# Patient Record
Sex: MALE | Race: WHITE | NOT HISPANIC OR LATINO | Employment: UNEMPLOYED | URBAN - METROPOLITAN AREA
[De-identification: names, ages, dates, MRNs, and addresses within clinical notes are randomized per-mention and may not be internally consistent; named-entity substitution may affect disease eponyms.]

---

## 2017-12-20 ENCOUNTER — HOSPITAL ENCOUNTER (INPATIENT)
Facility: MEDICAL CENTER | Age: 47
LOS: 5 days | DRG: 482 | End: 2017-12-25
Attending: EMERGENCY MEDICINE | Admitting: FAMILY MEDICINE
Payer: MEDICAID

## 2017-12-20 ENCOUNTER — APPOINTMENT (OUTPATIENT)
Dept: RADIOLOGY | Facility: MEDICAL CENTER | Age: 47
DRG: 482 | End: 2017-12-20
Attending: EMERGENCY MEDICINE
Payer: MEDICAID

## 2017-12-20 DIAGNOSIS — S72.002A CLOSED FRACTURE OF LEFT HIP, INITIAL ENCOUNTER (HCC): Primary | ICD-10-CM

## 2017-12-20 PROBLEM — S72.009A HIP FRACTURE (HCC): Status: ACTIVE | Noted: 2017-12-20

## 2017-12-20 LAB
ALBUMIN SERPL BCP-MCNC: 3.8 G/DL (ref 3.2–4.9)
ALBUMIN/GLOB SERPL: 1.3 G/DL
ALP SERPL-CCNC: 66 U/L (ref 30–99)
ALT SERPL-CCNC: 30 U/L (ref 2–50)
ANION GAP SERPL CALC-SCNC: 10 MMOL/L (ref 0–11.9)
APTT PPP: 30.7 SEC (ref 24.7–36)
AST SERPL-CCNC: 24 U/L (ref 12–45)
BASOPHILS # BLD AUTO: 0.3 % (ref 0–1.8)
BASOPHILS # BLD: 0.03 K/UL (ref 0–0.12)
BILIRUB SERPL-MCNC: 0.5 MG/DL (ref 0.1–1.5)
BUN SERPL-MCNC: 24 MG/DL (ref 8–22)
CALCIUM SERPL-MCNC: 9.2 MG/DL (ref 8.5–10.5)
CHLORIDE SERPL-SCNC: 103 MMOL/L (ref 96–112)
CO2 SERPL-SCNC: 22 MMOL/L (ref 20–33)
CREAT SERPL-MCNC: 0.68 MG/DL (ref 0.5–1.4)
EOSINOPHIL # BLD AUTO: 0.08 K/UL (ref 0–0.51)
EOSINOPHIL NFR BLD: 0.9 % (ref 0–6.9)
ERYTHROCYTE [DISTWIDTH] IN BLOOD BY AUTOMATED COUNT: 39.1 FL (ref 35.9–50)
GFR SERPL CREATININE-BSD FRML MDRD: >60 ML/MIN/1.73 M 2
GLOBULIN SER CALC-MCNC: 2.9 G/DL (ref 1.9–3.5)
GLUCOSE BLD-MCNC: 294 MG/DL (ref 65–99)
GLUCOSE SERPL-MCNC: 352 MG/DL (ref 65–99)
HCT VFR BLD AUTO: 42.3 % (ref 42–52)
HGB BLD-MCNC: 14.1 G/DL (ref 14–18)
IMM GRANULOCYTES # BLD AUTO: 0.03 K/UL (ref 0–0.11)
IMM GRANULOCYTES NFR BLD AUTO: 0.3 % (ref 0–0.9)
INR PPP: 1.09 (ref 0.87–1.13)
LYMPHOCYTES # BLD AUTO: 0.75 K/UL (ref 1–4.8)
LYMPHOCYTES NFR BLD: 8.3 % (ref 22–41)
MAGNESIUM SERPL-MCNC: 2 MG/DL (ref 1.5–2.5)
MCH RBC QN AUTO: 28.1 PG (ref 27–33)
MCHC RBC AUTO-ENTMCNC: 33.3 G/DL (ref 33.7–35.3)
MCV RBC AUTO: 84.4 FL (ref 81.4–97.8)
MONOCYTES # BLD AUTO: 0.57 K/UL (ref 0–0.85)
MONOCYTES NFR BLD AUTO: 6.3 % (ref 0–13.4)
NEUTROPHILS # BLD AUTO: 7.63 K/UL (ref 1.82–7.42)
NEUTROPHILS NFR BLD: 83.9 % (ref 44–72)
NRBC # BLD AUTO: 0 K/UL
NRBC BLD-RTO: 0 /100 WBC
PLATELET # BLD AUTO: 210 K/UL (ref 164–446)
PMV BLD AUTO: 11.1 FL (ref 9–12.9)
POTASSIUM SERPL-SCNC: 3.9 MMOL/L (ref 3.6–5.5)
PROT SERPL-MCNC: 6.7 G/DL (ref 6–8.2)
PROTHROMBIN TIME: 13.8 SEC (ref 12–14.6)
RBC # BLD AUTO: 5.01 M/UL (ref 4.7–6.1)
SODIUM SERPL-SCNC: 135 MMOL/L (ref 135–145)
TROPONIN I SERPL-MCNC: <0.01 NG/ML (ref 0–0.04)
WBC # BLD AUTO: 9.1 K/UL (ref 4.8–10.8)

## 2017-12-20 PROCEDURE — 83970 ASSAY OF PARATHORMONE: CPT

## 2017-12-20 PROCEDURE — 770006 HCHG ROOM/CARE - MED/SURG/GYN SEMI*

## 2017-12-20 PROCEDURE — 83036 HEMOGLOBIN GLYCOSYLATED A1C: CPT

## 2017-12-20 PROCEDURE — 700102 HCHG RX REV CODE 250 W/ 637 OVERRIDE(OP): Performed by: FAMILY MEDICINE

## 2017-12-20 PROCEDURE — 72170 X-RAY EXAM OF PELVIS: CPT

## 2017-12-20 PROCEDURE — 73552 X-RAY EXAM OF FEMUR 2/>: CPT | Mod: LT

## 2017-12-20 PROCEDURE — A9270 NON-COVERED ITEM OR SERVICE: HCPCS | Performed by: FAMILY MEDICINE

## 2017-12-20 PROCEDURE — 83735 ASSAY OF MAGNESIUM: CPT

## 2017-12-20 PROCEDURE — 96372 THER/PROPH/DIAG INJ SC/IM: CPT

## 2017-12-20 PROCEDURE — 84443 ASSAY THYROID STIM HORMONE: CPT

## 2017-12-20 PROCEDURE — 85610 PROTHROMBIN TIME: CPT

## 2017-12-20 PROCEDURE — 85730 THROMBOPLASTIN TIME PARTIAL: CPT

## 2017-12-20 PROCEDURE — 99285 EMERGENCY DEPT VISIT HI MDM: CPT

## 2017-12-20 PROCEDURE — 80053 COMPREHEN METABOLIC PANEL: CPT

## 2017-12-20 PROCEDURE — 96374 THER/PROPH/DIAG INJ IV PUSH: CPT

## 2017-12-20 PROCEDURE — 71010 DX-CHEST-PORTABLE (1 VIEW): CPT

## 2017-12-20 PROCEDURE — 85025 COMPLETE CBC W/AUTO DIFF WBC: CPT

## 2017-12-20 PROCEDURE — 82962 GLUCOSE BLOOD TEST: CPT

## 2017-12-20 PROCEDURE — 84484 ASSAY OF TROPONIN QUANT: CPT

## 2017-12-20 PROCEDURE — 700111 HCHG RX REV CODE 636 W/ 250 OVERRIDE (IP): Performed by: EMERGENCY MEDICINE

## 2017-12-20 PROCEDURE — 94760 N-INVAS EAR/PLS OXIMETRY 1: CPT

## 2017-12-20 RX ORDER — IBUPROFEN 600 MG/1
600 TABLET ORAL EVERY 6 HOURS PRN
Status: DISCONTINUED | OUTPATIENT
Start: 2017-12-20 | End: 2017-12-25 | Stop reason: HOSPADM

## 2017-12-20 RX ORDER — ACETAMINOPHEN 325 MG/1
650 TABLET ORAL EVERY 6 HOURS PRN
Status: DISCONTINUED | OUTPATIENT
Start: 2017-12-20 | End: 2017-12-25 | Stop reason: HOSPADM

## 2017-12-20 RX ORDER — POLYETHYLENE GLYCOL 3350 17 G/17G
1 POWDER, FOR SOLUTION ORAL
Status: DISCONTINUED | OUTPATIENT
Start: 2017-12-20 | End: 2017-12-25 | Stop reason: HOSPADM

## 2017-12-20 RX ORDER — BISACODYL 10 MG
10 SUPPOSITORY, RECTAL RECTAL
Status: DISCONTINUED | OUTPATIENT
Start: 2017-12-20 | End: 2017-12-25 | Stop reason: HOSPADM

## 2017-12-20 RX ORDER — GABAPENTIN 300 MG/1
300 CAPSULE ORAL EVERY EVENING
COMMUNITY

## 2017-12-20 RX ORDER — HYDROMORPHONE HYDROCHLORIDE 2 MG/ML
1 INJECTION, SOLUTION INTRAMUSCULAR; INTRAVENOUS; SUBCUTANEOUS ONCE
Status: COMPLETED | OUTPATIENT
Start: 2017-12-20 | End: 2017-12-20

## 2017-12-20 RX ORDER — HYDROMORPHONE HYDROCHLORIDE 2 MG/ML
1 INJECTION, SOLUTION INTRAMUSCULAR; INTRAVENOUS; SUBCUTANEOUS
Status: DISCONTINUED | OUTPATIENT
Start: 2017-12-20 | End: 2017-12-21

## 2017-12-20 RX ORDER — GABAPENTIN 300 MG/1
300 CAPSULE ORAL EVERY EVENING
Status: DISCONTINUED | OUTPATIENT
Start: 2017-12-20 | End: 2017-12-25 | Stop reason: HOSPADM

## 2017-12-20 RX ORDER — DEXTROSE MONOHYDRATE 25 G/50ML
25 INJECTION, SOLUTION INTRAVENOUS
Status: DISCONTINUED | OUTPATIENT
Start: 2017-12-20 | End: 2017-12-25 | Stop reason: HOSPADM

## 2017-12-20 RX ORDER — INSULIN GLARGINE 100 [IU]/ML
36 INJECTION, SOLUTION SUBCUTANEOUS EVERY EVENING
Status: DISCONTINUED | OUTPATIENT
Start: 2017-12-20 | End: 2017-12-20

## 2017-12-20 RX ORDER — ONDANSETRON 4 MG/1
4 TABLET, ORALLY DISINTEGRATING ORAL EVERY 4 HOURS PRN
Status: DISCONTINUED | OUTPATIENT
Start: 2017-12-20 | End: 2017-12-25 | Stop reason: HOSPADM

## 2017-12-20 RX ORDER — CYCLOBENZAPRINE HCL 10 MG
10 TABLET ORAL EVERY EVENING
COMMUNITY

## 2017-12-20 RX ORDER — INSULIN GLARGINE 100 [IU]/ML
29 INJECTION, SOLUTION SUBCUTANEOUS EVERY EVENING
Status: DISCONTINUED | OUTPATIENT
Start: 2017-12-20 | End: 2017-12-21

## 2017-12-20 RX ORDER — SODIUM CHLORIDE AND POTASSIUM CHLORIDE 150; 450 MG/100ML; MG/100ML
INJECTION, SOLUTION INTRAVENOUS CONTINUOUS
Status: DISCONTINUED | OUTPATIENT
Start: 2017-12-20 | End: 2017-12-22

## 2017-12-20 RX ORDER — ATORVASTATIN CALCIUM 10 MG/1
10 TABLET, FILM COATED ORAL NIGHTLY
Status: DISCONTINUED | OUTPATIENT
Start: 2017-12-20 | End: 2017-12-25 | Stop reason: HOSPADM

## 2017-12-20 RX ORDER — AMOXICILLIN 250 MG
2 CAPSULE ORAL 2 TIMES DAILY
Status: DISCONTINUED | OUTPATIENT
Start: 2017-12-20 | End: 2017-12-25 | Stop reason: HOSPADM

## 2017-12-20 RX ORDER — ATORVASTATIN CALCIUM 10 MG/1
10 TABLET, FILM COATED ORAL NIGHTLY
COMMUNITY

## 2017-12-20 RX ADMIN — STANDARDIZED SENNA CONCENTRATE AND DOCUSATE SODIUM 2 TABLET: 8.6; 5 TABLET, FILM COATED ORAL at 23:29

## 2017-12-20 RX ADMIN — ATORVASTATIN CALCIUM 10 MG: 10 TABLET, FILM COATED ORAL at 23:29

## 2017-12-20 RX ADMIN — GABAPENTIN 300 MG: 300 CAPSULE ORAL at 23:29

## 2017-12-20 RX ADMIN — INSULIN GLARGINE 29 UNITS: 100 INJECTION, SOLUTION SUBCUTANEOUS at 23:40

## 2017-12-20 RX ADMIN — HYDROMORPHONE HYDROCHLORIDE 1 MG: 2 INJECTION INTRAMUSCULAR; INTRAVENOUS; SUBCUTANEOUS at 21:47

## 2017-12-21 LAB
EST. AVERAGE GLUCOSE BLD GHB EST-MCNC: 258 MG/DL
GLUCOSE BLD-MCNC: 156 MG/DL (ref 65–99)
GLUCOSE BLD-MCNC: 160 MG/DL (ref 65–99)
GLUCOSE BLD-MCNC: 181 MG/DL (ref 65–99)
GLUCOSE BLD-MCNC: 234 MG/DL (ref 65–99)
HBA1C MFR BLD: 10.6 % (ref 0–5.6)
PTH-INTACT SERPL-MCNC: 28.2 PG/ML (ref 14–72)
TSH SERPL DL<=0.005 MIU/L-ACNC: 2.42 UIU/ML (ref 0.38–5.33)

## 2017-12-21 PROCEDURE — 700101 HCHG RX REV CODE 250: Performed by: FAMILY MEDICINE

## 2017-12-21 PROCEDURE — A9270 NON-COVERED ITEM OR SERVICE: HCPCS | Performed by: FAMILY MEDICINE

## 2017-12-21 PROCEDURE — 700102 HCHG RX REV CODE 250 W/ 637 OVERRIDE(OP): Performed by: FAMILY MEDICINE

## 2017-12-21 PROCEDURE — 82962 GLUCOSE BLOOD TEST: CPT | Mod: 91

## 2017-12-21 PROCEDURE — 770006 HCHG ROOM/CARE - MED/SURG/GYN SEMI*

## 2017-12-21 PROCEDURE — 700111 HCHG RX REV CODE 636 W/ 250 OVERRIDE (IP): Performed by: FAMILY MEDICINE

## 2017-12-21 RX ORDER — INSULIN GLARGINE 100 [IU]/ML
32 INJECTION, SOLUTION SUBCUTANEOUS EVERY EVENING
Status: DISCONTINUED | OUTPATIENT
Start: 2017-12-21 | End: 2017-12-23

## 2017-12-21 RX ORDER — MORPHINE SULFATE 4 MG/ML
4 INJECTION, SOLUTION INTRAMUSCULAR; INTRAVENOUS
Status: DISCONTINUED | OUTPATIENT
Start: 2017-12-21 | End: 2017-12-25

## 2017-12-21 RX ORDER — HYDROCODONE BITARTRATE AND ACETAMINOPHEN 5; 325 MG/1; MG/1
1-2 TABLET ORAL EVERY 4 HOURS PRN
Status: DISCONTINUED | OUTPATIENT
Start: 2017-12-21 | End: 2017-12-25 | Stop reason: HOSPADM

## 2017-12-21 RX ADMIN — ATORVASTATIN CALCIUM 10 MG: 10 TABLET, FILM COATED ORAL at 20:04

## 2017-12-21 RX ADMIN — HYDROCODONE BITARTRATE AND ACETAMINOPHEN 2 TABLET: 5; 325 TABLET ORAL at 16:49

## 2017-12-21 RX ADMIN — POTASSIUM CHLORIDE AND SODIUM CHLORIDE: 450; 150 INJECTION, SOLUTION INTRAVENOUS at 10:39

## 2017-12-21 RX ADMIN — HYDROMORPHONE HYDROCHLORIDE 1 MG: 2 INJECTION INTRAMUSCULAR; INTRAVENOUS; SUBCUTANEOUS at 01:40

## 2017-12-21 RX ADMIN — INSULIN GLARGINE 32 UNITS: 100 INJECTION, SOLUTION SUBCUTANEOUS at 20:10

## 2017-12-21 RX ADMIN — ENOXAPARIN SODIUM 40 MG: 100 INJECTION SUBCUTANEOUS at 16:50

## 2017-12-21 RX ADMIN — HYDROCODONE BITARTRATE AND ACETAMINOPHEN 2 TABLET: 5; 325 TABLET ORAL at 21:44

## 2017-12-21 RX ADMIN — MORPHINE SULFATE 4 MG: 4 INJECTION INTRAVENOUS at 19:57

## 2017-12-21 RX ADMIN — SERTRALINE 50 MG: 50 TABLET, FILM COATED ORAL at 10:40

## 2017-12-21 RX ADMIN — GABAPENTIN 300 MG: 300 CAPSULE ORAL at 20:04

## 2017-12-21 RX ADMIN — HYDROMORPHONE HYDROCHLORIDE 1 MG: 2 INJECTION INTRAMUSCULAR; INTRAVENOUS; SUBCUTANEOUS at 05:32

## 2017-12-21 RX ADMIN — IBUPROFEN 600 MG: 600 TABLET, FILM COATED ORAL at 02:05

## 2017-12-21 RX ADMIN — MORPHINE SULFATE 4 MG: 4 INJECTION INTRAVENOUS at 10:39

## 2017-12-21 RX ADMIN — POTASSIUM CHLORIDE AND SODIUM CHLORIDE 1 ML: 450; 150 INJECTION, SOLUTION INTRAVENOUS at 02:05

## 2017-12-21 RX ADMIN — POTASSIUM CHLORIDE AND SODIUM CHLORIDE: 450; 150 INJECTION, SOLUTION INTRAVENOUS at 20:03

## 2017-12-21 RX ADMIN — IBUPROFEN 600 MG: 600 TABLET, FILM COATED ORAL at 10:40

## 2017-12-21 ASSESSMENT — LIFESTYLE VARIABLES
TOTAL SCORE: 3
DOES PATIENT WANT TO STOP DRINKING: NO
ALCOHOL_USE: YES
EVER_SMOKED: YES
HEADACHE, FULLNESS IN HEAD: NOT PRESENT
TOTAL SCORE: 3
EVER FELT BAD OR GUILTY ABOUT YOUR DRINKING: YES
EVER HAD A DRINK FIRST THING IN THE MORNING TO STEADY YOUR NERVES TO GET RID OF A HANGOVER: YES
AUDITORY DISTURBANCES: NOT PRESENT
VISUAL DISTURBANCES: NOT PRESENT
TOTAL SCORE: 3
TOTAL SCORE: 0
TREMOR: NO TREMOR
HAVE PEOPLE ANNOYED YOU BY CRITICIZING YOUR DRINKING: NO
ORIENTATION AND CLOUDING OF SENSORIUM: ORIENTED AND CAN DO SERIAL ADDITIONS
CONSUMPTION TOTAL: INCOMPLETE
HOW MANY TIMES IN THE PAST YEAR HAVE YOU HAD 5 OR MORE DRINKS IN A DAY: 20
PAROXYSMAL SWEATS: NO SWEAT VISIBLE
AGITATION: NORMAL ACTIVITY
ANXIETY: NO ANXIETY (AT EASE)
NAUSEA AND VOMITING: NO NAUSEA AND NO VOMITING
HAVE YOU EVER FELT YOU SHOULD CUT DOWN ON YOUR DRINKING: YES

## 2017-12-21 ASSESSMENT — PATIENT HEALTH QUESTIONNAIRE - PHQ9
3. TROUBLE FALLING OR STAYING ASLEEP OR SLEEPING TOO MUCH: NOT AT ALL
7. TROUBLE CONCENTRATING ON THINGS, SUCH AS READING THE NEWSPAPER OR WATCHING TELEVISION: NOT AT ALL
SUM OF ALL RESPONSES TO PHQ9 QUESTIONS 1 AND 2: 3
8. MOVING OR SPEAKING SO SLOWLY THAT OTHER PEOPLE COULD HAVE NOTICED. OR THE OPPOSITE, BEING SO FIGETY OR RESTLESS THAT YOU HAVE BEEN MOVING AROUND A LOT MORE THAN USUAL: NOT AT ALL
5. POOR APPETITE OR OVEREATING: NOT AT ALL
3. TROUBLE FALLING OR STAYING ASLEEP OR SLEEPING TOO MUCH: NOT AT ALL
8. MOVING OR SPEAKING SO SLOWLY THAT OTHER PEOPLE COULD HAVE NOTICED. OR THE OPPOSITE, BEING SO FIGETY OR RESTLESS THAT YOU HAVE BEEN MOVING AROUND A LOT MORE THAN USUAL: NOT AT ALL
7. TROUBLE CONCENTRATING ON THINGS, SUCH AS READING THE NEWSPAPER OR WATCHING TELEVISION: NOT AT ALL
6. FEELING BAD ABOUT YOURSELF - OR THAT YOU ARE A FAILURE OR HAVE LET YOURSELF OR YOUR FAMILY DOWN: NOT AL ALL
6. FEELING BAD ABOUT YOURSELF - OR THAT YOU ARE A FAILURE OR HAVE LET YOURSELF OR YOUR FAMILY DOWN: SEVERAL DAYS
4. FEELING TIRED OR HAVING LITTLE ENERGY: NOT AT ALL
SUM OF ALL RESPONSES TO PHQ9 QUESTIONS 1 AND 2: 0
4. FEELING TIRED OR HAVING LITTLE ENERGY: NOT AT ALL
SUM OF ALL RESPONSES TO PHQ QUESTIONS 1-9: 0
1. LITTLE INTEREST OR PLEASURE IN DOING THINGS: SEVERAL DAYS
1. LITTLE INTEREST OR PLEASURE IN DOING THINGS: NOT AT ALL
5. POOR APPETITE OR OVEREATING: NOT AT ALL
SUM OF ALL RESPONSES TO PHQ QUESTIONS 1-9: 4
2. FEELING DOWN, DEPRESSED, IRRITABLE, OR HOPELESS: NOT AT ALL
2. FEELING DOWN, DEPRESSED, IRRITABLE, OR HOPELESS: MORE THAN HALF THE DAYS

## 2017-12-21 ASSESSMENT — PAIN SCALES - GENERAL
PAINLEVEL_OUTOF10: 3
PAINLEVEL_OUTOF10: 10
PAINLEVEL_OUTOF10: 6
PAINLEVEL_OUTOF10: 10
PAINLEVEL_OUTOF10: 4
PAINLEVEL_OUTOF10: 10
PAINLEVEL_OUTOF10: 4
PAINLEVEL_OUTOF10: 3
PAINLEVEL_OUTOF10: 10

## 2017-12-21 NOTE — DISCHARGE PLANNING
Pt admitted for MGLF and left hip fracture.  Pt may have surgery on Friday or Saturday per Dr. King.  Will wait for recommendations from PT/OT.

## 2017-12-21 NOTE — CARE PLAN
Problem: Safety  Goal: Will remain free from injury    Intervention: Provide assistance with mobility  NWB status , gentle repositioning required for patient comfort

## 2017-12-21 NOTE — PROGRESS NOTES
Assumed care at 0120    Patient screaming in pain . Refusing to transfer from [facility] to bed   Refusing to turn for skin check, was able to assess very carefully after log rolling him to the right.    Provided pain medication   Repositioned and gave blanket and reassuring words to comfort patient     Patient NPO since midnight   Other than a minor sip of water for medication     Patient VSS    Declined feeling any withdraw symptoms from earlier meth use     Patient incoherent for attempt to complete admission profile and refused to cooperate , was very somnolent and did not respond to questions, however AXO 4   Will attempt again before shift end to complete profile

## 2017-12-21 NOTE — ED NOTES
"Pt BIB RFD for GLF at 1700 today w/ possible L sided hip fracture. Pt given total of 300 fentanyl and 5 versed; very drowsy as result when attempting to assess. RPD stated pt has hx of DM and neuropathy w/ FSBG in 100's. Pt oxygen saturation dropped to 78% upon arrival to room and was placed on 5L o2 via NC w/ oxygen improved to 96%. NAD observed at this time.     Chief Complaint   Patient presents with   • GLF     at 1700 today   • Hip Injury     L hip     ./72   Pulse 96   Temp 36.5 °C (97.7 °F)   Resp 14   Ht 1.854 m (6' 1\")   Wt 83.9 kg (185 lb)   SpO2 96%   BMI 24.41 kg/m²     "

## 2017-12-21 NOTE — ED PROVIDER NOTES
"ED Provider Note    CHIEF COMPLAINT  Chief Complaint   Patient presents with   • GLF     at 1700 today   • Hip Injury     L hip       HPI  Colton Prasad Jr. is a 47 y.o. male who presents for evaluation of slip on the ice and ground-level fall onto the left hip. He denies injury to the head and neck or abdomen. He has a history of diabetes. Paramedics picked him up and gave him significant amounts of pain medicine and he was quite somnolent on arrival. There was some shortening and internal rotation on the left lower extremity. No numbness weakness or tingling    REVIEW OF SYSTEMS  See HPI for further details. No high fevers chills night sweats weight loss numbness weakness or tingling All other systems are negative.     PAST MEDICAL HISTORY  Past Medical History:   Diagnosis Date   • Diabetes    • IVDA (Intravenous Drug Abus]        FAMILY HISTORY  No history of bleeding disorder     SOCIAL HISTORY  Social History     Social History   • Marital status: Single     Spouse name: N/A   • Number of children: N/A   • Years of education: N/A     Social History Main Topics   • Smoking status: Former Smoker   • Smokeless tobacco: Current User     Types: Chew      Comment: quit >20 years ago. 14 pack years hx   • Alcohol use No      Comment: occassional   • Drug use:      Types: Marijuana, Methamphetamines      Comment: \"occassional\"   • Sexual activity: Not on file     Other Topics Concern   • Not on file     Social History Narrative   • No narrative on file     Former IV drug use  SURGICAL HISTORY  No past surgical history on file.  Pilonidal cyst  CURRENT MEDICATIONS    Current Facility-Administered Medications:   •  HYDROmorphone (DILAUDID) injection 1 mg, 1 mg, Intravenous, Once, Fede Verdin M.D.    Current Outpatient Prescriptions:   •  methocarbamol (ROBAXIN) 500 MG TABS, Take 1 Tab by mouth 3 times a day as needed., Disp: 45 Tab, Rfl: 0  •  naproxen (NAPROSYN) 500 MG TABS, Take 1 Tab by mouth 2 times " "daily with meals as needed., Disp: 60 Tab, Rfl: 0  •  INSULIN SYRINGE .5CC/29G 29G X 1/2\" 0.5 ML MISC, Use as directed, Disp: 150 Each, Rfl: 0  •  insulin glargine (LANTUS) 100 UNIT/ML SOLN, Inject 20 Units as instructed 2 times a day., Disp: 10 mL, Rfl: 3  •  pregabalin (LYRICA) 100 MG CAPS, 2 caps po tid diabetic neuropathy, Disp: 180 Cap, Rfl: 0  •  insulin regular (HUMULIN R) 100 Unit/mL SOLN, 10 units ac tid breakfast, lunch and dinner. Hold if pre-prandial blood sugar less than 150 mg/dl., Disp: 1 Vial, Rfl: 3  •  sertraline (ZOLOFT) 50 MG TABS, Take 1 Tab by mouth every day., Disp: 30 Tab, Rfl: 3  •  Glucagon, rDNA, 1 MG KIT, 1 Kit by Injection route as needed., Disp: 1 Kit, Rfl: 1  •  glucose blood (PRECISION XTRA TEST STRIPS) strip, Use as directed, Disp: 100 Strip, Rfl: 3  •  losartan (COZAAR) 50 MG TABS, Take 1 Tab by mouth every day., Disp: 30 Tab, Rfl: 3  •  aspirin EC (ECOTRIN) 81 MG TBEC, Take 1 Tab by mouth every day., Disp: 30 Tab, Rfl: 3  •  insulin aspart (NOVOLOG) 100 UNIT/ML SOLN, 10 units ac tid breakfast, lunch and dinner. Hold if pre-prandial blood sugar less than 150 mg/dl., Disp: 2 Vial, Rfl: 3  •  Blood Glucose Monitoring Suppl (PRECISION XTRA MONITOR) BENITO, Test at least 3 times per day., Disp: 1 Each, Rfl: 3  •  TechLite AST Lancets MISC, Use as directed, Disp: 100 Each, Rfl: 3      ALLERGIES  Allergies   Allergen Reactions   • Nkda [No Known Drug Allergy]        PHYSICAL EXAM  VITAL SIGNS: /72   Pulse 96   Temp 36.5 °C (97.7 °F)   Resp 14   Ht 1.854 m (6' 1\")   Wt 83.9 kg (185 lb)   SpO2 96%   BMI 24.41 kg/m²       Constitutional:Somnolent but arousable.   HENT: Normocephalic, Atraumatic, Bilateral external ears normal, Oropharynx moist, No oral exudates, Nose normal.   Eyes: PERRLA, EOMI, Conjunctiva normal, No discharge.   Neck: Normal range of motion, No tenderness, Supple, No stridor.   Cardiovascular: Normal heart rate, Normal rhythm, No murmurs, No rubs, No gallops. "   Thorax & Lungs: Normal breath sounds, No respiratory distress, No wheezing, No chest tenderness.   Abdomen: Bowel sounds normal, Soft, No tenderness, No masses, No pulsatile masses.   Skin: Warm, Dry, No erythema, No rash.   Back: No tenderness, No CVA tenderness.   Extremities: Exquisite pain with any manipulation of the left hip. There is some subtle shortening and internal rotation distal neurovascular exam including sensation on the foot dorsalis pedal pulses normal   Neurologic: Alert & oriented x 3, Normal motor function, Normal sensory function, No focal deficits noted.   Psychiatric: Affect normal, Judgment normal, Mood normal.         Results for orders placed or performed during the hospital encounter of 12/20/17   CBC WITH DIFFERENTIAL   Result Value Ref Range    WBC 9.1 4.8 - 10.8 K/uL    RBC 5.01 4.70 - 6.10 M/uL    Hemoglobin 14.1 14.0 - 18.0 g/dL    Hematocrit 42.3 42.0 - 52.0 %    MCV 84.4 81.4 - 97.8 fL    MCH 28.1 27.0 - 33.0 pg    MCHC 33.3 (L) 33.7 - 35.3 g/dL    RDW 39.1 35.9 - 50.0 fL    Platelet Count 210 164 - 446 K/uL    MPV 11.1 9.0 - 12.9 fL    Neutrophils-Polys 83.90 (H) 44.00 - 72.00 %    Lymphocytes 8.30 (L) 22.00 - 41.00 %    Monocytes 6.30 0.00 - 13.40 %    Eosinophils 0.90 0.00 - 6.90 %    Basophils 0.30 0.00 - 1.80 %    Immature Granulocytes 0.30 0.00 - 0.90 %    Nucleated RBC 0.00 /100 WBC    Neutrophils (Absolute) 7.63 (H) 1.82 - 7.42 K/uL    Lymphs (Absolute) 0.75 (L) 1.00 - 4.80 K/uL    Monos (Absolute) 0.57 0.00 - 0.85 K/uL    Eos (Absolute) 0.08 0.00 - 0.51 K/uL    Baso (Absolute) 0.03 0.00 - 0.12 K/uL    Immature Granulocytes (abs) 0.03 0.00 - 0.11 K/uL    NRBC (Absolute) 0.00 K/uL   COMP METABOLIC PANEL   Result Value Ref Range    Sodium 135 135 - 145 mmol/L    Potassium 3.9 3.6 - 5.5 mmol/L    Chloride 103 96 - 112 mmol/L    Co2 22 20 - 33 mmol/L    Anion Gap 10.0 0.0 - 11.9    Glucose 352 (H) 65 - 99 mg/dL    Bun 24 (H) 8 - 22 mg/dL    Creatinine 0.68 0.50 - 1.40 mg/dL     Calcium 9.2 8.5 - 10.5 mg/dL    AST(SGOT) 24 12 - 45 U/L    ALT(SGPT) 30 2 - 50 U/L    Alkaline Phosphatase 66 30 - 99 U/L    Total Bilirubin 0.5 0.1 - 1.5 mg/dL    Albumin 3.8 3.2 - 4.9 g/dL    Total Protein 6.7 6.0 - 8.2 g/dL    Globulin 2.9 1.9 - 3.5 g/dL    A-G Ratio 1.3 g/dL   TROPONIN   Result Value Ref Range    Troponin I <0.01 0.00 - 0.04 ng/mL   ESTIMATED GFR   Result Value Ref Range    GFR If African American >60 >60 mL/min/1.73 m 2    GFR If Non African American >60 >60 mL/min/1.73 m 2   ACCU-CHEK GLUCOSE   Result Value Ref Range    Glucose - Accu-Ck 294 (H) 65 - 99 mg/dL      RADIOLOGY/PROCEDURES  DX-CHEST-PORTABLE (1 VIEW)   Final Result      Opacity projecting over the right lung apex and right supraclavicular region is likely external to the patient.      DX-PELVIS-1 OR 2 VIEWS   Final Result      Comminuted intertrochanteric fracture of the left femur.      DX-FEMUR-2+ LEFT   Final Result      Intertrochanteric left femur fracture.            COURSE & MEDICAL DECISION MAKING  Pertinent Labs & Imaging studies reviewed. (See chart for details)  Patient initially did not require any pain medication because the paramedics had given him such aggressive doses. Subsequent radiographs demonstrate a left-sided intertrochanteric hip fracture. He is neurovascularly intact. Consultation with the Dr. Yee was obtained. The patient will be admitted to [facility] with orthopedic consultation. He is given IV pain medication here.    FINAL IMPRESSION  1. Acute traumatic left sided intertrochanteric hip fracture  2. Hyperglycemia without acidosis         Electronically signed by: Fede Verdin, 12/20/2017 8:29 PM

## 2017-12-21 NOTE — CARE PLAN
Problem: Communication  Goal: The ability to communicate needs accurately and effectively will improve    Intervention: [place] patient and significant other/support system to call light to alert staff of needs  Patient was not verbally communicating effectively related to intense pain and discomfort. Encouraged the use of the call light and oriented patient to surroundings

## 2017-12-21 NOTE — CARE PLAN
Problem: Nutritional:  Goal: Patient to verbalize or demonstrate understanding of diet  Outcome: MET Date Met: 12/21/17  Provided pt with diabetic diet education. Pt receptive of information.

## 2017-12-21 NOTE — ED NOTES
"Med rec complete per pt at bedside  Allergies reviewed - NKDA  No ABX in last month  Pt has not taken any of his home medications today, states \"only marijuana and meth today\"  "

## 2017-12-21 NOTE — H&P
"Adult History & Physical Exam       HISTORY OF PRESENT ILLNESS:     Chief Complaint: Left hip pain s/p MGLF    History of Present Illness: Colton  is a 47 y.o. Male with a PMH of DM type 1, IVDA (meth  who was admitted on 12/20/2017 for left hip pain after slipping on ice while carrying packages at ~5pm.  First fell and hit his left hip and then left elbow.  Denies hitting his head, denies LOC. Denies elbow pain.  Denies any lightheadedness, dizziness, or LOC before fall.    Also has a history of DM type 1 with non-compliance with DM medication.  Sees Suzette Joe for DM care, but admits he does not use his short acting insulin as instructed.    Active IVDA - methamphetamines      PAST MEDICAL HISTORY:     Primary Care Physician:  Edmar Jolley MD ([facility])    Past Medical History:    1. Diabetes Mellitus type 1,   2. Tobacco dependence (chewing x 25years),   3. Intravenous Drug Abuse (IV Methamphetamines),   4. THC Abuse,   5. Alcoholism,   6. Peripheral Neuropathy,   7. Depression.    Past Surgical History:    1. Cataract surgery,   2. retinal laser surgery,   3. Pilonidal Cyst removal    Allergies:  NKDA    Home Medications:    1. Toujeo 36Units qhs  2. Humalog 1Units for every 100mg/dL greater than 150mg/dL postprandial  3. Atorvastatin 10mg qhs  4. Gabapentin 300mg qhs  5. Sertraline 50mg q day    Social History:    Chewing tobacco x 25 years  \"2 quarts of beer 2-3x/week\"  HX of DUI x 3  THC use (active)  IVDA - Methamphetamines (last used last night)    Family History:  Noncontributory    Review of Systems: I have reviewed at least 10 organs systems and found them to be negative except as described above.     OBJECTIVE:     Vitals:   Blood pressure 120/72, pulse 96, temperature 36.5 °C (97.7 °F), resp. rate 14, height 1.854 m (6' 1\"), weight 83.9 kg (185 lb), SpO2 96 %.    Physical Exam:  Gen:  NAD, disheveled appearance  HEENT: MMM, EOMI, PERRL  Cardio: RRR, clear s1/s2, no murmur  Resp:  Equal " bilat, clear to auscultation  GI/: Soft, non-distended, no TTP, normal bowel sounds, no guarding/rebound  Neuro: Alert and Oriented x3, CN II-XII grossly intact. Sensory in distal BLE diminished (baseline 2/2 neuropathy)  Skin/Extremities: Cap refill <3sec, warm/well perfused, no rash, Left leg externally rotated and too tender to adequately exam. Antecubital fossa injection wounds BUE      Labs:   Lab Results   Component Value Date/Time    SODIUM 135 12/20/2017 08:01 PM    POTASSIUM 3.9 12/20/2017 08:01 PM    CHLORIDE 103 12/20/2017 08:01 PM    CO2 22 12/20/2017 08:01 PM    GLUCOSE 352 (H) 12/20/2017 08:01 PM    BUN 24 (H) 12/20/2017 08:01 PM    CREATININE 0.68 12/20/2017 08:01 PM    CREATININE 0.9 02/25/2009 02:41 PM      Lab Results   Component Value Date/Time    WBC 9.1 12/20/2017 08:45 PM    RBC 5.01 12/20/2017 08:45 PM    HEMOGLOBIN 14.1 12/20/2017 08:45 PM    HEMATOCRIT 42.3 12/20/2017 08:45 PM    MCV 84.4 12/20/2017 08:45 PM    MCH 28.1 12/20/2017 08:45 PM    MCHC 33.3 (L) 12/20/2017 08:45 PM    MPV 11.1 12/20/2017 08:45 PM    NEUTSPOLYS 83.90 (H) 12/20/2017 08:45 PM    LYMPHOCYTES 8.30 (L) 12/20/2017 08:45 PM    MONOCYTES 6.30 12/20/2017 08:45 PM    EOSINOPHILS 0.90 12/20/2017 08:45 PM    BASOPHILS 0.30 12/20/2017 08:45 PM    HYPOCHROMIA 1+ 08/11/2014 09:38 AM        Imaging:   Left Intertrochanteric Fracture    ASSESSMENT/PLAN:   47 y.o. male with Left intertrochanteric femur fracture and DM type 1.    # Left intertrochanteric femur fracture   - Dr. Yee (Ortho) contacted by EDMD Dr. Young and will see in AM   - Ortho rec's (per EDMD) no traction or adjunct measures necessary at this time.  - Dilaudid 1mg q2 hours Prn pain  - APAP and Ibuprofen prn pain.  - SCD VTE PPX - on right only, currently holding LMWH ppx for possible upcoming surgery  - CBC in AM  - NPO at midnight; 1/2NS +20mEq KCl at 125mL/hr maintenance IVF    # Diabetes Mellitus Type 1  - hyperglycemia present  - CMP in AM; Q6 FSBS  -  Continue home Toujeo, as above (needs tonight's dose)  - Insulin Sliding Scale - low intensity  - Hypoglycemia protocol  - A1c pending    # Hx of Alcoholism  - WA protocol, no orders for Ativan at this time.  Will order as needed as pain likely to confound scoring.  -  referral    # Depression  - Continue Sertraline home dosing    # Intravenous Drug Abuse  - Patient last used yesterday  - Wants referral to resources and help quitting  -  Referral    # Full Code    Case discussed with Dr. Diaz at time of admission.

## 2017-12-21 NOTE — PROGRESS NOTES
Surgery planned for today.  Canceled for recent methamphetamine use, a contraindication to anesthesia except for emergencies.    Plan:  - NWB pending surgery  - NPO after midnight Friday for OR on Saturday  - DVT prophylaxis to being immediately with SCD's, Lovenox

## 2017-12-21 NOTE — PROGRESS NOTES
[facility] MED[facility] PROGRESS NOTE     Attending: Priya Diaz MD    Resident: Moraima Velásquez MD & Felix Siegel MD    PATIENT: Colton Prasad; [id number]; 1970    ID: 47 y.o. male admitted for MGLF and left hip fracture, with complicating T1DM, alcoholism and IV drug use.    SUBJECTIVE: No acute events overnight.  Did not receive any ativan for CIWA protocol.  Pain controlled with IV dilaudid.  Very sleepy this morning--received dilaudid 1.5 hours prior to my assessment.  Awakens to verbal commands and A&Ox4.      OBJECTIVE:     Vitals:    12/20/17 2300 12/20/17 2330 12/21/17 0200 12/21/17 0400   BP:   120/75 110/66   Pulse: 96 97 94 85   Resp:   16 16   Temp:   36.4 °C (97.5 °F) 36.1 °C (97 °F)   SpO2: 93% 99% 95% 97%   Weight:       Height:         No intake or output data in the 24 hours ending 12/21/17 0641    PE:  General: No acute distress, afebrile, resting comfortably  HEENT: NC/AT. EOMI. MMM, neck supple without adenopathy or mass  Cardiovascular: RRR, NMGR, cap refill brisk.  Respiratory: CTAB, no tachypnea or retractions  Abdomen: soft, NT/ND, no masses  EXT:  Left lower extremity with external rotation. Exam limited due to pain.  Pulses present in bilateral LE.  Small abrasions noted on bilateral shins.    Neuro: Non-focal    LABS:  Recent Labs      12/20/17 2045   WBC  9.1   RBC  5.01   HEMOGLOBIN  14.1   HEMATOCRIT  42.3   MCV  84.4   MCH  28.1   RDW  39.1   PLATELETCT  210   MPV  11.1   NEUTSPOLYS  83.90*   LYMPHOCYTES  8.30*   MONOCYTES  6.30   EOSINOPHILS  0.90   BASOPHILS  0.30     Recent Labs      12/20/17 2001   SODIUM  135   POTASSIUM  3.9   CHLORIDE  103   CO2  22   BUN  24*   CREATININE  0.68   CALCIUM  9.2   MAGNESIUM  2.0   ALBUMIN  3.8     Estimated GFR/CRCL = Estimated Creatinine Clearance: 151.8 mL/min (by C-G formula based on SCr of 0.68 mg/dL).  Recent Labs      12/20/17 2001 12/20/17 2059   GLUCOSE  352*   --    POCGLUCOSE   --   294*     Recent Labs      12/20/17 2001    ASTSGOT  24   ALTSGPT  30   TBILIRUBIN  0.5   ALKPHOSPHAT  66   GLOBULIN  2.9   INR  1.09     Recent Labs      12/20/17 2001   TROPONINI  <0.01           Invalid input(s): [id number]  Recent Labs      12/20/17 2001   INR  1.09   APTT  30.7         MEDS:  Current Facility-Administered Medications   Medication Last Dose   • senna-docusate (PERICOLACE or SENOKOT S) 8.6-50 MG per tablet 2 Tab 2 Tab at 12/20/17 2329    And   • polyethylene glycol/lytes (MIRALAX) PACKET 1 Packet      And   • magnesium hydroxide (MILK OF MAGNESIA) suspension 30 mL      And   • bisacodyl (DULCOLAX) suppository 10 mg     • ondansetron (ZOFRAN ODT) dispertab 4 mg     • acetaminophen (TYLENOL) tablet 650 mg     • insulin regular (HUMULIN R) injection 1-6 Units      And   • glucose 4 g chewable tablet 16 g      And   • dextrose 50% (D50W) injection 25 mL     • 0.45% NaCl with KCl 20 mEq infusion 1 mL at 12/21/17 0205   • HYDROmorphone (DILAUDID) injection 1 mg 1 mg at 12/21/17 0532   • atorvastatin (LIPITOR) tablet 10 mg 10 mg at 12/20/17 2329   • gabapentin (NEURONTIN) capsule 300 mg 300 mg at 12/20/17 2329   • sertraline (ZOLOFT) tablet 50 mg     • ibuprofen (MOTRIN) tablet 600 mg 600 mg at 12/21/17 0205   • insulin glargine (LANTUS) injection 29 Units 29 Units at 12/20/17 2340       ASSESSMENT/PLAN: 47 y.o. male admitted for MGLF and left hip fracture, with complicating T1DM, alcoholism and IV drug use.     # Left intertrochanteric femur fracture   - OR cancelled today due to recent IV meth use  - Dilaudid changed to morphine/norco as patient very somnolent   - APAP and Ibuprofen prn pain.  - SCD VTE PPX - on right only, currently holding LMWH ppx for possible upcoming surgery  - CBC in AM  - Regular diet ordered  - Needs to be NPO at midnight on Friday for OR on Saturday  - PT/OT post op     # Diabetes Mellitus Type 1  - Hyperglycemia present on admission  - Lantus while inpatient as sub for Toujeo [facility]  formulary--increased to 32 units today  - Insulin Sliding Scale - low intensity  - Hypoglycemia protocol  - A1c pending     # Hx of Alcoholism  - CIWA protocol--no need for ativan at this point  -  referral     # Depression  - Continue Sertraline home dosing     # Intravenous Drug Abuse  - Patient last used 1 day prior to admission  - Wants referral to resources and help quitting  -  Referral     # Full Code

## 2017-12-21 NOTE — CONSULTS
DATE OF SERVICE:  12/20/2017    HISTORY OF PRESENT ILLNESS:  The patient is a 47-year-old gentleman who   slipped on the ice, ground level fall, complains of left hip pain.  Denies any   other injuries.  He does have a past medical history significant for   diabetes.    PAST MEDICAL HISTORY:  Includes insulin-dependent diabetes, history of IV drug   abuse.    MEDICATIONS:  Include Robaxin, insulin, Lantus, Lyrica, Humulin, Zoloft,   Cozaar, aspirin 81 mg.    ALLERGIES:  No known drug allergies.    PHYSICAL EXAMINATION:  VITAL SIGNS:  He is afebrile, vital signs are stable, satting 96% on room air.  EXTREMITIES:  Left lower extremity is slightly shortened and externally   rotated.  I did not do range of motion of left hip as he has a known left hip   intertrochanteric femur fracture.  He has good pedal pulses distally.    Sensation is intact distally to the foot.  No focal deficits appreciated.  GENERAL:  He is alert and oriented, in no apparent distress.    LABORATORY DATA:  Include a white count of 9.1, hematocrit of 42, platelets   210.  He has a glucose of 352, BUN of 24.    RADIOGRAPHS:  AP, pelvis, as well as somewhat of lateral and left femur x-rays   show a left intertrochanteric femur fracture.  The subtrochanter is mildly   displaced.    IMPRESSION:  A 47-year-old gentleman, insulin-dependent diabetic, slipped and   fell on the ice.  He has a left intertrochanteric femur fracture.  He also has   significantly elevated hyperglycemia with a glucose of 352.    PLAN:  He is being admitted to the [facility] Medicine service.  Plan   will be n.p.o. after midnight with surgical fixation tomorrow.       ____________________________________     MD NEVAEH STRAUSS / RYAN    DD:  12/20/2017 22:05:12  DT:  12/20/2017 22:37:40    D#:  [id number]  Job#:  [id number]

## 2017-12-21 NOTE — PROGRESS NOTES
"Two RN skin check complete     No areas of compromise related to pressure   Bruising bi lateral knees     Bruising bi lateral AC UE  Related to IVDA    Scabs bi lateral shins , denies IVDA these area , possible \"weed eater injury\"    Ankles clear , sacrum clear       "

## 2017-12-22 ENCOUNTER — APPOINTMENT (OUTPATIENT)
Dept: RADIOLOGY | Facility: MEDICAL CENTER | Age: 47
DRG: 482 | End: 2017-12-22
Attending: ORTHOPAEDIC SURGERY
Payer: MEDICAID

## 2017-12-22 LAB
ALBUMIN SERPL BCP-MCNC: 2.8 G/DL (ref 3.2–4.9)
ALBUMIN SERPL BCP-MCNC: 3.2 G/DL (ref 3.2–4.9)
ALBUMIN/GLOB SERPL: 1 G/DL
ALBUMIN/GLOB SERPL: 1.2 G/DL
ALP SERPL-CCNC: 50 U/L (ref 30–99)
ALP SERPL-CCNC: 52 U/L (ref 30–99)
ALT SERPL-CCNC: 18 U/L (ref 2–50)
ALT SERPL-CCNC: 21 U/L (ref 2–50)
ANION GAP SERPL CALC-SCNC: 4 MMOL/L (ref 0–11.9)
ANION GAP SERPL CALC-SCNC: 9 MMOL/L (ref 0–11.9)
AST SERPL-CCNC: 16 U/L (ref 12–45)
AST SERPL-CCNC: 16 U/L (ref 12–45)
BILIRUB SERPL-MCNC: 0.3 MG/DL (ref 0.1–1.5)
BILIRUB SERPL-MCNC: 0.4 MG/DL (ref 0.1–1.5)
BUN SERPL-MCNC: 13 MG/DL (ref 8–22)
BUN SERPL-MCNC: 17 MG/DL (ref 8–22)
CALCIUM SERPL-MCNC: 8.1 MG/DL (ref 8.5–10.5)
CALCIUM SERPL-MCNC: 8.4 MG/DL (ref 8.5–10.5)
CHLORIDE SERPL-SCNC: 105 MMOL/L (ref 96–112)
CHLORIDE SERPL-SCNC: 109 MMOL/L (ref 96–112)
CO2 SERPL-SCNC: 23 MMOL/L (ref 20–33)
CO2 SERPL-SCNC: 24 MMOL/L (ref 20–33)
CREAT SERPL-MCNC: 0.53 MG/DL (ref 0.5–1.4)
CREAT SERPL-MCNC: 0.58 MG/DL (ref 0.5–1.4)
ERYTHROCYTE [DISTWIDTH] IN BLOOD BY AUTOMATED COUNT: 38.7 FL (ref 35.9–50)
GFR SERPL CREATININE-BSD FRML MDRD: >60 ML/MIN/1.73 M 2
GFR SERPL CREATININE-BSD FRML MDRD: >60 ML/MIN/1.73 M 2
GLOBULIN SER CALC-MCNC: 2.6 G/DL (ref 1.9–3.5)
GLOBULIN SER CALC-MCNC: 2.7 G/DL (ref 1.9–3.5)
GLUCOSE BLD-MCNC: 105 MG/DL (ref 65–99)
GLUCOSE BLD-MCNC: 124 MG/DL (ref 65–99)
GLUCOSE BLD-MCNC: 59 MG/DL (ref 65–99)
GLUCOSE BLD-MCNC: 67 MG/DL (ref 65–99)
GLUCOSE BLD-MCNC: 67 MG/DL (ref 65–99)
GLUCOSE BLD-MCNC: 74 MG/DL (ref 65–99)
GLUCOSE BLD-MCNC: 95 MG/DL (ref 65–99)
GLUCOSE BLD-MCNC: 99 MG/DL (ref 65–99)
GLUCOSE SERPL-MCNC: 85 MG/DL (ref 65–99)
GLUCOSE SERPL-MCNC: 94 MG/DL (ref 65–99)
HCT VFR BLD AUTO: 35.7 % (ref 42–52)
HGB BLD-MCNC: 11.8 G/DL (ref 14–18)
MCH RBC QN AUTO: 27.4 PG (ref 27–33)
MCHC RBC AUTO-ENTMCNC: 33.1 G/DL (ref 33.7–35.3)
MCV RBC AUTO: 82.8 FL (ref 81.4–97.8)
PLATELET # BLD AUTO: 183 K/UL (ref 164–446)
PMV BLD AUTO: 11.1 FL (ref 9–12.9)
POTASSIUM SERPL-SCNC: 3.9 MMOL/L (ref 3.6–5.5)
POTASSIUM SERPL-SCNC: 4.1 MMOL/L (ref 3.6–5.5)
PROT SERPL-MCNC: 5.5 G/DL (ref 6–8.2)
PROT SERPL-MCNC: 5.8 G/DL (ref 6–8.2)
RBC # BLD AUTO: 4.31 M/UL (ref 4.7–6.1)
SODIUM SERPL-SCNC: 136 MMOL/L (ref 135–145)
SODIUM SERPL-SCNC: 138 MMOL/L (ref 135–145)
WBC # BLD AUTO: 4.4 K/UL (ref 4.8–10.8)

## 2017-12-22 PROCEDURE — 700101 HCHG RX REV CODE 250: Performed by: FAMILY MEDICINE

## 2017-12-22 PROCEDURE — 700111 HCHG RX REV CODE 636 W/ 250 OVERRIDE (IP): Performed by: FAMILY MEDICINE

## 2017-12-22 PROCEDURE — 82962 GLUCOSE BLOOD TEST: CPT

## 2017-12-22 PROCEDURE — A9270 NON-COVERED ITEM OR SERVICE: HCPCS | Performed by: FAMILY MEDICINE

## 2017-12-22 PROCEDURE — 80053 COMPREHEN METABOLIC PANEL: CPT

## 2017-12-22 PROCEDURE — 700102 HCHG RX REV CODE 250 W/ 637 OVERRIDE(OP): Performed by: FAMILY MEDICINE

## 2017-12-22 PROCEDURE — 85027 COMPLETE CBC AUTOMATED: CPT

## 2017-12-22 PROCEDURE — 36415 COLL VENOUS BLD VENIPUNCTURE: CPT

## 2017-12-22 PROCEDURE — 770006 HCHG ROOM/CARE - MED/SURG/GYN SEMI*

## 2017-12-22 RX ORDER — DEXTROSE MONOHYDRATE, SODIUM CHLORIDE, AND POTASSIUM CHLORIDE 50; 1.49; 4.5 G/1000ML; G/1000ML; G/1000ML
INJECTION, SOLUTION INTRAVENOUS CONTINUOUS
Status: DISCONTINUED | OUTPATIENT
Start: 2017-12-22 | End: 2017-12-23 | Stop reason: HOSPADM

## 2017-12-22 RX ADMIN — Medication 16 G: at 17:24

## 2017-12-22 RX ADMIN — HYDROCODONE BITARTRATE AND ACETAMINOPHEN 2 TABLET: 5; 325 TABLET ORAL at 20:12

## 2017-12-22 RX ADMIN — HYDROCODONE BITARTRATE AND ACETAMINOPHEN 2 TABLET: 5; 325 TABLET ORAL at 09:06

## 2017-12-22 RX ADMIN — POTASSIUM CHLORIDE, DEXTROSE MONOHYDRATE AND SODIUM CHLORIDE: 150; 5; 450 INJECTION, SOLUTION INTRAVENOUS at 12:13

## 2017-12-22 RX ADMIN — Medication 16 G: at 06:04

## 2017-12-22 RX ADMIN — POTASSIUM CHLORIDE AND SODIUM CHLORIDE: 450; 150 INJECTION, SOLUTION INTRAVENOUS at 05:54

## 2017-12-22 RX ADMIN — GABAPENTIN 300 MG: 300 CAPSULE ORAL at 20:12

## 2017-12-22 RX ADMIN — HYDROCODONE BITARTRATE AND ACETAMINOPHEN 2 TABLET: 5; 325 TABLET ORAL at 16:05

## 2017-12-22 RX ADMIN — MORPHINE SULFATE 4 MG: 4 INJECTION INTRAVENOUS at 15:50

## 2017-12-22 RX ADMIN — ATORVASTATIN CALCIUM 10 MG: 10 TABLET, FILM COATED ORAL at 20:12

## 2017-12-22 RX ADMIN — HYDROCODONE BITARTRATE AND ACETAMINOPHEN 2 TABLET: 5; 325 TABLET ORAL at 04:27

## 2017-12-22 ASSESSMENT — LIFESTYLE VARIABLES
ORIENTATION AND CLOUDING OF SENSORIUM: ORIENTED AND CAN DO SERIAL ADDITIONS
NAUSEA AND VOMITING: NO NAUSEA AND NO VOMITING
VISUAL DISTURBANCES: NOT PRESENT
NAUSEA AND VOMITING: NO NAUSEA AND NO VOMITING
AGITATION: NORMAL ACTIVITY
ANXIETY: NO ANXIETY (AT EASE)
ANXIETY: NO ANXIETY (AT EASE)
HEADACHE, FULLNESS IN HEAD: NOT PRESENT
TREMOR: NO TREMOR
AUDITORY DISTURBANCES: NOT PRESENT
VISUAL DISTURBANCES: NOT PRESENT
PAROXYSMAL SWEATS: NO SWEAT VISIBLE
HEADACHE, FULLNESS IN HEAD: NOT PRESENT
AGITATION: NORMAL ACTIVITY
NAUSEA AND VOMITING: NO NAUSEA AND NO VOMITING
TOTAL SCORE: 0
PAROXYSMAL SWEATS: NO SWEAT VISIBLE
VISUAL DISTURBANCES: NOT PRESENT
TREMOR: NO TREMOR
VISUAL DISTURBANCES: NOT PRESENT
AUDITORY DISTURBANCES: NOT PRESENT
NAUSEA AND VOMITING: NO NAUSEA AND NO VOMITING
AGITATION: NORMAL ACTIVITY
AGITATION: NORMAL ACTIVITY
TOTAL SCORE: 0
AUDITORY DISTURBANCES: NOT PRESENT
TOTAL SCORE: 0
NAUSEA AND VOMITING: NO NAUSEA AND NO VOMITING
AUDITORY DISTURBANCES: NOT PRESENT
VISUAL DISTURBANCES: NOT PRESENT
TOTAL SCORE: 0
NAUSEA AND VOMITING: NO NAUSEA AND NO VOMITING
PAROXYSMAL SWEATS: NO SWEAT VISIBLE
TREMOR: NO TREMOR
PAROXYSMAL SWEATS: NO SWEAT VISIBLE
AUDITORY DISTURBANCES: NOT PRESENT
ORIENTATION AND CLOUDING OF SENSORIUM: ORIENTED AND CAN DO SERIAL ADDITIONS
TREMOR: NO TREMOR
PAROXYSMAL SWEATS: NO SWEAT VISIBLE
VISUAL DISTURBANCES: NOT PRESENT
ANXIETY: NO ANXIETY (AT EASE)
AUDITORY DISTURBANCES: NOT PRESENT
ANXIETY: NO ANXIETY (AT EASE)
HEADACHE, FULLNESS IN HEAD: NOT PRESENT
TOTAL SCORE: 0
TREMOR: NO TREMOR
PAROXYSMAL SWEATS: NO SWEAT VISIBLE
HEADACHE, FULLNESS IN HEAD: NOT PRESENT
AGITATION: NORMAL ACTIVITY
ANXIETY: NO ANXIETY (AT EASE)
TREMOR: NO TREMOR
ORIENTATION AND CLOUDING OF SENSORIUM: ORIENTED AND CAN DO SERIAL ADDITIONS
ANXIETY: NO ANXIETY (AT EASE)
TOTAL SCORE: 0
ORIENTATION AND CLOUDING OF SENSORIUM: ORIENTED AND CAN DO SERIAL ADDITIONS
HEADACHE, FULLNESS IN HEAD: NOT PRESENT
ORIENTATION AND CLOUDING OF SENSORIUM: ORIENTED AND CAN DO SERIAL ADDITIONS
AGITATION: NORMAL ACTIVITY
ORIENTATION AND CLOUDING OF SENSORIUM: ORIENTED AND CAN DO SERIAL ADDITIONS
HEADACHE, FULLNESS IN HEAD: NOT PRESENT

## 2017-12-22 ASSESSMENT — PAIN SCALES - GENERAL
PAINLEVEL_OUTOF10: 4
PAINLEVEL_OUTOF10: 7
PAINLEVEL_OUTOF10: 10
PAINLEVEL_OUTOF10: 4
PAINLEVEL_OUTOF10: 6
PAINLEVEL_OUTOF10: 10
PAINLEVEL_OUTOF10: 6
PAINLEVEL_OUTOF10: 4

## 2017-12-22 NOTE — PROGRESS NOTES
Report received. Assumed care. Pt in bed awake. A/O x4. VSS. Responds appropriately. C/O pain, medicated per MAR, no SOB.  in use. Assessment complete. Discussed POC, NPO, surgery today, check FSBS, pain control, safety, DC planning, pt verbalizes understanding. Explained importance of calling before getting OOB. Call light and belongings within reach. Pt refuses bed alarm despite education. Bed in the lowest position. Treaded socks in place. Hourly rounding in progress. Will continue to monitor .

## 2017-12-22 NOTE — PROGRESS NOTES
Assumed care of pt 1845. Pt resting in bed.  Reviewed POC including safety, ROM, pain management, medication administration, DVT prophylaxis, and surgery. Pt NPO at 0500. Pt provided snacks and fluids before. Call light within reach. Pt calls appropriately. Hourly rounding in place. Pt has no needs or concerns at this time.

## 2017-12-22 NOTE — PROGRESS NOTES
[facility] FAMILY MEDICINE PROGRESS NOTE     Attending: Priya Diaz MD    Resident: Moraima Velásquez MD & Felix Siegel MD    PATIENT: Colton Prasad; [id number]; 1970    ID: 47 y.o. male admitted for MGLF and left hip fracture, with complicating T1DM, alcoholism and IV drug use.    SUBJECTIVE: Made NPO at 5:00am by ortho--potential surgery today?  No ativan needed for CIWA.  No sliding scale insulin given.  Lantus increased to 32 units yesterday.  Pain controlled with PO norco and IV morphine.     OBJECTIVE:     Vitals:    12/21/17 1600 12/21/17 2053 12/22/17 0055 12/22/17 0351   BP: (!) 96/63 108/72 105/68 107/66   Pulse: 77 85 80 85   Resp: 16 19 16 20   Temp: 36.1 °C (97 °F) 36.2 °C (97.1 °F) 35.8 °C (96.5 °F) 35.8 °C (96.5 °F)   SpO2: 99% 99% 95% 97%   Weight:       Height:           Intake/Output Summary (Last 24 hours) at 12/22/17 0632  Last data filed at 12/21/17 1800   Gross per 24 hour   Intake             2260 ml   Output             1200 ml   Net             1060 ml       PE:  General: No acute distress, afebrile, resting comfortably  HEENT: NC/AT. EOMI. MMM, neck supple without adenopathy or mass  Cardiovascular: RRR, NMGR, cap refill brisk.  Respiratory: CTAB, no tachypnea or retractions  Abdomen: soft, NT/ND, no masses  EXT:  Left lower extremity with external rotation. Exam limited due to pain.  Pulses present in bilateral LE.  Small abrasions noted on bilateral shins.    Neuro: Non-focal    LABS:  Recent Labs      12/20/17 2045 12/22/17   0222   WBC  9.1  4.4*   RBC  5.01  4.31*   HEMOGLOBIN  14.1  11.8*   HEMATOCRIT  42.3  35.7*   MCV  84.4  82.8   MCH  28.1  27.4   RDW  39.1  38.7   PLATELETCT  210  183   MPV  11.1  11.1   NEUTSPOLYS  83.90*   --    LYMPHOCYTES  8.30*   --    MONOCYTES  6.30   --    EOSINOPHILS  0.90   --    BASOPHILS  0.30   --      Recent Labs      12/20/17 2001 12/22/17   0222   SODIUM  135  138   POTASSIUM  3.9  3.9   CHLORIDE  103  105   CO2  22  24   BUN  24*  17    CREATININE  0.68  0.53   CALCIUM  9.2  8.1*   MAGNESIUM  2.0   --    ALBUMIN  3.8  2.8*     Estimated GFR/CRCL = Estimated Creatinine Clearance: 183 mL/min (by C-G formula based on SCr of 0.53 mg/dL).  Recent Labs      12/20/17 2001 12/21/17   1154  12/21/17   1652  12/21/17 2008 12/22/17 0222   GLUCOSE  352*   --    --    --    --   85   POCGLUCOSE   --    < >  156*  160*  181*   --     < > = values in this interval not displayed.     Recent Labs      12/20/17 2001 12/22/17 0222   ASTSGOT  24  16   ALTSGPT  30  21   TBILIRUBIN  0.5  0.4   ALKPHOSPHAT  66  52   GLOBULIN  2.9  2.7   INR  1.09   --      Recent Labs      12/20/17 2001   TROPONINI  <0.01           Invalid input(s): [id number]  Recent Labs      12/20/17 2001   INR  1.09   APTT  30.7       MEDS:  Current Facility-Administered Medications   Medication Last Dose   • morphine (pf) 4 mg/ml injection 4 mg 4 mg at 12/21/17 1957   • hydrocodone-acetaminophen (NORCO) 5-325 MG per tablet 1-2 Tab 2 Tab at 12/22/17 0427   • insulin glargine (LANTUS) injection 32 Units 32 Units at 12/21/17 2010   • enoxaparin (LOVENOX) inj 40 mg 40 mg at 12/21/17 1650   • pneumococcal vaccine (PNEUMOVAX-23) injection 25 mcg     • senna-docusate (PERICOLACE or SENOKOT S) 8.6-50 MG per tablet 2 Tab 2 Tab at 12/20/17 2329    And   • polyethylene glycol/lytes (MIRALAX) PACKET 1 Packet      And   • magnesium hydroxide (MILK OF MAGNESIA) suspension 30 mL      And   • bisacodyl (DULCOLAX) suppository 10 mg     • ondansetron (ZOFRAN ODT) dispertab 4 mg     • acetaminophen (TYLENOL) tablet 650 mg     • insulin regular (HUMULIN R) injection 1-6 Units Stopped at 12/21/17 1100    And   • glucose 4 g chewable tablet 16 g 16 g at 12/22/17 0604    And   • dextrose 50% (D50W) injection 25 mL 25 mL at 12/22/17 0557   • 0.45% NaCl with KCl 20 mEq infusion     • atorvastatin (LIPITOR) tablet 10 mg 10 mg at 12/21/17 2004   • gabapentin (NEURONTIN) capsule 300 mg 300 mg at  12/21/17 2004   • sertraline (ZOLOFT) tablet 50 mg 50 mg at 12/21/17 1040   • ibuprofen (MOTRIN) tablet 600 mg 600 mg at 12/21/17 1040       ASSESSMENT/PLAN: 47 y.o. male admitted for MGLF and left hip fracture, with complicating T1DM, alcoholism and IV drug use.     # Left intertrochanteric femur fracture   - OR today likely--NPO since 5:00am  - Norco/morphine PRN pain  - APAP and Ibuprofen prn pain.  - SCD VTE PPX - on right only, currently holding LMWH ppx for possible upcoming surgery  - CBC in AM  - Regular diet ordered  - Needs to be NPO at midnight on Friday for OR on Saturday  - PT/OT post op     # Diabetes Mellitus Type 1  - Hyperglycemia present on admission  - Lantus while inpatient as sub for Toujeo per hospital formulary--32 units  - Insulin Sliding Scale - low intensity  - Hypoglycemia protocol  - A1c 10.6     # Hx of Alcoholism  - CIWA protocol--no need for ativan at this point  -  referral     # Depression  - Continue Sertraline home dosing     # Intravenous Drug Abuse  - Patient last used 1 day prior to admission  - Wants referral to resources and help quitting  - SW Referral     # Full Code

## 2017-12-22 NOTE — PROGRESS NOTES
Complaining of numbness/tingling to left lower extrem. Toes warm, good cap refill. Jose from ortho at bedside and updated. Attempted to reposition with pillows to help with circulation.

## 2017-12-22 NOTE — CARE PLAN
Problem: Safety  Goal: Will remain free from injury  Outcome: PROGRESSING AS EXPECTED  Treaded socks in place, bed in the lowest position,  call light and belongings within reach, pt call for assistance appropriately    Problem: Pain Management  Goal: Pain level will decrease to patient's comfort goal  Outcome: PROGRESSING AS EXPECTED  Medicated with Norco 5/325 2 tabs per MAR with adequate pain control, hourly rounding in progress    Problem: Skin Integrity  Goal: Risk for impaired skin integrity will decrease  Outcome: PROGRESSING AS EXPECTED   gelacio risk assessment, pt turns self from side to side

## 2017-12-22 NOTE — CARE PLAN
Problem: Safety  Goal: Will remain free from falls  Outcome: PROGRESSING AS EXPECTED  Bed and chair alarm in place. Bed in lowest position. Treaded socks on pt.  Pt calls for help appropriately on call light. Call light with in reach. Hourly rounding in place.        Problem: Bowel/Gastric:  Goal: Normal bowel function is maintained or improved  Pt able to have multiple bowel movements. Bowel sounds normoactive in all four quadrants.

## 2017-12-22 NOTE — PROGRESS NOTES
Hob slightly elevated. Sitting up in bed eating dinner. Pain med given as needed for pain. Vss, no problems noted.

## 2017-12-23 ENCOUNTER — APPOINTMENT (OUTPATIENT)
Dept: RADIOLOGY | Facility: MEDICAL CENTER | Age: 47
DRG: 482 | End: 2017-12-23
Attending: ORTHOPAEDIC SURGERY
Payer: MEDICAID

## 2017-12-23 LAB
ANION GAP SERPL CALC-SCNC: 5 MMOL/L (ref 0–11.9)
APTT PPP: 29.9 SEC (ref 24.7–36)
BASOPHILS # BLD AUTO: 0.8 % (ref 0–1.8)
BASOPHILS # BLD: 0.03 K/UL (ref 0–0.12)
BUN SERPL-MCNC: 8 MG/DL (ref 8–22)
CALCIUM SERPL-MCNC: 8.4 MG/DL (ref 8.5–10.5)
CHLORIDE SERPL-SCNC: 107 MMOL/L (ref 96–112)
CO2 SERPL-SCNC: 23 MMOL/L (ref 20–33)
CREAT SERPL-MCNC: 0.64 MG/DL (ref 0.5–1.4)
EOSINOPHIL # BLD AUTO: 0.2 K/UL (ref 0–0.51)
EOSINOPHIL NFR BLD: 5.6 % (ref 0–6.9)
ERYTHROCYTE [DISTWIDTH] IN BLOOD BY AUTOMATED COUNT: 40.4 FL (ref 35.9–50)
GFR SERPL CREATININE-BSD FRML MDRD: >60 ML/MIN/1.73 M 2
GLUCOSE BLD-MCNC: 175 MG/DL (ref 65–99)
GLUCOSE BLD-MCNC: 175 MG/DL (ref 65–99)
GLUCOSE BLD-MCNC: 181 MG/DL (ref 65–99)
GLUCOSE BLD-MCNC: 198 MG/DL (ref 65–99)
GLUCOSE BLD-MCNC: 264 MG/DL (ref 65–99)
GLUCOSE BLD-MCNC: 301 MG/DL (ref 65–99)
GLUCOSE SERPL-MCNC: 226 MG/DL (ref 65–99)
HCT VFR BLD AUTO: 37.6 % (ref 42–52)
HGB BLD-MCNC: 12 G/DL (ref 14–18)
IMM GRANULOCYTES # BLD AUTO: 0.01 K/UL (ref 0–0.11)
IMM GRANULOCYTES NFR BLD AUTO: 0.3 % (ref 0–0.9)
INR PPP: 1.01 (ref 0.87–1.13)
LYMPHOCYTES # BLD AUTO: 0.89 K/UL (ref 1–4.8)
LYMPHOCYTES NFR BLD: 24.8 % (ref 22–41)
MAGNESIUM SERPL-MCNC: 2 MG/DL (ref 1.5–2.5)
MCH RBC QN AUTO: 27.3 PG (ref 27–33)
MCHC RBC AUTO-ENTMCNC: 31.9 G/DL (ref 33.7–35.3)
MCV RBC AUTO: 85.5 FL (ref 81.4–97.8)
MONOCYTES # BLD AUTO: 0.43 K/UL (ref 0–0.85)
MONOCYTES NFR BLD AUTO: 12 % (ref 0–13.4)
NEUTROPHILS # BLD AUTO: 2.03 K/UL (ref 1.82–7.42)
NEUTROPHILS NFR BLD: 56.5 % (ref 44–72)
NRBC # BLD AUTO: 0 K/UL
NRBC BLD-RTO: 0 /100 WBC
PHOSPHATE SERPL-MCNC: 3.3 MG/DL (ref 2.5–4.5)
PLATELET # BLD AUTO: 173 K/UL (ref 164–446)
PMV BLD AUTO: 11.4 FL (ref 9–12.9)
POTASSIUM SERPL-SCNC: 4.3 MMOL/L (ref 3.6–5.5)
PROTHROMBIN TIME: 13 SEC (ref 12–14.6)
RBC # BLD AUTO: 4.4 M/UL (ref 4.7–6.1)
SODIUM SERPL-SCNC: 135 MMOL/L (ref 135–145)
WBC # BLD AUTO: 3.6 K/UL (ref 4.8–10.8)

## 2017-12-23 PROCEDURE — 85025 COMPLETE CBC W/AUTO DIFF WBC: CPT

## 2017-12-23 PROCEDURE — 160035 HCHG PACU - 1ST 60 MINS PHASE I: Performed by: ORTHOPAEDIC SURGERY

## 2017-12-23 PROCEDURE — 160002 HCHG RECOVERY MINUTES (STAT): Performed by: ORTHOPAEDIC SURGERY

## 2017-12-23 PROCEDURE — 83735 ASSAY OF MAGNESIUM: CPT

## 2017-12-23 PROCEDURE — 501838 HCHG SUTURE GENERAL: Performed by: ORTHOPAEDIC SURGERY

## 2017-12-23 PROCEDURE — 160036 HCHG PACU - EA ADDL 30 MINS PHASE I: Performed by: ORTHOPAEDIC SURGERY

## 2017-12-23 PROCEDURE — 700102 HCHG RX REV CODE 250 W/ 637 OVERRIDE(OP): Performed by: FAMILY MEDICINE

## 2017-12-23 PROCEDURE — C1713 ANCHOR/SCREW BN/BN,TIS/BN: HCPCS | Performed by: ORTHOPAEDIC SURGERY

## 2017-12-23 PROCEDURE — 700111 HCHG RX REV CODE 636 W/ 250 OVERRIDE (IP)

## 2017-12-23 PROCEDURE — 73502 X-RAY EXAM HIP UNI 2-3 VIEWS: CPT | Mod: LT

## 2017-12-23 PROCEDURE — A9270 NON-COVERED ITEM OR SERVICE: HCPCS | Performed by: ORTHOPAEDIC SURGERY

## 2017-12-23 PROCEDURE — 84100 ASSAY OF PHOSPHORUS: CPT

## 2017-12-23 PROCEDURE — 700102 HCHG RX REV CODE 250 W/ 637 OVERRIDE(OP)

## 2017-12-23 PROCEDURE — 110371 HCHG SHELL REV 272: Performed by: ORTHOPAEDIC SURGERY

## 2017-12-23 PROCEDURE — 36415 COLL VENOUS BLD VENIPUNCTURE: CPT

## 2017-12-23 PROCEDURE — 500891 HCHG PACK, ORTHO MAJOR: Performed by: ORTHOPAEDIC SURGERY

## 2017-12-23 PROCEDURE — 80048 BASIC METABOLIC PNL TOTAL CA: CPT

## 2017-12-23 PROCEDURE — 160041 HCHG SURGERY MINUTES - EA ADDL 1 MIN LEVEL 4: Performed by: ORTHOPAEDIC SURGERY

## 2017-12-23 PROCEDURE — 85730 THROMBOPLASTIN TIME PARTIAL: CPT

## 2017-12-23 PROCEDURE — 160009 HCHG ANES TIME/MIN: Performed by: ORTHOPAEDIC SURGERY

## 2017-12-23 PROCEDURE — 700102 HCHG RX REV CODE 250 W/ 637 OVERRIDE(OP): Performed by: ORTHOPAEDIC SURGERY

## 2017-12-23 PROCEDURE — 700111 HCHG RX REV CODE 636 W/ 250 OVERRIDE (IP): Performed by: FAMILY MEDICINE

## 2017-12-23 PROCEDURE — A6402 STERILE GAUZE <= 16 SQ IN: HCPCS | Performed by: ORTHOPAEDIC SURGERY

## 2017-12-23 PROCEDURE — 700105 HCHG RX REV CODE 258: Performed by: FAMILY MEDICINE

## 2017-12-23 PROCEDURE — A9270 NON-COVERED ITEM OR SERVICE: HCPCS | Performed by: FAMILY MEDICINE

## 2017-12-23 PROCEDURE — 160048 HCHG OR STATISTICAL LEVEL 1-5: Performed by: ORTHOPAEDIC SURGERY

## 2017-12-23 PROCEDURE — 82962 GLUCOSE BLOOD TEST: CPT | Mod: 91

## 2017-12-23 PROCEDURE — A9270 NON-COVERED ITEM OR SERVICE: HCPCS

## 2017-12-23 PROCEDURE — 770006 HCHG ROOM/CARE - MED/SURG/GYN SEMI*

## 2017-12-23 PROCEDURE — 160029 HCHG SURGERY MINUTES - 1ST 30 MINS LEVEL 4: Performed by: ORTHOPAEDIC SURGERY

## 2017-12-23 PROCEDURE — 85610 PROTHROMBIN TIME: CPT

## 2017-12-23 PROCEDURE — 700101 HCHG RX REV CODE 250: Performed by: FAMILY MEDICINE

## 2017-12-23 PROCEDURE — 700101 HCHG RX REV CODE 250

## 2017-12-23 DEVICE — SCREW LAG 10.5MM X 100MM (4TX2=8): Type: IMPLANTABLE DEVICE | Status: FUNCTIONAL

## 2017-12-23 DEVICE — SCREW CROSS LOCK 5MM X 50MM (4TX5=20): Type: IMPLANTABLE DEVICE | Status: FUNCTIONAL

## 2017-12-23 DEVICE — NAIL HIP 127.5 DEGREE 11MM X 420MM LT (4TX1=4): Type: IMPLANTABLE DEVICE | Status: FUNCTIONAL

## 2017-12-23 RX ORDER — LABETALOL HYDROCHLORIDE 5 MG/ML
10 INJECTION, SOLUTION INTRAVENOUS
Status: DISCONTINUED | OUTPATIENT
Start: 2017-12-23 | End: 2017-12-23

## 2017-12-23 RX ORDER — SODIUM CHLORIDE 9 MG/ML
INJECTION, SOLUTION INTRAVENOUS CONTINUOUS
Status: DISCONTINUED | OUTPATIENT
Start: 2017-12-23 | End: 2017-12-24

## 2017-12-23 RX ORDER — DIAZEPAM 2 MG/1
TABLET ORAL
Status: COMPLETED
Start: 2017-12-23 | End: 2017-12-23

## 2017-12-23 RX ORDER — INSULIN GLARGINE 100 [IU]/ML
16 INJECTION, SOLUTION SUBCUTANEOUS EVERY EVENING
Status: COMPLETED | OUTPATIENT
Start: 2017-12-23 | End: 2017-12-23

## 2017-12-23 RX ORDER — INSULIN GLARGINE 100 [IU]/ML
16 INJECTION, SOLUTION SUBCUTANEOUS EVERY EVENING
Status: DISCONTINUED | OUTPATIENT
Start: 2017-12-23 | End: 2017-12-23

## 2017-12-23 RX ORDER — OXYCODONE HCL 5 MG/5 ML
SOLUTION, ORAL ORAL
Status: COMPLETED
Start: 2017-12-23 | End: 2017-12-23

## 2017-12-23 RX ORDER — HYDROMORPHONE HYDROCHLORIDE 2 MG/ML
INJECTION, SOLUTION INTRAMUSCULAR; INTRAVENOUS; SUBCUTANEOUS
Status: COMPLETED
Start: 2017-12-23 | End: 2017-12-23

## 2017-12-23 RX ORDER — INSULIN GLARGINE 100 [IU]/ML
26 INJECTION, SOLUTION SUBCUTANEOUS EVERY EVENING
Status: DISCONTINUED | OUTPATIENT
Start: 2017-12-23 | End: 2017-12-23

## 2017-12-23 RX ORDER — LABETALOL HYDROCHLORIDE 5 MG/ML
10 INJECTION, SOLUTION INTRAVENOUS EVERY 4 HOURS PRN
Status: DISCONTINUED | OUTPATIENT
Start: 2017-12-23 | End: 2017-12-25 | Stop reason: HOSPADM

## 2017-12-23 RX ORDER — INSULIN GLARGINE 100 [IU]/ML
26 INJECTION, SOLUTION SUBCUTANEOUS EVERY EVENING
Status: DISCONTINUED | OUTPATIENT
Start: 2017-12-24 | End: 2017-12-25 | Stop reason: HOSPADM

## 2017-12-23 RX ORDER — DIAZEPAM 2 MG/1
2 TABLET ORAL EVERY 6 HOURS PRN
Status: DISCONTINUED | OUTPATIENT
Start: 2017-12-23 | End: 2017-12-25 | Stop reason: HOSPADM

## 2017-12-23 RX ORDER — INSULIN GLARGINE 100 [IU]/ML
10 INJECTION, SOLUTION SUBCUTANEOUS ONCE
Status: COMPLETED | OUTPATIENT
Start: 2017-12-23 | End: 2017-12-23

## 2017-12-23 RX ORDER — MEPERIDINE HYDROCHLORIDE 25 MG/ML
INJECTION INTRAMUSCULAR; INTRAVENOUS; SUBCUTANEOUS
Status: COMPLETED
Start: 2017-12-23 | End: 2017-12-23

## 2017-12-23 RX ADMIN — MEPERIDINE HYDROCHLORIDE 12.5 MG: 25 INJECTION INTRAMUSCULAR; INTRAVENOUS; SUBCUTANEOUS at 11:13

## 2017-12-23 RX ADMIN — INSULIN GLARGINE 16 UNITS: 100 INJECTION, SOLUTION SUBCUTANEOUS at 22:03

## 2017-12-23 RX ADMIN — HYDROMORPHONE HYDROCHLORIDE 0.5 MG: 2 INJECTION INTRAMUSCULAR; INTRAVENOUS; SUBCUTANEOUS at 11:09

## 2017-12-23 RX ADMIN — FENTANYL CITRATE 50 MCG: 50 INJECTION, SOLUTION INTRAMUSCULAR; INTRAVENOUS at 10:40

## 2017-12-23 RX ADMIN — GABAPENTIN 300 MG: 300 CAPSULE ORAL at 20:30

## 2017-12-23 RX ADMIN — INSULIN HUMAN 4 UNITS: 100 INJECTION, SOLUTION PARENTERAL at 20:20

## 2017-12-23 RX ADMIN — HYDROMORPHONE HYDROCHLORIDE 0.5 MG: 2 INJECTION INTRAMUSCULAR; INTRAVENOUS; SUBCUTANEOUS at 11:00

## 2017-12-23 RX ADMIN — MORPHINE SULFATE 4 MG: 4 INJECTION INTRAVENOUS at 04:23

## 2017-12-23 RX ADMIN — HYDROMORPHONE HYDROCHLORIDE 0.5 MG: 2 INJECTION INTRAMUSCULAR; INTRAVENOUS; SUBCUTANEOUS at 11:35

## 2017-12-23 RX ADMIN — POTASSIUM CHLORIDE, DEXTROSE MONOHYDRATE AND SODIUM CHLORIDE: 150; 5; 450 INJECTION, SOLUTION INTRAVENOUS at 00:06

## 2017-12-23 RX ADMIN — INSULIN HUMAN 3 UNITS: 100 INJECTION, SOLUTION PARENTERAL at 16:32

## 2017-12-23 RX ADMIN — MORPHINE SULFATE 4 MG: 4 INJECTION INTRAVENOUS at 20:26

## 2017-12-23 RX ADMIN — SODIUM CHLORIDE: 9 INJECTION, SOLUTION INTRAVENOUS at 22:02

## 2017-12-23 RX ADMIN — HYDROMORPHONE HYDROCHLORIDE 0.5 MG: 2 INJECTION INTRAMUSCULAR; INTRAVENOUS; SUBCUTANEOUS at 10:50

## 2017-12-23 RX ADMIN — DIAZEPAM 2 MG: 2 TABLET ORAL at 14:17

## 2017-12-23 RX ADMIN — HYDROCODONE BITARTRATE AND ACETAMINOPHEN 2 TABLET: 5; 325 TABLET ORAL at 16:40

## 2017-12-23 RX ADMIN — MORPHINE SULFATE 4 MG: 4 INJECTION INTRAVENOUS at 17:05

## 2017-12-23 RX ADMIN — OXYCODONE HYDROCHLORIDE 10 MG: 5 SOLUTION ORAL at 10:45

## 2017-12-23 RX ADMIN — INSULIN GLARGINE 10 UNITS: 100 INJECTION, SOLUTION SUBCUTANEOUS at 15:19

## 2017-12-23 RX ADMIN — FENTANYL CITRATE 50 MCG: 50 INJECTION, SOLUTION INTRAMUSCULAR; INTRAVENOUS at 10:45

## 2017-12-23 RX ADMIN — ATORVASTATIN CALCIUM 10 MG: 10 TABLET, FILM COATED ORAL at 20:29

## 2017-12-23 RX ADMIN — HYDROCODONE BITARTRATE AND ACETAMINOPHEN 2 TABLET: 5; 325 TABLET ORAL at 22:01

## 2017-12-23 RX ADMIN — HYDROCODONE BITARTRATE AND ACETAMINOPHEN 2 TABLET: 5; 325 TABLET ORAL at 00:05

## 2017-12-23 RX ADMIN — HYDROCODONE BITARTRATE AND ACETAMINOPHEN 2 TABLET: 5; 325 TABLET ORAL at 05:48

## 2017-12-23 ASSESSMENT — PAIN SCALES - GENERAL
PAINLEVEL_OUTOF10: 6
PAINLEVEL_OUTOF10: 8
PAINLEVEL_OUTOF10: 3
PAINLEVEL_OUTOF10: 7
PAINLEVEL_OUTOF10: 10
PAINLEVEL_OUTOF10: 4
PAINLEVEL_OUTOF10: 4
PAINLEVEL_OUTOF10: 6
PAINLEVEL_OUTOF10: 5
PAINLEVEL_OUTOF10: 6
PAINLEVEL_OUTOF10: 4
PAINLEVEL_OUTOF10: 5
PAINLEVEL_OUTOF10: 4
PAINLEVEL_OUTOF10: 8
PAINLEVEL_OUTOF10: 10
PAINLEVEL_OUTOF10: 5
PAINLEVEL_OUTOF10: 3
PAINLEVEL_OUTOF10: 0
PAINLEVEL_OUTOF10: 7

## 2017-12-23 ASSESSMENT — LIFESTYLE VARIABLES
ORIENTATION AND CLOUDING OF SENSORIUM: ORIENTED AND CAN DO SERIAL ADDITIONS
HEADACHE, FULLNESS IN HEAD: NOT PRESENT
AGITATION: NORMAL ACTIVITY
NAUSEA AND VOMITING: NO NAUSEA AND NO VOMITING
PAROXYSMAL SWEATS: NO SWEAT VISIBLE
ANXIETY: NO ANXIETY (AT EASE)
VISUAL DISTURBANCES: NOT PRESENT
TOTAL SCORE: 0
AUDITORY DISTURBANCES: NOT PRESENT
TREMOR: NO TREMOR

## 2017-12-23 NOTE — PROGRESS NOTES
[facility] MEDICINE PROGRESS NOTE     Attending: Edmar Kidd MD    Resident: Moraima Velásquez MD & Felix Siegel MD    PATIENT: Colton Prasad; [id number]; 1970    ID: 47 y.o. male admitted for MGLF and left hip fracture, with complicating T1DM, alcoholism and IV drug use.    SUBJECTIVE: Patient reports sleeping well, pain under control and BM yesterday. He denies HA, dizziness, CP, SOB, ABD pain, N/V, or fever/chills.     NPO since midnight for anticipated surgery today. Surgery canceled yesterday given no available anesthesiologist due emergency case. Lovenox held (last dose 12/21). Lantus held last night due to low blood glucose 61 x2 in the evening with -181 this AM.    OBJECTIVE:     Vitals:    12/22/17 1530 12/22/17 1749 12/22/17 2106 12/23/17 0441   BP: 124/76 125/72 137/80 (!) 95/61   Pulse: 79 89 88 61   Resp: 18 16 19 19   Temp: 36.3 °C (97.4 °F) 37.4 °C (99.3 °F) 36.7 °C (98.1 °F) 36.4 °C (97.5 °F)   SpO2: 98% 100% 100% 94%   Weight:       Height:             Intake/Output Summary (Last 24 hours) at 12/23/17 0647  Last data filed at 12/23/17 0442   Gross per 24 hour   Intake             1275 ml   Output             3350 ml   Net            -2075 ml         PE:  General: No acute distress, afebrile, resting comfortably  HEENT: NC/AT. EOMI. MMM, neck supple without adenopathy or mass  Cardiovascular: RRR, NMGR, cap refill brisk.  Respiratory: CTAB, no tachypnea or retractions  Abdomen: soft, NT/ND, no masses  EXT:  Left lower extremity with external rotation. Exam limited due to pain.  Pulses present in bilateral LE.  Small abrasions noted on bilateral shins.    Neuro: Non-focal    LABS:  Recent Labs      12/20/17   2045  12/22/17   0222  12/23/17   0620   WBC  9.1  4.4*  3.6*   RBC  5.01  4.31*  4.40*   HEMOGLOBIN  14.1  11.8*  12.0*   HEMATOCRIT  42.3  35.7*  37.6*   MCV  84.4  82.8  85.5   MCH  28.1  27.4  27.3   RDW  39.1  38.7  40.4   PLATELETCT  210  183  173   MPV  11.1  11.1  11.4    NEUTSPOLYS  83.90*   --   56.50   LYMPHOCYTES  8.30*   --   24.80   MONOCYTES  6.30   --   12.00   EOSINOPHILS  0.90   --   5.60   BASOPHILS  0.30   --   0.80     Recent Labs      12/20/17 2001 12/22/17 0222 12/22/17   1443   SODIUM  135  138  136   POTASSIUM  3.9  3.9  4.1   CHLORIDE  103  105  109   CO2  22  24  23   BUN  24*  17  13   CREATININE  0.68  0.53  0.58   CALCIUM  9.2  8.1*  8.4*   MAGNESIUM  2.0   --    --    ALBUMIN  3.8  2.8*  3.2     Estimated GFR/CRCL = Estimated Creatinine Clearance: 167.2 mL/min (by C-G formula based on SCr of 0.58 mg/dL).  Recent Labs      12/20/17 2001 12/22/17 0222 12/22/17 1443   12/22/17   2014  12/23/17   0004  12/23/17   0546   GLUCOSE  352*   --   85   --   94   --    --    --    --    POCGLUCOSE   --    < >   --    < >   --    < >  124*  175*  181*    < > = values in this interval not displayed.     Recent Labs      12/20/17 2001 12/22/17 0222 12/22/17   1443   ASTSGOT  24  16  16   ALTSGPT  30  21  18   TBILIRUBIN  0.5  0.4  0.3   ALKPHOSPHAT  66  52  50   GLOBULIN  2.9  2.7  2.6   INR  1.09   --    --      Recent Labs      12/20/17 2001   TROPONINI  <0.01           Invalid input(s): [id number]  Recent Labs      12/20/17 2001   INR  1.09   APTT  30.7       MEDS:  Current Facility-Administered Medications   Medication Last Dose   • dextrose 5 % and 0.45 % NaCl with KCl 20 mEq     • morphine (pf) 4 mg/ml injection 4 mg 4 mg at 12/23/17 0423   • hydrocodone-acetaminophen (NORCO) 5-325 MG per tablet 1-2 Tab 2 Tab at 12/23/17 0548   • insulin glargine (LANTUS) injection 32 Units Stopped at 12/22/17 2100   • enoxaparin (LOVENOX) inj 40 mg Stopped at 12/22/17 0900   • pneumococcal vaccine (PNEUMOVAX-23) injection 25 mcg     • senna-docusate (PERICOLACE or SENOKOT S) 8.6-50 MG per tablet 2 Tab 2 Tab at 12/20/17 4126    And   • polyethylene glycol/lytes (MIRALAX) PACKET 1 Packet      And   • magnesium hydroxide (MILK OF MAGNESIA) suspension 30 mL       And   • bisacodyl (DULCOLAX) suppository 10 mg     • ondansetron (ZOFRAN ODT) dispertab 4 mg     • acetaminophen (TYLENOL) tablet 650 mg     • insulin regular (HUMULIN R) injection 1-6 Units Stopped at 12/21/17 1100    And   • glucose 4 g chewable tablet 16 g 16 g at 12/22/17 1724    And   • dextrose 50% (D50W) injection 25 mL 25 mL at 12/22/17 0557   • atorvastatin (LIPITOR) tablet 10 mg 10 mg at 12/22/17 2012   • gabapentin (NEURONTIN) capsule 300 mg 300 mg at 12/22/17 2012   • sertraline (ZOLOFT) tablet 50 mg 50 mg at 12/21/17 1040   • ibuprofen (MOTRIN) tablet 600 mg 600 mg at 12/21/17 1040       ASSESSMENT/PLAN: 47 y.o. male admitted for MGLF and left hip fracture, with complicating T1DM, alcoholism and IV drug use.     # Left intertrochanteric femur fracture   - OR today likely-NPO since MN  - Norco/morphine PRN pain  - APAP and Ibuprofen prn pain.  - SCD VTE PPX - holding anticoagulation for surgery  - Regular diet ordered  -Currently NPO  - PT/OT post op     # Diabetes Mellitus Type 1  #Hypoglycemia, BG 61 x2 during the day while NPO despite D5 IVMF  - Hyperglycemia present on admission  - Currently on D5 IVMF while NPO, will consider switching after surgery  - Lantus held last night, will restart Lantus at 26 units after surgery(home Toujeo 32 unit equivalent)  - Insulin Sliding Scale - low intensity  - Hypoglycemia protocol  - A1c 10.6     # Hx of Alcoholism  - CIWA protocol--no need for ativan at this point  -  referral     # Depression  - Continue Sertraline home dosing     # Intravenous Drug Abuse  - Patient last used 1 day prior to admission  - Wants referral to resources and help quitting  -  Referral     # Full Code

## 2017-12-23 NOTE — PROGRESS NOTES
Diabetes education: Met with patient briefly as he was in pain and had just received pain medication. Please see consult note.  CDE will follow up on Tuesday if patient remains in the hospital. Please call [phone] if needs change.

## 2017-12-23 NOTE — CARE PLAN
Problem: Safety  Goal: Will remain free from injury  Outcome: PROGRESSING AS EXPECTED  Provided assistance with mobility. Fall prevention measures in place. rounds ongoing.    Problem: Pain Management  Goal: Pain level will decrease to patient's comfort goal  Outcome: PROGRESSING AS EXPECTED  Pain medications given per MAR. Other non-pharmacologic measures for pain utilized.

## 2017-12-23 NOTE — PROGRESS NOTES
Pt came back to the floor after surgery has cancelled. Blood sugar rechecked: 99. Dinner tray served, will be NPO after midnight tonight for surgery tomorrow.

## 2017-12-23 NOTE — CONSULTS
Diabetes education: Met with patient this afternoon to discuss diabetes management. Pt has a hx of type one diabetes for 40 years. Pt states he takes Toujeo now as insurance would  Not cover Lantus ( it now will cover Basaglar). He takes a fast acting insulin per sliding scale. Pt states he uses his stomach for injections, discussed site rotation. Pt states he has a meter, strips, insulin, pen needles and syringes (fast acting only in vials). No questions at this time. Pt to undergo surgery on his hip tonight. CDE will follow up on Tuesday if patient remains in hospital. Please call [phone] if needs change.

## 2017-12-23 NOTE — OR NURSING
----- Message from Mackenzie Gallegos MD sent at 10/15/2017  9:04 PM CDT -----  Please advise negative urine culture results.  OK to discontinue macrobid.   Call placed to Dr. King; received orders for valium po for pt's LLE muscle spasms.

## 2017-12-23 NOTE — PROGRESS NOTES
S: 46 yo M s/p left intertrochanteric femur fractures  Patient is alert and answering question appropriately. He denies lower leg pain, nausea, HA, dizziness, abdominal pain.  O: Afebrile, /85, HR 96, RR 12, SpO2 97% in room-air  PE: A&Ox4, lungs CTAB, Cardiac RRR, no murmurs, warm and well purfused, distal LE warm, well perfused, pulse intact, surgical site with dressing c/d/i    A/P  -encouraged liquid diet and soft food and advance diet as tolerated  -added Labetalol IV 10mg  PRN for SBP >180, DBP >110  -ordered ACCU FSBG checks now, then Q4 hours until AM  -ordered Lantus 10 units now  -continue ISS, ACHS FSBG checks, hypoglycemic protocol  -changed Lantus QHS dose tonight to 26-->16 units  -repeat CBC and BMP in AM  -d/c IVMF once patient is tolerated PO intake

## 2017-12-23 NOTE — OR NURSING
Pt tolerating orals; moaning in pain; attempting to reposition to position of comfort. Denies anxiety.

## 2017-12-23 NOTE — PROGRESS NOTES
Rechecked FSBS per pt request at 1720.FSBS = 67. Glucose tabs 4 given per MD order. Notified preop RN. Pt left for surgery at 1728.

## 2017-12-23 NOTE — PROGRESS NOTES
Pt has returned to floor from PACU w/out event. RN and CNA have completed initial assessments, post op VS started. Pt has SCD's on. Pt is calm, and sleeping. Hourly rounds ongoing, call light w/in reach.

## 2017-12-23 NOTE — PROGRESS NOTES
"Seen and examined, NPO for surgery.    Blood pressure 125/72, pulse 89, temperature 37.4 °C (99.3 °F), resp. rate 16, height 1.854 m (6' 1\"), weight 75.1 kg (165 lb 9.1 oz), SpO2 100 %.      Exam:  Pain with movement of left hip and extremity    #1 Left subtrochanteric femur fracture    - To OR today for IM nail  - NPO  - WBAT post-op  "

## 2017-12-23 NOTE — PROGRESS NOTES
"Pt resting in bed, sleeping intermittently, calm, cooperative, flat affect - currently states pain managed on PRN med. RN has given report to pre-op RN. Pt has SCDs at bedside but has refused at this time, will do EDU after surgery - Lovenox ordered for VTE. RN and CNA have collaborated to ensure pt is repositioned frequently for skin integrity. Fall and safety precautions in place, pt uses call light appropriately. RN will reinforce CDB w/ \"I.S.\" t/o day. Hourly rounds ongoing, call light w/in reach.   "

## 2017-12-23 NOTE — PROGRESS NOTES
RN present for rounds at bedside by DR Siegel. POC discussed. Dr aware of elevated BP currently, has stated he feels it will self resolve / and or RN to use PRN med if SBP remains elevated.

## 2017-12-23 NOTE — OP REPORT
DATE OF OPERATION: 12/23/2017     PREOPERATIVE DIAGNOSIS: left intertrochanteric femur fracture    POSTOPERATIVE DIAGNOSIS: Same    PROCEDURE PERFORMED: Surgical treatment of left intertrochanteric femur fracture with intramedullary device    SURGEON: Brody King M.D.     ASSISTANT:  Jose Syed PA-C    ANESTHESIOLOGIST: Ariel Guajardo MD.    ANESTHESIA: General    ASA CLASSIFICATION: II    INDICATIONS: The patient is a 47 y.o. male with a left intertrochanteric femur fracture resulting from a ground level fall.  The patient denies antecedent pain, and was found to have a normal neurovascular exam and skin envelope.  Radiographs demonstrated the intertrochanteric femur fracture.  Given these findings, the patient is an appropriately indicated candidate for surgical treatment of the intertrochanteric fracture with an intramedullary device.  I discussed the risks and benefits of the procedure, including the risks of infection, wound healing complication, neurovascular injury, persistent hip pain, malunion, non-union, malrotation, and the medical risks of anesthesia including MI, stroke, and death.  Benefits include early mobilization, improved chance of union, and reduction in the medical risks of hip fractures.  Alternatives to surgery were also discussed, including non-operative management, which I did not recommend.  The patient was in agreement with the plan to proceed, and the informed consent was signed and documented.  I met with the patient pre-operatively and marked the operative extremity with their agreement.  We proceeded to the operating room.     WOUND CLASSIFICATION: Class I    SPECIMEN: None    ESTIMATED BLOOD LOSS: 25 mL    PROCEDURE:  The patient underwent anesthesia, and was positioned supine on the fracture table with all bony prominences were well padded.  Sequential compression devices were employed.  Preoperative imaging was obtained, demonstrated anatomic reduction of the fracture using  the table. The correct operative site was prepped and draped into a sterile field, and the surgical team scrubbed in.  A procedural pause was conducted to verify correct patient, correct extremity, presence of the surgeons initials on the operative extremity, and administration of IV antibiotics, in this case, Ancef.    The starting guide pin for the OIC hip nail was advanced down to a start point on the greater trochanter, and its position was confirmed on fluoroscopy.  This was advanced into the femur on power.  An incision was made around the pin, and the entry reamer was then used to gain access to the femoral canal.  The guide pin was then removed.    A long ball-tip guide wire was advanced down the femur to the knee, its position confirmed on fluoroscopy.  We then measured for, and selected a 42 cm x 11mm x 127.5 degree OIC hip nail.  We then sequentially reamed to a size 13 mm reamer, and advanced the nail over the guide pin to an appropriate depth, confirmed by fluoroscopy.    The guide for the lag screw was then advanced down to bone through a lateral thigh incision.  The starting guide pin was advanced to a central position within the femoral neck/head, confirmed by fluoroscopy.  We measured our lag screw, and reamed for our lag screw.  We then placed the lag screw by hand.  The fracture was compressed, and the set screw was locked proximally.  All instruments were removed from the proximal femur, and final fluoroscopic images obtained.    We then obtained perfect [place] imaging distally, and placed one statically interlocked distal screw, obtaining good bicortical purchase.    We then irrigated all wounds with normal saline, and closed in layers, with 2-0 Vicryl in the subcutaneous tissue, and staples in the skin.  Sterile dressings were applied.       The patient tolerated the procedure well. There were no apparent complications. All sponge, needle, and instrument counts were correct on two separate  occasions. He was awakened, extubated, and transferred to the recovery room in satisfactory condition.     Post-Operative Plan:    1.  The patient should bear weight as tolerated on their operative extremity.  Gait aids (crutch or crutches, cane, walker) may be used as needed, and may be discontinued when no longer required.  2.  IV antibiotics - may be continued for 24 hours, but are not required.  3.  DVT prophylaxis - SCD's and Lovenox 40 mg SQ daily while inpatient.  The patient may transition to Aspirin 325 mg PO BID as an outpatient  4.  Discharge planning  ____________________________________   Brody King M.D.   DD: 12/23/2017  10:26 AM

## 2017-12-23 NOTE — PROGRESS NOTES
"Pt had been complaining of \"stiffness\" to LLE, PRN valium given, pt now sleeping comfortably, no c/o pain or \"cramps\"  "

## 2017-12-23 NOTE — PROGRESS NOTES
Surgery canceled today due to loss of anesthesiologist to emergency case and hypoglycemia.    Plan:  - General diabetic diet, NPO after midnight  - To OR in AM for IM nail L femur

## 2017-12-24 LAB
ANION GAP SERPL CALC-SCNC: 5 MMOL/L (ref 0–11.9)
BUN SERPL-MCNC: 7 MG/DL (ref 8–22)
CALCIUM SERPL-MCNC: 8.7 MG/DL (ref 8.5–10.5)
CHLORIDE SERPL-SCNC: 104 MMOL/L (ref 96–112)
CO2 SERPL-SCNC: 25 MMOL/L (ref 20–33)
CREAT SERPL-MCNC: 0.6 MG/DL (ref 0.5–1.4)
GFR SERPL CREATININE-BSD FRML MDRD: >60 ML/MIN/1.73 M 2
GLUCOSE BLD-MCNC: 104 MG/DL (ref 65–99)
GLUCOSE BLD-MCNC: 257 MG/DL (ref 65–99)
GLUCOSE SERPL-MCNC: 182 MG/DL (ref 65–99)
POTASSIUM SERPL-SCNC: 3.9 MMOL/L (ref 3.6–5.5)
SODIUM SERPL-SCNC: 134 MMOL/L (ref 135–145)

## 2017-12-24 PROCEDURE — 700105 HCHG RX REV CODE 258: Performed by: FAMILY MEDICINE

## 2017-12-24 PROCEDURE — A9270 NON-COVERED ITEM OR SERVICE: HCPCS | Performed by: FAMILY MEDICINE

## 2017-12-24 PROCEDURE — 700111 HCHG RX REV CODE 636 W/ 250 OVERRIDE (IP): Performed by: FAMILY MEDICINE

## 2017-12-24 PROCEDURE — 700102 HCHG RX REV CODE 250 W/ 637 OVERRIDE(OP): Performed by: FAMILY MEDICINE

## 2017-12-24 PROCEDURE — G8988 SELF CARE GOAL STATUS: HCPCS | Mod: CI

## 2017-12-24 PROCEDURE — 36415 COLL VENOUS BLD VENIPUNCTURE: CPT

## 2017-12-24 PROCEDURE — 82962 GLUCOSE BLOOD TEST: CPT | Mod: 91

## 2017-12-24 PROCEDURE — 700102 HCHG RX REV CODE 250 W/ 637 OVERRIDE(OP): Performed by: ORTHOPAEDIC SURGERY

## 2017-12-24 PROCEDURE — A9270 NON-COVERED ITEM OR SERVICE: HCPCS | Performed by: ORTHOPAEDIC SURGERY

## 2017-12-24 PROCEDURE — G8978 MOBILITY CURRENT STATUS: HCPCS | Mod: CJ

## 2017-12-24 PROCEDURE — 97162 PT EVAL MOD COMPLEX 30 MIN: CPT

## 2017-12-24 PROCEDURE — G8987 SELF CARE CURRENT STATUS: HCPCS | Mod: CJ

## 2017-12-24 PROCEDURE — 80048 BASIC METABOLIC PNL TOTAL CA: CPT

## 2017-12-24 PROCEDURE — G8979 MOBILITY GOAL STATUS: HCPCS | Mod: CI

## 2017-12-24 PROCEDURE — 770006 HCHG ROOM/CARE - MED/SURG/GYN SEMI*

## 2017-12-24 PROCEDURE — 97165 OT EVAL LOW COMPLEX 30 MIN: CPT

## 2017-12-24 RX ADMIN — DIAZEPAM 2 MG: 2 TABLET ORAL at 21:21

## 2017-12-24 RX ADMIN — STANDARDIZED SENNA CONCENTRATE AND DOCUSATE SODIUM 2 TABLET: 8.6; 5 TABLET, FILM COATED ORAL at 20:58

## 2017-12-24 RX ADMIN — ONDANSETRON 4 MG: 4 TABLET, ORALLY DISINTEGRATING ORAL at 11:33

## 2017-12-24 RX ADMIN — INSULIN HUMAN 3 UNITS: 100 INJECTION, SOLUTION PARENTERAL at 11:38

## 2017-12-24 RX ADMIN — INSULIN HUMAN 3 UNITS: 100 INJECTION, SOLUTION PARENTERAL at 21:05

## 2017-12-24 RX ADMIN — HYDROCODONE BITARTRATE AND ACETAMINOPHEN 2 TABLET: 5; 325 TABLET ORAL at 05:22

## 2017-12-24 RX ADMIN — INSULIN HUMAN 4 UNITS: 100 INJECTION, SOLUTION PARENTERAL at 16:22

## 2017-12-24 RX ADMIN — ATORVASTATIN CALCIUM 10 MG: 10 TABLET, FILM COATED ORAL at 20:57

## 2017-12-24 RX ADMIN — HYDROCODONE BITARTRATE AND ACETAMINOPHEN 2 TABLET: 5; 325 TABLET ORAL at 16:21

## 2017-12-24 RX ADMIN — INSULIN GLARGINE 26 UNITS: 100 INJECTION, SOLUTION SUBCUTANEOUS at 20:59

## 2017-12-24 RX ADMIN — DIAZEPAM 2 MG: 2 TABLET ORAL at 16:22

## 2017-12-24 RX ADMIN — MORPHINE SULFATE 4 MG: 4 INJECTION INTRAVENOUS at 11:33

## 2017-12-24 RX ADMIN — SERTRALINE 50 MG: 50 TABLET, FILM COATED ORAL at 09:02

## 2017-12-24 RX ADMIN — HYDROCODONE BITARTRATE AND ACETAMINOPHEN 2 TABLET: 5; 325 TABLET ORAL at 10:44

## 2017-12-24 RX ADMIN — HYDROCODONE BITARTRATE AND ACETAMINOPHEN 1 TABLET: 5; 325 TABLET ORAL at 20:58

## 2017-12-24 RX ADMIN — STANDARDIZED SENNA CONCENTRATE AND DOCUSATE SODIUM 2 TABLET: 8.6; 5 TABLET, FILM COATED ORAL at 09:02

## 2017-12-24 RX ADMIN — DIAZEPAM 2 MG: 2 TABLET ORAL at 09:07

## 2017-12-24 RX ADMIN — DIAZEPAM 2 MG: 2 TABLET ORAL at 01:03

## 2017-12-24 RX ADMIN — SODIUM CHLORIDE 1000 ML: 9 INJECTION, SOLUTION INTRAVENOUS at 06:17

## 2017-12-24 RX ADMIN — GABAPENTIN 300 MG: 300 CAPSULE ORAL at 20:58

## 2017-12-24 ASSESSMENT — LIFESTYLE VARIABLES
HEADACHE, FULLNESS IN HEAD: NOT PRESENT
VISUAL DISTURBANCES: NOT PRESENT
ANXIETY: NO ANXIETY (AT EASE)
AGITATION: NORMAL ACTIVITY
AUDITORY DISTURBANCES: NOT PRESENT
TOTAL SCORE: 0
TREMOR: NO TREMOR
ORIENTATION AND CLOUDING OF SENSORIUM: ORIENTED AND CAN DO SERIAL ADDITIONS
NAUSEA AND VOMITING: NO NAUSEA AND NO VOMITING
DOES PATIENT WANT TO STOP DRINKING: NO
PAROXYSMAL SWEATS: NO SWEAT VISIBLE

## 2017-12-24 ASSESSMENT — PAIN SCALES - GENERAL
PAINLEVEL_OUTOF10: 3
PAINLEVEL_OUTOF10: 8
PAINLEVEL_OUTOF10: 8
PAINLEVEL_OUTOF10: ASSUMED PAIN PRESENT
PAINLEVEL_OUTOF10: 6
PAINLEVEL_OUTOF10: 0
PAINLEVEL_OUTOF10: 5
PAINLEVEL_OUTOF10: 7

## 2017-12-24 ASSESSMENT — ACTIVITIES OF DAILY LIVING (ADL): TOILETING: INDEPENDENT

## 2017-12-24 ASSESSMENT — GAIT ASSESSMENTS
DISTANCE (FEET): 5
ASSISTIVE DEVICE: FRONT WHEEL WALKER
DEVIATION: STEP TO;DECREASED BASE OF SUPPORT;DECREASED HEEL STRIKE;DECREASED TOE OFF;OTHER (COMMENT)
GAIT LEVEL OF ASSIST: MINIMAL ASSIST

## 2017-12-24 NOTE — THERAPY
"Physical Therapy Evaluation completed.   Bed Mobility:  Supine to Sit:  (pt up in chair and wanted to return to chair )  Transfers: Sit to Stand: Stand by Assist/CGA to FWW; min A for transfer  Gait: Level Of Assist: Minimal Assist (assist with placement of FWW and during LLE stance) with Front-Wheel Walker; limited distances 2' to current pain; see below      Plan of Care: Will benefit from Physical Therapy 3 times per week  Discharge Recommendations: Equipment: Front-Wheel Walker and Will Continue to Assess for Equipment Needs. See below    Pt presents to PT s/p IM nailing after fall with L hip fracture. Noted PT visit was pt's 1st attempt OOB and pt required min A/CGA with all bed mobility and very short distance ambulation with increased reliance on FWW and poor motor control at LLE with placement and during stance phase of gait. (Of note, pt was much improved later in day with ambulation with OT with FWW as pain optimally managed and would defer to OT as well with d/c recs). Pt will benefit from continued skilled acute PT while here and anticiapte as pain optimally managed, pt will progress well based on PLOF, age, and nature of injury. Will continue to visit and assess though pt should continue OOB activity with nsg/staff as able/appropriate. Needs to be able to demonstrate increased ambulation distances with FWW and 1 step clearance prior to d/c to home. Pt expressed interest in drug rehab prior to home. WOuld recommend continued skilled PT after d/c to home for optimal progression.     See \"Rehab Therapy-Acute\" Patient Summary Report for complete documentation.     "

## 2017-12-24 NOTE — PROGRESS NOTES
[facility] MEDICINE PROGRESS NOTE     Attending: Edmar Kidd MD    Resident: Moraima Velásquez MD    PATIENT: Colton Prasad; [id number]; 1970    ID: 47 y.o. male admitted for MGLF and left hip fracture, with complicating T1DM, alcoholism and IV drug use.    SUBJECTIVE: To OR yesterday.  Tolerated procedure well.  Higher sugars overnight, though he did not receive lantus dose from 12/22. Pain controlled overnight with PO norco.  Last dose of morphine yesterday at 5pm.      OBJECTIVE:     Vitals:    12/23/17 1600 12/23/17 2012 12/23/17 2353 12/24/17 0409   BP: 139/83 135/70 156/71 158/70   Pulse: 95 95 92 94   Resp: 20 19 17 19   Temp: 36.9 °C (98.4 °F) 37 °C (98.6 °F) 37.2 °C (98.9 °F) 37.1 °C (98.7 °F)   SpO2: 98% 98% 94% 94%   Weight:       Height:           Intake/Output Summary (Last 24 hours) at 12/24/17 0659  Last data filed at 12/24/17 0410   Gross per 24 hour   Intake              240 ml   Output             3650 ml   Net            -3410 ml       PE:  General: No acute distress, afebrile, resting comfortably  HEENT: NC/AT. EOMI. MMM, neck supple without adenopathy or mass  Cardiovascular: RRR, NMGR, cap refill brisk.  Respiratory: CTAB, no tachypnea or retractions  Abdomen: soft, NT/ND, no masses  EXT:  Surgical site in LLE with dressing c/d/i.  Pulses present.    Neuro: Non-focal    LABS:  Recent Labs      12/22/17 0222 12/23/17   0620   WBC  4.4*  3.6*   RBC  4.31*  4.40*   HEMOGLOBIN  11.8*  12.0*   HEMATOCRIT  35.7*  37.6*   MCV  82.8  85.5   MCH  27.4  27.3   RDW  38.7  40.4   PLATELETCT  183  173   MPV  11.1  11.4   NEUTSPOLYS   --   56.50   LYMPHOCYTES   --   24.80   MONOCYTES   --   12.00   EOSINOPHILS   --   5.60   BASOPHILS   --   0.80     Recent Labs      12/22/17 0222 12/22/17   1443  12/23/17   0620  12/24/17   0034   SODIUM  138  136  135  134*   POTASSIUM  3.9  4.1  4.3  3.9   CHLORIDE  105  109  107  104   CO2  24  23  23  25   BUN  17  13  8  7*   CREATININE  0.53  0.58  0.64  0.60    CALCIUM  8.1*  8.4*  8.4*  8.7   MAGNESIUM   --    --   2.0   --    PHOSPHORUS   --    --   3.3   --    ALBUMIN  2.8*  3.2   --    --      Estimated GFR/CRCL = Estimated Creatinine Clearance: 161.7 mL/min (by C-G formula based on SCr of 0.6 mg/dL).  Recent Labs      12/22/17   1443   12/23/17   0620   12/23/17   1407  12/23/17   1631  12/23/17   1948  12/24/17   0034   GLUCOSE  94   --   226*   --    --    --    --   182*   POCGLUCOSE   --    < >   --    < >  198*  264*  301*   --     < > = values in this interval not displayed.     Recent Labs      12/22/17   0222  12/22/17   1443  12/23/17   0620   ASTSGOT  16  16   --    ALTSGPT  21  18   --    TBILIRUBIN  0.4  0.3   --    ALKPHOSPHAT  52  50   --    GLOBULIN  2.7  2.6   --    INR   --    --   1.01               Invalid input(s): [id number]  Recent Labs      12/23/17 0620   INR  1.01   APTT  29.9       MEDS:  Current Facility-Administered Medications   Medication Last Dose   • diazepam (VALIUM) tablet 2 mg 2 mg at 12/24/17 0103   • labetalol (NORMODYNE,TRANDATE) injection 10 mg     • insulin glargine (LANTUS) injection 26 Units     • morphine (pf) 4 mg/ml injection 4 mg 4 mg at 12/23/17 2026   • hydrocodone-acetaminophen (NORCO) 5-325 MG per tablet 1-2 Tab 2 Tab at 12/24/17 0522   • enoxaparin (LOVENOX) inj 40 mg Stopped at 12/22/17 0900   • pneumococcal vaccine (PNEUMOVAX-23) injection 25 mcg     • senna-docusate (PERICOLACE or SENOKOT S) 8.6-50 MG per tablet 2 Tab Stopped at 12/23/17 0900    And   • polyethylene glycol/lytes (MIRALAX) PACKET 1 Packet      And   • magnesium hydroxide (MILK OF MAGNESIA) suspension 30 mL      And   • bisacodyl (DULCOLAX) suppository 10 mg     • ondansetron (ZOFRAN ODT) dispertab 4 mg     • acetaminophen (TYLENOL) tablet 650 mg     • insulin regular (HUMULIN R) injection 1-6 Units Stopped at 12/24/17 0700    And   • glucose 4 g chewable tablet 16 g 16 g at 12/22/17 1724    And   • dextrose 50% (D50W) injection 25 mL 25 mL  at 12/22/17 0557   • atorvastatin (LIPITOR) tablet 10 mg 10 mg at 12/23/17 2029   • gabapentin (NEURONTIN) capsule 300 mg 300 mg at 12/23/17 2030   • sertraline (ZOLOFT) tablet 50 mg Stopped at 12/23/17 0900   • ibuprofen (MOTRIN) tablet 600 mg 600 mg at 12/21/17 1040       ASSESSMENT/PLAN: 47 y.o. male admitted for MGLF and left hip fracture, with complicating T1DM, alcoholism and IV drug use.     # Left intertrochanteric femur fracture   - POD#1   - Norco/morphine PRN pain  - APAP and Ibuprofen prn pain.  - Lovenox--can transition to aspirin for outpatient  - Regular diet  - PT/OT post op     # Diabetes Mellitus Type 1  - Hyperglycemia present on admission  - Continue home lantus (on Toujeo at home--equivalent dosing)  - Insulin Sliding Scale - low intensity  - Hypoglycemia protocol  - A1c 10.6     # Hx of Alcoholism  - CIWA protocol--no need for ativan at this point  -  referral     # Depression  - Continue Sertraline home dosing     # Intravenous Drug Abuse  - Patient last used 1 day prior to admission  - Wants referral to resources and help quitting  - SW Referral     # Full Code

## 2017-12-24 NOTE — PROGRESS NOTES
"   Orthopaedic PA Progress Note    Interval changes:did well overnight. OOB without problems today.    ROS - Patient denies any new issues. No chest pain, dyspnea, or fever.  Pain well controlled.    Blood pressure 119/85, pulse (!) 106, temperature 36.9 °C (98.5 °F), resp. rate 16, height 1.854 m (6' 1\"), weight 75.1 kg (165 lb 9.1 oz), SpO2 98 %.    Patient seen and examined  No acute distress  Breathing non labored  RRR  Surgical dressing is clean, dry, and intact. Patient clearly fires tibialis anterior, EHL, and gastrocnemius/soleus. Sensation is intact to light touch throughout superficial peroneal, deep peroneal, tibial, saphenous, and sural nerve distributions. Strong and palpable 2+ dorsalis pedis and posterior tibial pulses with capillary refill less than 2 seconds. No lower leg tenderness or discomfort.    Recent Labs      12/22/17   0222  12/23/17   0620   WBC  4.4*  3.6*   RBC  4.31*  4.40*   HEMOGLOBIN  11.8*  12.0*   HEMATOCRIT  35.7*  37.6*   MCV  82.8  85.5   MCH  27.4  27.3   MCHC  33.1*  31.9*   RDW  38.7  40.4   PLATELETCT  183  173   MPV  11.1  11.4     Active Hospital Problems    Diagnosis   • Hip fracture ([facility]) [S72.009A]     Assessment/Plan:  POD#1 S/p L Hip nail  Wt bearing status - AT with assist, please mobilize OOB at least TID  PT/OT-initiated  Wound care:drssings CDI  Drains - no  Medley-no  Sutures/Staples out- 10-14 days post operatively  Antibiotics: completed  DVT Prophylaxis- TEDS/SCDs/Foot pumps.   Lovenox: Start 40 mg daily, Duration-until ambulatory > 150'  Future Procedures - not anticipated  Case Coordination for Discharge Planning - Disposition home when cleared by PT/O med, possibly later today or tomorrow      "

## 2017-12-24 NOTE — THERAPY
"Occupational Therapy Evaluation completed.   Functional Status:  CGA for transfers with grab bars, FWW to /from toilet; RTS with arms best   Plan of Care: Will benefit from Occupational Therapy 4 times per week  Discharge Recommendations:  Equipment: Front-Wheel Walker, Shower Chair, Raised Toilet Seat, Grab Bars and Will Continue to Assess for Equipment Needs. Post-acute therapy Discharge to home with outpatient or home health for additional skilled therapy services.    See \"Rehab Therapy-Acute\" Patient Summary Report for complete documentation.    "

## 2017-12-24 NOTE — CARE PLAN
Problem: Venous Thromboembolism (VTW)/Deep Vein Thrombosis (DVT) Prevention:  Goal: Patient will participate in Venous Thrombosis (VTE)/Deep Vein Thrombosis (DVT)Prevention Measures  Outcome: PROGRESSING AS EXPECTED  Pt has SCDs on and lovenox is ordered    Problem: Pain Management  Goal: Pain level will decrease to patient's comfort goal  Outcome: PROGRESSING AS EXPECTED  Pt pain is control with current regime.

## 2017-12-24 NOTE — PROGRESS NOTES
Received bedside shift report from night RN. Pt AOx4.  Pt reports pain is 3/10. Does call appropriately. Bed alarm is off.  Pt is resting in bed. PIV assessed and is patent. Pt is on RA. POC discussed as well as unit routine, comfort, and safety. Dicussed DC planning to home or SNF depending on PT and OT evaluation. Discussed the goal for today, mobility, BM, PT and OT and pain control. Reviewed orders, notes, labs, and test results. Hourly rounding in place with RN rounding on odd hours and CNA on even hours. 12 hour chart check completed.

## 2017-12-25 VITALS
OXYGEN SATURATION: 97 % | SYSTOLIC BLOOD PRESSURE: 137 MMHG | RESPIRATION RATE: 17 BRPM | HEART RATE: 99 BPM | BODY MASS INDEX: 21.94 KG/M2 | WEIGHT: 165.57 LBS | TEMPERATURE: 99.6 F | DIASTOLIC BLOOD PRESSURE: 71 MMHG | HEIGHT: 73 IN

## 2017-12-25 LAB
GLUCOSE BLD-MCNC: 124 MG/DL (ref 65–99)
GLUCOSE BLD-MCNC: 219 MG/DL (ref 65–99)
GLUCOSE BLD-MCNC: 300 MG/DL (ref 65–99)

## 2017-12-25 PROCEDURE — 82962 GLUCOSE BLOOD TEST: CPT | Mod: 91

## 2017-12-25 PROCEDURE — 700102 HCHG RX REV CODE 250 W/ 637 OVERRIDE(OP): Performed by: FAMILY MEDICINE

## 2017-12-25 PROCEDURE — 700102 HCHG RX REV CODE 250 W/ 637 OVERRIDE(OP): Performed by: ORTHOPAEDIC SURGERY

## 2017-12-25 PROCEDURE — 700111 HCHG RX REV CODE 636 W/ 250 OVERRIDE (IP): Performed by: FAMILY MEDICINE

## 2017-12-25 PROCEDURE — A9270 NON-COVERED ITEM OR SERVICE: HCPCS | Performed by: FAMILY MEDICINE

## 2017-12-25 PROCEDURE — A9270 NON-COVERED ITEM OR SERVICE: HCPCS | Performed by: ORTHOPAEDIC SURGERY

## 2017-12-25 RX ORDER — HYDROCODONE BITARTRATE AND ACETAMINOPHEN 5; 325 MG/1; MG/1
1-2 TABLET ORAL EVERY 4 HOURS PRN
Qty: 20 TAB | Refills: 0 | Status: SHIPPED | OUTPATIENT
Start: 2017-12-25 | End: 2018-01-08

## 2017-12-25 RX ORDER — ASPIRIN 325 MG
325 TABLET ORAL DAILY
Qty: 30 TAB | Refills: 0 | Status: SHIPPED | OUTPATIENT
Start: 2017-12-25

## 2017-12-25 RX ADMIN — DIAZEPAM 2 MG: 2 TABLET ORAL at 05:46

## 2017-12-25 RX ADMIN — MORPHINE SULFATE 4 MG: 4 INJECTION INTRAVENOUS at 07:43

## 2017-12-25 RX ADMIN — SERTRALINE 50 MG: 50 TABLET, FILM COATED ORAL at 07:45

## 2017-12-25 RX ADMIN — HYDROCODONE BITARTRATE AND ACETAMINOPHEN 2 TABLET: 5; 325 TABLET ORAL at 15:17

## 2017-12-25 RX ADMIN — HYDROCODONE BITARTRATE AND ACETAMINOPHEN 2 TABLET: 5; 325 TABLET ORAL at 11:05

## 2017-12-25 RX ADMIN — ENOXAPARIN SODIUM 40 MG: 100 INJECTION SUBCUTANEOUS at 07:42

## 2017-12-25 RX ADMIN — STANDARDIZED SENNA CONCENTRATE AND DOCUSATE SODIUM 2 TABLET: 8.6; 5 TABLET, FILM COATED ORAL at 07:44

## 2017-12-25 RX ADMIN — ONDANSETRON 4 MG: 4 TABLET, ORALLY DISINTEGRATING ORAL at 15:37

## 2017-12-25 RX ADMIN — INSULIN HUMAN 2 UNITS: 100 INJECTION, SOLUTION PARENTERAL at 11:11

## 2017-12-25 RX ADMIN — HYDROCODONE BITARTRATE AND ACETAMINOPHEN 1 TABLET: 5; 325 TABLET ORAL at 05:46

## 2017-12-25 RX ADMIN — HYDROCODONE BITARTRATE AND ACETAMINOPHEN 1 TABLET: 5; 325 TABLET ORAL at 01:05

## 2017-12-25 ASSESSMENT — LIFESTYLE VARIABLES
ORIENTATION AND CLOUDING OF SENSORIUM: ORIENTED AND CAN DO SERIAL ADDITIONS
VISUAL DISTURBANCES: NOT PRESENT
NAUSEA AND VOMITING: NO NAUSEA AND NO VOMITING
HEADACHE, FULLNESS IN HEAD: NOT PRESENT
TOTAL SCORE: 0
PAROXYSMAL SWEATS: NO SWEAT VISIBLE
EVER_SMOKED: YES
ANXIETY: NO ANXIETY (AT EASE)
TREMOR: NO TREMOR
AUDITORY DISTURBANCES: NOT PRESENT
AGITATION: NORMAL ACTIVITY

## 2017-12-25 ASSESSMENT — PAIN SCALES - GENERAL
PAINLEVEL_OUTOF10: 6
PAINLEVEL_OUTOF10: 8
PAINLEVEL_OUTOF10: 7

## 2017-12-25 NOTE — PROGRESS NOTES
Assumed care at 1900  All VSS  No complaints of pain at this time  Call Light within reach .  Patient resting comfortably

## 2017-12-25 NOTE — DISCHARGE INSTRUCTIONS
Discharge Instructions    Discharged to home by car with relative. Discharged via wheelchair, hospital escort: Yes.  Special equipment needed: Not Applicable    Be sure to schedule a follow-up appointment with your primary care doctor or any specialists as instructed.     Discharge Plan:   Diet Plan: Discussed  Activity Level: Discussed  Confirmed Follow up Appointment: Patient to Call and Schedule Appointment  Confirmed Symptoms Management: Discussed  Medication Reconciliation Updated: Yes  Pneumococcal Vaccine Given - only chart on this line when given: Given (See MAR)  Influenza Vaccine Indication: Not indicated: Previously immunized this influenza season and > 8 years of age    I understand that a diet low in cholesterol, fat, and sodium is recommended for good health. Unless I have been given specific instructions below for another diet, I accept this instruction as my diet prescription.   Other diet: 1800 Calorie Diet for Diabetes Meal Planning  The 1800 calorie diet is designed for eating up to 1800 calories each day. Following this diet and making healthy meal choices can help improve overall health. This diet controls blood sugar (glucose) levels and can also help lower blood pressure and cholesterol.  SERVING SIZES  Measuring foods and serving sizes helps to make sure you are getting the right amount of food. The list below tells how big or small some common serving sizes are:  · 1 oz.........4 stacked dice.   · 3 oz.........Deck of cards.   · 1 tsp........Tip of little finger.   · 1 tbs........Thumb.   · 2 tbs........Golf ball.   · ½ cup.......Half of a fist.   · 1 cup........A fist.   GUIDELINES FOR CHOOSING FOODS  The goal of this diet is to eat a variety of foods and limit calories to 1800 each day. This can be done by choosing foods that are low in calories and fat. The diet also suggests eating small amounts of food frequently. Doing this helps control your blood glucose levels so they do not get too  high or too low. Each meal or snack may include a protein food source to help you feel more satisfied and to stabilize your blood glucose. Try to eat about the same amount of food around the same time each day. This includes weekend days, travel days, and days off work. Space your meals about 4 to 5 hours apart and add a snack between them if you wish.   For example, a daily food plan could include breakfast, a morning snack, lunch, dinner, and an evening snack. Healthy meals and snacks include whole grains, vegetables, fruits, lean meats, poultry, fish, and dairy products. As you plan your meals, select a variety of foods. Choose from the bread and starch, vegetable, fruit, dairy, and meat/protein groups. Examples of foods from each group and their suggested serving sizes are listed below. Use measuring cups and spoons to become familiar with what a healthy portion looks like.  Bread and Starch  Each serving equals 15 grams of carbohydrates.  · 1 slice bread.   · ¼ bagel.   · ¾ cup cold cereal (unsweetened).   · ½ cup hot cereal or mashed potatoes.   · 1 small potato (size of a computer mouse).   ·  cup cooked pasta or rice.   · ½ English muffin.   · 1 cup broth-based soup.   · 3 cups of popcorn.   · 4 to 6 whole-wheat crackers.   · ½ cup cooked beans, peas, or corn.   Vegetable  Each serving equals 5 grams of carbohydrates.  · ½ cup cooked vegetables.   · 1 cup raw vegetables.   · ½ cup tomato or vegetable juice.   Fruit  Each serving equals 15 grams of carbohydrates.  · 1 small apple or orange.   · 1¼ cup watermelon or strawberries.   · ½ cup applesauce (no sugar added).   · 2 tbs raisins.   · ½ banana.   · ½ cup canned fruit, packed in water, its own juice, or sweetened with a sugar substitute.   · ½ cup unsweetened fruit juice.   Dairy  Each serving equals 12 to 15 grams of carbohydrates.  · 1 cup fat-free milk.   · 6 oz artificially sweetened yogurt or plain yogurt.   · 1 cup low-fat buttermilk.   · 1 cup soy  milk.   · 1 cup almond milk.   Meat/Protein  · 1 large egg.   · 2 to 3 oz meat, poultry, or fish.   · ¼ cup low-fat cottage cheese.   · 1 tbs peanut butter.   · 1 oz low-fat cheese.   · ¼ cup tuna in water.   · ½ cup tofu.   Fat  · 1 tsp oil.   · 1 tsp trans-fat-free margarine.   · 1 tsp butter.   · 1 tsp mayonnaise.   · 2 tbs avocado.   · 1 tbs salad dressing.   · 1 tbs cream cheese.   · 2 tbs sour cream.   SAMPLE 1800 CALORIE DIET PLAN  Breakfast  · ¾ cup unsweetened cereal (1 carb serving).   · 1 cup fat-free milk (1 carb serving).   · 1 slice whole-wheat toast (1 carb serving).   · ½ small banana (1 carb serving).   · 1 scrambled egg.   · 1 tsp trans-fat-free margarine.   Lunch  · Tuna sandwich.   · 2 slices whole-wheat bread (2 carb servings).   · ½ cup canned tuna in water, drained.   · 1 tbs reduced fat mayonnaise.   · 1 stalk celery, chopped.   · 2 slices tomato.   · 1 lettuce leaf.   · 1 cup carrot sticks.   · 24 to 30 seedless grapes (2 carb servings).   · 6 oz light yogurt (1 carb serving).   Afternoon Snack  · 3 lobo cracker squares (1 carb serving).   · Fat-free milk, 1 cup (1 carb serving).   · 1 tbs peanut butter.   Dinner  · 3 oz salmon, broiled with 1 tsp oil.   · 1 cup mashed potatoes (2 carb servings) with 1 tsp trans-fat-free margarine.   · 1 cup fresh or frozen green beans.   · 1 cup steamed asparagus.   · 1 cup fat-free milk (1 carb serving).   Evening Snack  · 3 cups air-popped popcorn (1 carb serving).   · 2 tbs parmesan cheese sprinkled on top.   MEAL PLAN  Use this worksheet to help you make a daily meal plan based on the 1800 calorie diet suggestions. If you are using this plan to help you control your blood glucose, you may interchange carbohydrate-containing foods (dairy, starches, and fruits). Select a variety of fresh foods of varying colors and flavors. The total amount of carbohydrate in your meals or snacks is more important than making sure you include all of the food groups  every time you eat. Choose from the following foods to build your day's meals:  · 8 Starches.   · 4 Vegetables.   · 3 Fruits.   · 2 Dairy.   · 6 to 7 oz Meat/Protein.   · Up to 4 Fats.   Your dietician can use this worksheet to help you decide how many servings and which types of foods are right for you.  BREAKFAST  Food Group and Servings / Food Choice  Starch ________________________________________________________  Dairy _________________________________________________________  Fruit _________________________________________________________  Meat/Protein __________________________________________________  Fat ___________________________________________________________  LUNCH  Food Group and Servings / Food Choice  Starch ________________________________________________________  Meat/Protein __________________________________________________  Vegetable _____________________________________________________  Fruit _________________________________________________________  Dairy _________________________________________________________  Fat ___________________________________________________________  AFTERNOON SNACK  Food Group and Servings / Food Choice  Starch ________________________________________________________  Meat/Protein __________________________________________________  Fruit __________________________________________________________  Dairy _________________________________________________________  DINNER  Food Group and Servings / Food Choice  Starch _________________________________________________________  Meat/Protein ___________________________________________________  Dairy __________________________________________________________  Vegetable ______________________________________________________  Fruit ___________________________________________________________  Fat ____________________________________________________________  EVENING SNACK  Food Group and Servings / Food Choice  Fruit  __________________________________________________________  Meat/Protein ___________________________________________________  Dairy __________________________________________________________  Starch _________________________________________________________  DAILY TOTALS  Starch ____________________________  Vegetable _________________________  Fruit _____________________________  Dairy _____________________________  Meat/Protein______________________  Fat _______________________________  Document Released: 07/10/2006 Document Revised: 03/11/2013 Document Reviewed: 11/02/2012  ExitCare® Patient Information ©2013 [facility].    Special Instructions: Discharge instructions for the Orthopedic Patient    Follow up with Primary Care Physician within 2 weeks of discharge to home, regarding:  Review of medications and diagnostic testing.  Surveillance for medical complications.  Workup and treatment of osteoporosis, if appropriate.     -Is this a Joint Replacement patient? No    -Is this patient being discharged with medication to prevent blood clots?  Yes, Aspirin Aspirin, ASA oral tablets  What is this medicine?  ASPIRIN (AS pir in) is a pain reliever. It is used to treat mild pain and fever. This medicine is also used as directed by a doctor to prevent and to treat heart attacks, to prevent strokes, and to treat arthritis or inflammation.  This medicine may be used for other purposes; ask your health care provider or pharmacist if you have questions.  COMMON BRAND NAME(S): Aspir-Low, Aspir-Yasmine , Aspirtab , [employer] Advanced Aspirin, [employer] Aspirin Extra Strength, [employer] Aspirin Plus, [employer] Aspirin, [employer] Genuine Aspirin, [employer] Womens Aspirin , Bufferin Extra Strength, Bufferin Low Dose, Bufferin  What should I tell my health care provider before I take this medicine?  They need to know if you have any of these conditions:  -anemia  -asthma  -bleeding problems  -child with chickenpox, the flu, or other viral  infection  -diabetes  -gout  -if you frequently drink alcohol containing drinks  -kidney disease  -liver disease  -low level of vitamin K  -lupus  -smoke tobacco  -stomach ulcers or other problems  -an unusual or allergic reaction to aspirin, tartrazine dye, other medicines, dyes, or preservatives  -pregnant or trying to get pregnant  -breast-feeding  How should I use this medicine?  Take this medicine by mouth with a glass of water. Follow the directions on the package or prescription label. You can take this medicine with or without food. If it upsets your stomach, take it with food. Do not take your medicine more often than directed.  Talk to your pediatrician regarding the use of this medicine in children. While this drug may be prescribed for children as young as 12 years of age for selected conditions, precautions do apply. Children and teenagers should not use this medicine to treat chicken pox or flu symptoms unless directed by a doctor.  Patients over 65 years old may have a stronger reaction and need a smaller dose.  Overdosage: If you think you have taken too much of this medicine contact a poison control center or emergency room at once.  NOTE: This medicine is only for you. Do not share this medicine with others.  What if I miss a dose?  If you are taking this medicine on a regular schedule and miss a dose, take it as soon as you can. If it is almost time for your next dose, take only that dose. Do not take double or extra doses.  What may interact with this medicine?  Do not take this medicine with any of the following medications:  -cidofovir  -ketorolac  -probenecid  This medicine may also interact with the following medications:  -alcohol  -alendronate  -bismuth subsalicylate  -flavocoxid  -herbal supplements like feverfew, garlic, nidia, ginkgo biloba, horse chestnut  -medicines for diabetes or glaucoma like acetazolamide, methazolamide  -medicines for gout  -medicines that treat or prevent blood  clots like enoxaparin, heparin, ticlopidine, warfarin  -other aspirin and aspirin-like medicines  -NSAIDs, medicines for pain and inflammation, like ibuprofen or naproxen  -pemetrexed  -sulfinpyrazone  -varicella live vaccine  This list may not describe all possible interactions. Give your health care provider a list of all the medicines, herbs, non-prescription drugs, or dietary supplements you use. Also tell them if you smoke, drink alcohol, or use illegal drugs. Some items may interact with your medicine.  What should I watch for while using this medicine?  If you are treating yourself for pain, tell your doctor or health care professional if the pain lasts more than 10 days, if it gets worse, or if there is a new or different kind of pain. Tell your doctor if you see redness or swelling. Also, check with your doctor if you have a fever that lasts for more than 3 days. Only take this medicine to prevent heart attacks or blood clotting if prescribed by your doctor or health care professional.  Do not take aspirin or aspirin-like medicines with this medicine. Too much aspirin can be dangerous. Always read the labels carefully.  This medicine can irritate your stomach or cause bleeding problems. Do not smoke cigarettes or drink alcohol while taking this medicine. Do not lie down for 30 minutes after taking this medicine to prevent irritation to your throat.  If you are scheduled for any medical or dental procedure, tell your healthcare provider that you are taking this medicine. You may need to stop taking this medicine before the procedure.  What side effects may I notice from receiving this medicine?  Side effects that you should report to your doctor or health care professional as soon as possible:  -allergic reactions like skin rash, itching or hives, swelling of the face, lips, or tongue  -black, tarry stools  -bloody, coffee ground-like vomit  -breathing problems  -changes in hearing, ringing in the  ears  -confusion  -general ill feeling or flu-like symptoms  -pain on swallowing  -redness, blistering, peeling or loosening of the skin, including inside the mouth or nose  -trouble passing urine or change in the amount of urine  -unusual bleeding or bruising  -unusually weak or tired  -yellowing of the eyes or skin  Side effects that usually do not require medical attention (report to your doctor or health care professional if they continue or are bothersome):  -diarrhea or constipation  -nausea, vomiting  -stomach gas, heartburn  This list may not describe all possible side effects. Call your doctor for medical advice about side effects. You may report side effects to FDA at 7-939-QKZ-8666.  Where should I keep my medicine?  Keep out of the reach of children.  Store at room temperature between 15 and 30 degrees C (59 and 86 degrees F). Protect from heat and moisture. Do not use this medicine if it has a strong vinegar smell. Throw away any unused medicine after the expiration date.  NOTE: This sheet is a summary. It may not cover all possible information. If you have questions about this medicine, talk to your doctor, pharmacist, or health care provider.  © 2014, [employer]. (3/10/2009 10:44:17 AM)      · Is patient discharged on Warfarin / Coumadin?   No     · Is patient Post Blood Transfusion?  No    Fracture Care, Generic  The 206 bones in our body are important for supporting our body (skeleton) and also for production of blood cells by the bone marrow. A fracture is a break in a tissue. A tissue is a collection of cells that performs a function or job in our body. We most commonly think of fractures in bones.  When a bone is broken, or fractured, it affects not only blood production and function. There may also be other damage when structures near the bones are injured.  There are three main types of fractures:  · Open - where there is a wound leading to the fracture site or the bone is  protruding from the skin.   · Closed - where the bone has fractured but has no external wound.   · Complicated - this may involve damage to associated vital organs and major blood vessels as a result of the fracture.   Fractures are usually managed by keeping the bones in place long enough for them to heal. This is usually done with splints or casts. Sometimes surgery is required and pins, plates and screws may be used to hold fractures in proper position. The amount of time it takes a fracture to heal depends mostly on the age and health of the patient.  Young children are prone to fractures. These fractures heal rather quickly. The common fractures suffered by children tend to be associated with the arms and wrists. As young bones do not harden for some years, children's fractures tend to 'bend and splinter', similar to a broken branch on a tree. This the reason for the name, 'greenstick fracture'.  As we grow older, there may be a loss of bone known as osteopenia or osteoporosis. These conditions make breaking a bone much easier. Sometimes a minor accident or simply over-use may produce a fracture. These fractures do not heal as fast a younger person's.  SYMPTOMS  The signs and symptoms of fractures of bones depend on how bad the injury is. If shock is present, there may be pale, cool, clammy skin with a rapid, weak pulse. There is usually pain and tenderness in the area of the fracture. There may or may not be deformity of the bone. There may be injury to surrounding tissues.  TREATMENT  · Care and treatment of fractures relies on immobilization and adequate splinting of the injury. If the fracture is complex, the wound associated with an open fracture may be difficult to handle without professional help.   · If the pulse to the end part of the limb (distal pulse) cannot be restored by gentle traction, then the limb should be stabilized in its current position. Urgent ambulance transport should be obtained. Do  not waste time with splinting.   · Generally, fractured limbs should be made immobile and left for medical aid. In remote areas or some distance from medical aid, you may be required to treat as follows.   ·    FRACTURED UPPER ARM  · Check for pulse at the end part of the limb, if none - gentle traction until pulse returns.   · Treat any wounds.   · Pad between arm and chest.   · Apply 'collar and cuff' sling, secure above and below fracture firmly against chest with triangular bandages.   · Reassess pulse or return of color and/or warmth.    FRACTURED LEG  · Check for circulation and pulse at the end part of the limb (skin color and temperature). If no circulation, apply gentle traction until pulse or color returns.   · Call '911' for an ambulance.   · Treat any wounds.   · Immobilize (keep it from moving) the limb.   · Pad bony prominences.   · Reassess circulation below injury.   ·   If your caregiver has given you a follow-up appointment, it is very important to keep that appointment. This includes any orthopedic referrals, physical therapy, and rehabilitation. Any delay in obtaining necessary care could result in a delay or failure of the bones to heal. Not keeping the appointment could result in a chronic or permanent injury, pain, and disability. If there is any problem keeping the appointment, you must call back to this facility for assistance.   Document Released: 12/22/2005 Document Revised: 03/11/2013 Document Reviewed: 07/24/2009  ExitCare® Patient Information ©2013 [employer].    Infection Control in the Home  If you have an infection or you are taking care of someone who has an infection, it is important to know how to keep the infection from spreading. Follow these guidelines to help stop the spread of infection, and talk to your health care provider.  HOW ARE INFECTIONS SPREAD?  In order for an infection to spread, the following must be present:  · A germ. This may be a virus, bacteria, fungus, or  parasite.  · A place for the germ to live. This may be:  ¨ On or in a person, animal, plant, or food.  ¨ In soil or water.  ¨ On surfaces, such as a door handle.  · A susceptible host. This is a person or animal who does not have resistance (immunity) to the germ.  · A way for the germ to enter the host. This may occur by:  ¨ Direct contact. This may happen by making contact--such as shaking hands or hugging--with an infected person or animal. Some germs can also travel through the air and spread to you if an infected person coughs or sneezes on you or near you.  ¨ Indirect contact. This is when the germ enters the host through contact with an infected object. Examples include eating contaminated food, drinking contaminated water, or touching a contaminated surface with your hands and then touching your face, nose, or mouth soon after that.  HOW CAN I HELP TO PREVENT INFECTION FROM SPREADING?  There are several things that you can do to help prevent infection from spreading.  Hand Washing  It is very important to wash your hands correctly, following these steps:  1. Wet your hands with clean, running water.  2. Apply soap to your hands. Liquid soap is better than bar soap.  3. Rub your hands together quickly to create lather.  4. Keep rubbing your hands together for at least 20 seconds. Thoroughly scrub all parts of your hands, including under your fingernails and between your fingers.  5. Rinse your hands with clean, running water until all of the soap is gone.  6. Dry your hands with an air dryer or a clean paper or cloth towel, or let your hands air-dry. Do not use your clothing or a soiled towel to dry your hands.  7. If you are in a public restroom, use your towel to turn off the water faucet and to open the bathroom door.  Make sure to wash your hands:  · Before:  ¨ Visiting a baby or anyone with a weakened or lowered defense (immune) system.  ¨ Putting in and taking out any contact  lenses.  · After:  ¨ Working or playing outside.  ¨ Touching an animal or its toys or leash.  ¨ Handling livestock.  ¨ Using the bathroom or helping a child or adult to use the bathroom.  ¨ Using household  or toxic chemicals.  ¨ Touching or taking out the garbage.  ¨ Touching anything dirty around your home.  ¨ Handling soiled clothes or rags.  ¨ Taking care of a sick child. This includes touching used tissues, toys, and clothes.  ¨ Sneezing, coughing, or blowing your nose.  ¨ Using public transportation.  ¨ Shaking hands.  ¨ Using a phone, including your mobile phone.  ¨ Touching money.  · Before and after:  ¨ Preparing food.  ¨ Preparing a bottle for a baby.  ¨ Feeding a baby or a young child.  ¨ Eating.  ¨ Visiting or taking care of someone who is sick.  ¨ Changing a diaper.  ¨ Changing a bandage (dressing) or taking care of an injury or wound.  ¨ Giving or taking medicine.  Taking Care of Your Home  · Make sure that you have enough cleaning supplies at all times. These include:  ¨ Disinfectants.  ¨ Reusable cleaning cloths. Wash these after each use.  ¨ Paper towels.  ¨ Utility gloves. Replace your gloves if they are cracked or torn or if they start to peel.  · Use bleach safely. Never mix it with other cleaning products, especially those that contain ammonia. This mixture can create a dangerous gas that may be deadly.  · Take care of your cleaning supplies. Toilet brushes, mops, and sponges can breed germs. Soak them in bleach and water for 5 minutes after each use.  · Do not pour used mop water down the sink. Pour it down the toilet instead.  · Maintain proper ventilation in your home.  · If you have a pet, ensure that your pet stays clean. Do not let people with weak immune systems touch bird droppings, fish tank water, or a litter box.  ¨ If you have a cat, be sure to change the litter every day.  · In the bathroom, make sure you:  ¨ Provide liquid soap.  ¨ Change towels and washcloths frequently.  Avoid sharing towels and washcloths.  ¨ Change toothbrushes often and store them separately in a clean, dry place.  ¨ Disinfect the toilet.  ¨ Clean the tub, shower, and sink with standard cleaning products.    ¨ Mop the floor with a standard .  ¨ Do not share personal items, such as razors, toothbrushes, drinking glasses, deodorant, dean, brushes, towels, and washcloths.    · In the kitchen, make sure you:  ¨ Store food carefully.  ¨ Refrigerate leftovers promptly in covered containers.  ¨ Throw out stale or spoiled food.  ¨ Clean the inside of your refrigerator each week.  ¨ Keep your refrigerator set at 40°F (4°C) or less, and set your freezer at 0°F (-18°C) or less.  ¨ Thaw foods in the refrigerator or microwave, not at room temperature.  ¨ Serve foods at the proper temperature. Do not eat raw meat. Make sure it is cooked to the appropriate temperature. Cook eggs until they are firm.  ¨ Wash fruits and vegetables under running water.  ¨ Use separate cutting boards, plates, and utensils for raw foods and cooked foods.  ¨ Keep work surfaces clean.  ¨ Use a clean spoon each time you sample food while cooking.  ¨ Wash your dishes in hot, soapy water. Air-dry your dishes or use a .  ¨ Do not share forks, cups, or spoons during meals.  · Wear gloves if laundry is visibly soiled.  · Change linens each week or whenever they are soiled.  · Do not shake soiled linens. Doing that may send germs into the air. Put dressings, sanitary or incontinence pads, diapers, and gloves in plastic garbage bags for disposal.     This information is not intended to replace advice given to you by your health care provider. Make sure you discuss any questions you have with your health care provider.     Document Released: 09/26/2009 Document Revised: 01/08/2016 Document Reviewed: 08/20/2015  Elsevier Interactive Patient Education ©2016 [employer]      Pain Medicine Instructions  HOW CAN PAIN MEDICINE AFFECT ME?  You  were prescribed pain medicine. This medicine may:  · Make you tired or sleepy.  · Affect how well you can:  ¨ Drive  ¨ Do certain activities.  Pain medicine may not make all of your pain go away. You should be comfortable enough to:  · Move.  · Breathe.  · Take care of yourself.  HOW OFTEN SHOULD I TAKE PAIN MEDICINE AND HOW MUCH SHOULD I TAKE?  · Take pain medicine only as told by your doctor and only as needed for pain.  · You do not need to take pain medicine if you are not having pain, unless your doctor tells you to do that.  · You can take less than the prescribed dose if you find that less medicine helps your pain.  WHAT SHOULD I AVOID WHILE I AM TAKING PAIN MEDICINE?  Follow these instructions after you start taking pain medicine, while you are taking the medicine, and for 8 hours after you stop taking the medicine:  · Do not drive.  · Do not use machinery.  · Do not use power tools.  · Do not sign legal documents.  · Do not drink alcohol.  · Do not take sleeping pills.  · Do not take care of children by yourself.  · Do not do any activities that involve climbing or being in high places.  · Do not go into any body of water unless there is an adult nearby who can watch and help you. This includes:  ¨ Lakes.  ¨ Rivers.  ¨ Oceans.  ¨ Spas.  ¨ Swimming pools.  HOW CAN I KEEP OTHERS SAFE WHILE I AM TAKING PAIN MEDICINE?  · Store your pain medicine as told by your doctor. Make sure that you keep it where children and pets cannot reach it.  · Do not share your pain medicine with anyone.  · Do not save any leftover pills. If you have any leftover pain medicine, get rid of it or destroy it as told by your doctor.  WHAT ELSE DO I NEED TO KNOW ABOUT TAKING PAIN MEDICINE?  · Use a poop (stool) softener if you have trouble pooping (constipation) because of your pain medicine. Eating more fruits and vegetables also helps with constipation.  · Write down the times when you take your pain medicine. Look at the times before  you take your next dose of medicine.  · If your pain is very bad, do not take more pills than told by your doctor. Call your doctor for help.  · Your pain medicine might have acetaminophen in it. Do not take any other acetaminophen while you are taking this medicine. An overdose of acetaminophen can do very bad damage to your liver. If you are taking any medicines in addition to your pain medicine, check the active ingredients on those medicines to see if acetaminophen is listed.  WHEN SHOULD I CALL MY DOCTOR?  · Your medicine is not helping the pain.  · You do either of these soon after you take the medicine:  ¨ Throw up (vomit).  ¨ Have watery poop (diarrhea).  · You have new pain in areas that did not hurt before.  · You have an allergic reaction to your medicine. This may include:  ¨ Feeling itchy.  ¨ Swelling.  ¨ Feeling dizzy.  ¨ Getting a new rash.  WHEN SHOULD I CALL 911 OR GO TO THE EMERGENCY ROOM?  · You feel dizzy or you faint.  · You feel very confused.  · You throw up again and again.  · Your skin or lips turn pale or bluish in color.  · You are:  ¨ Short of breath.  ¨ Breathing much more slowly than usual.  · You have a very bad allergic reaction to your medicine. This includes:  ¨ Developing a swollen tongue.  ¨ Having trouble breathing.     This information is not intended to replace advice given to you by your health care provider. Make sure you discuss any questions you have with your health care provider.     Document Released: 06/05/2009 Document Revised: 05/03/2016 Document Reviewed: 10/22/2015  [employer] Interactive Patient Education ©2016 [employer]      Walker Use  HOW TO TELL IF A WALKER IS THE RIGHT SIZE  · With your arms hanging at your sides, the walker handles should be at wrist level. If you cannot find the exact fit, choose the height that is most comfortable.  · If you have been instructed to not place weight on one of your legs, you may feel more comfortable with a shorter height.  "If you are using the walker for balance, you may prefer a taller height.  · Adjust the height by using the push buttons on the legs of your walker.  · In rest position, the back leg of the walkers should be no further ahead than your toes. With your hands resting on the , your elbows should be slightly bent at about a 30 degree angle.  · Ask your physical therapist or caregiver if you have any concerns.  HOW TO USE A STANDARD WALKER (NO WHEELS)  · Pick your walker up (do not slide your walker) and place it one step length in front of you. The back legs of the walker should be no further ahead than your toes. You should not feel like you need to lean forward to keep your hands on the . As you set the walker down, make sure all 4 leg tips contact the ground at the same time.  · Hold onto the walker for support and step forward with your weaker leg into the middle of the walker. Follow the weight bearing instructions your caregiver has given you.  · Push down with your hands and step forward with your stronger leg.  · Be careful not to let the walker get too far ahead of you as you walk.  · Repeat the process for each step.  HOW TO USE A FRONT-WHEELED WALKER  · Slide your walker forward. The back legs of the walker should be no further ahead than your toes. You should not feel like you need to lean forward to keep your hands on the .  · Hold onto the walker for support and step forward with your weaker leg into the middle of the walker. Follow the weight bearing instructions your caregiver has given you.  · Push down with your hands and step forward with your stronger leg.  · Be careful not to let the walker get too far ahead of you as you walk.  · Repeat the process for each step.  · If your walker does not glide well over carpet, consider cutting an \"x\" in 2 old tennis balls and placing them over the back legs of your walker.  STANDING UP FROM A CHAIR WITH ARMRESTS  · It is best to sit in a firm chair " with armrests.  · Position your walker directly in front of your chair. Do not pull on the walker when standing up. It is too unstable to support weight when pulled on.  · Slide forward in the chair, with your weaker leg ahead and stronger leg bent near the chair.  · Lean forward and push up from your chair with both hands on the armrests. Straighten your stronger leg, rising to standing. Do not pull yourself up from the walker. This may cause it to tip.  · When you feel steady on your feet, carefully move one hand at a time to the walker.  · Stand for a few seconds to stabilize your balance before you start to walk.  STANDING UP FROM A CHAIR WITHOUT ARMRESTS  · It is best to sit in a firm chair. A low seat or an overstuffed chair or sofa is hard to get out of.  · Place the walker in front of you. Do not pull on the walker when coming to a standing position.  · Slide forward in the chair, with your weaker leg ahead and stronger leg bent near the chair.  · Push down on the chair seat with the hand opposite your weaker leg. Keep your other hand on the center of the walker's crossbar.  · Stand, steady your balance, and place your hands on the walker handgrips.  SITTING DOWN  · Always back up toward your chair, using your walker, until you feel the back of your legs touch the chair.  · If the chair has armrests, carefully reach back to put your hands on the armrests, and slowly lower your weight.  · If the chair does not have armrests, consider backing up to the side of the chair. You can then hold onto the back of the chair and the front of the seat to slowly lower yourself.  · You should never feel like you are falling into your chair.  USING A WALKER ON STEPS  ·  Before attempting to use your walker on steps, practice with your physical therapist.  · If you are going up a step wide enough to accommodate the entire walker and yourself:  ¨ First, place the walker up on the step.  ¨ Second, get your feet as close to the  step as you can.  ¨ Third, press down on the walker with your hands as you step up with your stronger leg. Then step up with your weaker leg.  · If you are going down a step wide enough to accommodate the entire walker and yourself:  ¨ First, place the walker down on the step.  ¨ Second, hold onto the walker as you step down with your weaker leg. Then step down with your stronger leg.  · If you are going up more than 1 step and have a railing:  ¨ First, turn the walker sideways, so the opening is facing in toward you.  ¨ Second, place the front 2 legs of the walker on the first step. These front legs should be positioned at the base of the next step.  ¨ Third, test the steadiness of the walker. It should feel sturdy when you press down on the handgrip that is facing the top of the steps.  ¨ Finally, placing your weight on the railing and the walker, step up with your stronger leg first. Then step up with your weaker leg.  · If you are going down more than 1 step and have a railing:  ¨ First, turn the walker sideways, so the opening is facing in toward you.  ¨ Second, place the front 2 legs of the walker down on the first step. When possible, the back legs of the walker should be positioned at the base of the previous step.  ¨ Third, test the steadiness of the walker. It should feel sturdy when you press down on the handgrip that is facing the top of the steps.  ¨ Finally, placing your weight on the railing and the walker, step down with your weaker leg first. Then step down with your stronger leg.  · Be sure to check the sturdiness of the walker before each step.  · Make sure you have good rubber tips on the legs of your walker to prevent it from slipping.     This information is not intended to replace advice given to you by your health care provider. Make sure you discuss any questions you have with your health care provider.     Document Released: 12/18/2006 Document Revised: 03/11/2013 Document Reviewed:  06/26/2012  [employer] Interactive Patient Education ©2016 [employer]        Depression / Suicide Risk    As you are discharged from this [facility] facility, it is important to learn how to keep safe from harming yourself.    Recognize the warning signs:  · Abrupt changes in personality, positive or negative- including increase in energy   · Giving away possessions  · Change in eating patterns- significant weight changes-  positive or negative  · Change in sleeping patterns- unable to sleep or sleeping all the time   · Unwillingness or inability to communicate  · Depression  · Unusual sadness, discouragement and loneliness  · Talk of wanting to die  · Neglect of personal appearance   · Rebelliousness- reckless behavior  · Withdrawal from people/activities they love  · Confusion- inability to concentrate     If you or a loved one observes any of these behaviors or has concerns about self-harm, here's what you can do:  · Talk about it- your feelings and reasons for harming yourself  · Remove any means that you might use to hurt yourself (examples: pills, rope, extension cords, firearm)  · Get professional help from the community (Mental Health, Substance Abuse, psychological counseling)  · Do not be alone:Call your Safe Contact- someone whom you trust who will be there for you.  · Call your local CRISIS HOTLINE 180-1101 or 309-923-7997  · Call your local Children's Mobile Crisis Response Team [place] (861) 730-1071 or www.[facility]  · Call the toll free National Suicide Prevention Hotlines   · National Suicide Prevention Lifeline 591-131-OZZA (7794)  · National Hope Line Network 800-SUICIDE (449-8511)

## 2017-12-25 NOTE — PROGRESS NOTES
"   Orthopaedic PA Progress Note    Interval changes:C/O pain, mobilizing well, OT note read and appreciated, OK to DC home from Orthopaedic perspective    ROS - Patient denies any new issues. No chest pain, dyspnea, or fever.  Pain well controlled.    Blood pressure 149/74, pulse 99, temperature 36.7 °C (98.1 °F), resp. rate 18, height 1.854 m (6' 1\"), weight 75.1 kg (165 lb 9.1 oz), SpO2 91 %.    Patient seen and examined  No acute distress  Breathing non labored  RRR  Left thigh surgical dressings clean, dry, and intact. Patient clearly fires tibialis anterior, EHL, and gastrocnemius/soleus. Sensation is intact to light touch throughout superficial peroneal, deep peroneal, tibial, saphenous, and sural nerve distributions. Strong and palpable 2+ dorsalis pedis and posterior tibial pulses with capillary refill less than 2 seconds. No lower leg tenderness or discomfort.    Recent Labs      12/23/17   0620   WBC  3.6*   RBC  4.40*   HEMOGLOBIN  12.0*   HEMATOCRIT  37.6*   MCV  85.5   MCH  27.3   MCHC  31.9*   RDW  40.4   PLATELETCT  173   MPV  11.4     Active Hospital Problems    Diagnosis   • Hip fracture ([facility]) [S72.009A]     Assessment/Plan:  POD#2 S/p L Hip nail  Wt bearing status - AT with assist, please mobilize OOB at least TID  PT/OT-initiated  Wound care:drssings CDI, will change prior to discharge  Drains - no  Medley-no  Sutures/Staples out- 10-14 days post operatively  Antibiotics: completed  DVT Prophylaxis- TEDS/SCDs/Foot pumps.   Lovenox: Start 40 mg daily, Duration-until ambulatory > 150'  Future Procedures - not anticipated  Case Coordination for Discharge Planning - Disposition home when cleared by PT/and med, possibly later today or tomorrow       "

## 2017-12-25 NOTE — CARE PLAN
Problem: Bowel/Gastric:  Goal: Normal bowel function is maintained or improved    Intervention: Educate patient and significant other/support system about diet, fluid intake, medications and activity to promote bowel function  Educated patient and encouraged adequate water intake to assist in ensuring bowel movement.

## 2017-12-25 NOTE — DISCHARGE PLANNING
Received choice form from PABLO Chopra at 1235,  Referral sent to [facility] at 1232 on 12-25-17.

## 2017-12-25 NOTE — DISCHARGE PLANNING
SW met with pt at bedside do discuss DME.  Pt signed choice for [facility] for FWW. Pt is familiar with [facility] and has received insulin form them in the past. Pt to obtain Shower chair, raised toilet seat, and grab bars from [facility]. Family to provide transportation to outpatient services. Family is also available to  DME from [facility].     Bedside nurse notified that pt ordered FWW through [facility].

## 2017-12-25 NOTE — PROGRESS NOTES
Discharge to home with friends. Pt signed a copy of the discharge papers and confirms all questions have been answered by the MD or the RN. Second copy of d/c papers in chart. 3 Prescriptions provided to pt. IV discontinued. Pt states all person belongings are in possession. Pt escorted off unit with assistance from the RN staff in a wheelchair without incident. Pt room has been stripped and is ready to be cleaned. No belongings left behind.

## 2017-12-25 NOTE — DISCHARGE SUMMARY
[facility] DISCHARGE SUMMARY     PATIENT ID:    Name:             Colton Prasad   YOB: 1970  Age:                 47 y.o.  male   MRN:               [id number]  Address:         [address] [place] [address]  Phone:            [phone] (home)    ADMISSION DATE: 12/20/2017    DISCHARGE DATE: 12/25/2017    DISCHARGE DIAGNOSES:   No problems updated.    ATTENDING PHYSICIAN: Edmar Kidd MD    RESIDENT: Moraima Velásquez MD    CONSULTANTS:  Orthopedic surgery    PROCEDURES:  Surgical treatment of left intertrochanteric femur fracture with intramedullary device    IMAGING:   Pelvic x-ray (12/20)  Comminuted intertrochanteric fracture of the left femur.    LABS:  Recent Labs      12/23/17   0620   WBC  3.6*   RBC  4.40*   HEMOGLOBIN  12.0*   HEMATOCRIT  37.6*   MCV  85.5   MCH  27.3   RDW  40.4   PLATELETCT  173   MPV  11.4   NEUTSPOLYS  56.50   LYMPHOCYTES  24.80   MONOCYTES  12.00   EOSINOPHILS  5.60   BASOPHILS  0.80     Recent Labs      12/22/17   1443  12/23/17   0620  12/24/17   0034   SODIUM  136  135  134*   POTASSIUM  4.1  4.3  3.9   CHLORIDE  109  107  104   CO2  23  23  25   GLUCOSE  94  226*  182*   BUN  13  8  7*     Lab Results   Component Value Date/Time    CHOLSTRLTOT 140 08/11/2014 09:38 AM    LDL 72 08/11/2014 09:38 AM    HDL 54 08/11/2014 09:38 AM    TRIGLYCERIDE 69 08/11/2014 09:38 AM       Lab Results   Component Value Date/Time    TROPONINI <0.01 12/20/2017 08:01 PM       Lab Results   Component Value Date/Time    TROPONINI <0.01 12/20/2017 08:01 PM          HISTORY OF PRESENT ILLNESS:  Colton  is a 47 y.o. Male with a PMH of DM type 1, IVDA (meth  who was admitted on 12/20/2017 for left hip pain after slipping on ice while carrying packages at ~5pm.  First fell and hit his left hip and then left elbow.  Denies hitting his head, denies LOC. Denies elbow pain.  Denies any lightheadedness, dizziness, or LOC before fall.     Also has a history of DM type 1 with  non-compliance with DM medication.  Sees Suzette Joe for DM care, but admits he does not use his short acting insulin as instructed.    Active [facility]    HOSPITAL COURSE:   Briefly 47 y.o. Male admitted with the above history.  Orthopedic surgery was consulted and patient was eventually taken to the OR for IM repair of left femur fracture.  He tolerated the procedure well.  He was evaluated by PT and OT who gave discharge recommendations for DME.  On the day of discharge, patient was ambulatory with assistance and felt comfortable with discharge home.  He was given prescription for Norco and instructed to follow up with orthopedic surgery in 10 days for staple removal.  He was discharged home on daily aspirin for DVT ppx per orthopedic surgery recommendations.  He was instructed to advance weight bearing on LLE as tolerated.    DISCHARGE CONDITION:  Stable    DISPOSITION: Home     DISCHARGE MEDICATIONS:    Colton Prasad Jr.   Home Medication Instructions SCOTT:[id number]    Printed on:12/25/17 8342   Medication Information                      atorvastatin (LIPITOR) 10 MG Tab  Take 10 mg by mouth every evening.             cyclobenzaprine (FLEXERIL) 10 MG Tab  Take 10 mg by mouth every evening.             gabapentin (NEURONTIN) 300 MG Cap  Take 300 mg by mouth every evening.             Insulin Glargine (TOUJEO SOLOSTAR) 300 UNIT/ML Solution Pen-injector  Inject 36 Units as instructed every evening.             insulin lispro (HUMALOG) 100 UNIT/ML Solution  Inject 1-3 Units as instructed as needed for High Blood Sugar. Pt only takes if blood sugar is >250. 1 unit for every 100 >250             sertraline (ZOLOFT) 50 MG TABS  Take 1 Tab by mouth every day.                  ACTIVITY:  Normal Activity as Tolerated.    DIET: Healthy    DISCHARGE INSTRUCTIONS AND FOLLOW UP:  Patient is medically stable for discharge and will be discharged to home.    Follow Up: Orthopedic surgery as  instructed  PCP in 1-2 weeks    Discharge Instructions:   Patient was instructed to return the ER in the event of worsening symptoms including but not limited to severe swelling or pain in left lower extremity or any other major concerns. Patient understands that failure to do so may indicate worsening of his/her medical condition(s) and result in adverse clinical outcomes including fatality. We have counseled the patient on the importance of compliance and the patient has agreed to proceed with all medical recommendations and follow up plan indicated above. The patient understands that the failure to do so may result in result in adverse clinical outcomes including fatality.     CC:   CAMPBELL LUJAN

## 2017-12-25 NOTE — PROGRESS NOTES
[facility] MEDICINE PROGRESS NOTE     Attending: Edmar Kidd MD    Resident: Moraima Velásquez MD    PATIENT: Colton Prasad; [id number]; 1970    ID: 47 y.o. male admitted for MGLF and left hip fracture, with complicating T1DM, alcoholism and IV drug use.    SUBJECTIVE: No acute events overnight.  Wants to discharge.  Seen by PT and OT who made DME recommendations.      OBJECTIVE:     Vitals:    12/24/17 1200 12/24/17 1600 12/24/17 2000 12/25/17 0345   BP: 119/85 158/80 157/80 149/74   Pulse: (!) 106 (!) 102 100 99   Resp: 16 20 18 18   Temp: 36.9 °C (98.5 °F) 37.1 °C (98.8 °F) 36.9 °C (98.4 °F) 36.7 °C (98.1 °F)   SpO2: 98% 93% 96% 91%   Weight:       Height:           Intake/Output Summary (Last 24 hours) at 12/25/17 0620  Last data filed at 12/25/17 0400   Gross per 24 hour   Intake                0 ml   Output             2201 ml   Net            -2201 ml       PE:  General: No acute distress, afebrile, resting comfortably  HEENT: NC/AT. EOMI. MMM, neck supple without adenopathy or mass  Cardiovascular: RRR, NMGR, cap refill brisk.  Respiratory: CTAB, no tachypnea or retractions  Abdomen: soft, NT/ND, no masses  EXT:  Surgical site in LLE with dressing c/d/i.  Pulses present.    Neuro: Non-focal    LABS:  Recent Labs      12/23/17   0620   WBC  3.6*   RBC  4.40*   HEMOGLOBIN  12.0*   HEMATOCRIT  37.6*   MCV  85.5   MCH  27.3   RDW  40.4   PLATELETCT  173   MPV  11.4   NEUTSPOLYS  56.50   LYMPHOCYTES  24.80   MONOCYTES  12.00   EOSINOPHILS  5.60   BASOPHILS  0.80     Recent Labs      12/22/17   1443  12/23/17   0620  12/24/17   0034   SODIUM  136  135  134*   POTASSIUM  4.1  4.3  3.9   CHLORIDE  109  107  104   CO2  23  23  25   BUN  13  8  7*   CREATININE  0.58  0.64  0.60   CALCIUM  8.4*  8.4*  8.7   MAGNESIUM   --   2.0   --    PHOSPHORUS   --   3.3   --    ALBUMIN  3.2   --    --      Estimated GFR/CRCL = Estimated Creatinine Clearance: 161.7 mL/min (by C-G formula based on SCr of 0.6 mg/dL).  Recent Labs       12/22/17   1443   12/23/17   0620   12/24/17   0034  12/24/17   0645  12/24/17   1136  12/24/17 2103   GLUCOSE  94   --   226*   --   182*   --    --    --    POCGLUCOSE   --    < >   --    < >   --   104*  257*  300*    < > = values in this interval not displayed.     Recent Labs      12/22/17   1443  12/23/17 0620   ASTSGOT  16   --    ALTSGPT  18   --    TBILIRUBIN  0.3   --    ALKPHOSPHAT  50   --    GLOBULIN  2.6   --    INR   --   1.01               Invalid input(s): [id number]  Recent Labs      12/23/17 0620   INR  1.01   APTT  29.9       MEDS:  Current Facility-Administered Medications   Medication Last Dose   • diazepam (VALIUM) tablet 2 mg 2 mg at 12/25/17 0546   • labetalol (NORMODYNE,TRANDATE) injection 10 mg     • insulin glargine (LANTUS) injection 26 Units 26 Units at 12/24/17 2059   • morphine (pf) 4 mg/ml injection 4 mg 4 mg at 12/24/17 1133   • hydrocodone-acetaminophen (NORCO) 5-325 MG per tablet 1-2 Tab 1 Tab at 12/25/17 0546   • enoxaparin (LOVENOX) inj 40 mg Stopped at 12/22/17 0900   • senna-docusate (PERICOLACE or SENOKOT S) 8.6-50 MG per tablet 2 Tab 2 Tab at 12/24/17 2058    And   • polyethylene glycol/lytes (MIRALAX) PACKET 1 Packet      And   • magnesium hydroxide (MILK OF MAGNESIA) suspension 30 mL      And   • bisacodyl (DULCOLAX) suppository 10 mg     • ondansetron (ZOFRAN ODT) dispertab 4 mg 4 mg at 12/24/17 1133   • acetaminophen (TYLENOL) tablet 650 mg     • insulin regular (HUMULIN R) injection 1-6 Units 3 Units at 12/24/17 2105    And   • glucose 4 g chewable tablet 16 g 16 g at 12/22/17 1724    And   • dextrose 50% (D50W) injection 25 mL 25 mL at 12/22/17 0557   • atorvastatin (LIPITOR) tablet 10 mg 10 mg at 12/24/17 2057   • gabapentin (NEURONTIN) capsule 300 mg 300 mg at 12/24/17 2058   • sertraline (ZOLOFT) tablet 50 mg 50 mg at 12/24/17 0902   • ibuprofen (MOTRIN) tablet 600 mg 600 mg at 12/21/17 1040       ASSESSMENT/PLAN: 47 y.o. male admitted for MGLF and left  hip fracture, with complicating T1DM, alcoholism and IV drug use.     # Left intertrochanteric femur fracture   - POD#2  - Norco PRN pain  - APAP and Ibuprofen prn pain.  - Lovenox--can transition to aspirin for outpatient  - Regular diet  - PT/OT post op     # Diabetes Mellitus Type 1  - Hyperglycemia present on admission  - Continue home lantus (on Toujeo at home--equivalent dosing)  - Insulin Sliding Scale - low intensity  - Hypoglycemia protocol  - A1c 10.6     # Hx of Alcoholism  - CIWA protocol--no need for ativan at this point  - SW referral     # Depression  - Continue Sertraline home dosing     # Intravenous Drug Abuse  - Patient last used 1 day prior to admission  - Wants referral to resources and help quitting  - SW Referral       Dispo: Discharge today with DME orders in    # Full Code

## 2017-12-25 NOTE — FACE TO FACE
Face to Face Note  -  Durable Medical Equipment    Moraima Velásquez M.D. - NPI: [id number]  I certify that this patient is under my care and that they had a durable medical equipment(DME)face to face encounter by myself that meets the physician DME face-to-face encounter requirements with this patient on:    Date of encounter:   Patient:                    MRN:                       YOB: 2017  Colton Prasad Jr.  [id number]  1970     The encounter with the patient was in whole, or in part, for the following medical condition, which is the primary reason for durable medical equipment:  Post-Op Surgery    I certify that, based on my findings, the following durable medical equipment is medically necessary:  Walkers and Other DME Equipment - Grab bars, Shower Chair, Raised toilet seat.      My Clinical findings support the need for the above equipment due to:  Other - Post Op femur fracture repair

## 2017-12-26 LAB — GLUCOSE BLD-MCNC: 336 MG/DL (ref 65–99)

## 2018-01-07 ENCOUNTER — APPOINTMENT (OUTPATIENT)
Dept: RADIOLOGY | Facility: MEDICAL CENTER | Age: 48
End: 2018-01-07
Attending: EMERGENCY MEDICINE
Payer: MEDICAID

## 2018-01-07 ENCOUNTER — HOSPITAL ENCOUNTER (EMERGENCY)
Facility: MEDICAL CENTER | Age: 48
End: 2018-01-07
Attending: EMERGENCY MEDICINE
Payer: MEDICAID

## 2018-01-07 VITALS
RESPIRATION RATE: 18 BRPM | HEIGHT: 73 IN | HEART RATE: 103 BPM | WEIGHT: 149 LBS | SYSTOLIC BLOOD PRESSURE: 128 MMHG | BODY MASS INDEX: 19.75 KG/M2 | OXYGEN SATURATION: 97 % | TEMPERATURE: 101.5 F | DIASTOLIC BLOOD PRESSURE: 69 MMHG

## 2018-01-07 DIAGNOSIS — B34.9 VIRAL SYNDROME: ICD-10-CM

## 2018-01-07 DIAGNOSIS — J11.1 INFLUENZA: ICD-10-CM

## 2018-01-07 LAB
ALBUMIN SERPL BCP-MCNC: 3.4 G/DL (ref 3.2–4.9)
ALBUMIN/GLOB SERPL: 1.1 G/DL
ALP SERPL-CCNC: 185 U/L (ref 30–99)
ALT SERPL-CCNC: 21 U/L (ref 2–50)
ANION GAP SERPL CALC-SCNC: 8 MMOL/L (ref 0–11.9)
APPEARANCE UR: CLEAR
AST SERPL-CCNC: 20 U/L (ref 12–45)
BACTERIA #/AREA URNS HPF: NEGATIVE /HPF
BASOPHILS # BLD AUTO: 0.7 % (ref 0–1.8)
BASOPHILS # BLD: 0.08 K/UL (ref 0–0.12)
BILIRUB SERPL-MCNC: 0.7 MG/DL (ref 0.1–1.5)
BILIRUB UR QL STRIP.AUTO: NEGATIVE
BUN SERPL-MCNC: 13 MG/DL (ref 8–22)
CALCIUM SERPL-MCNC: 8.9 MG/DL (ref 8.5–10.5)
CHLORIDE SERPL-SCNC: 98 MMOL/L (ref 96–112)
CO2 SERPL-SCNC: 24 MMOL/L (ref 20–33)
COLOR UR: YELLOW
CREAT SERPL-MCNC: 0.64 MG/DL (ref 0.5–1.4)
EOSINOPHIL # BLD AUTO: 0.21 K/UL (ref 0–0.51)
EOSINOPHIL NFR BLD: 1.8 % (ref 0–6.9)
EPI CELLS #/AREA URNS HPF: NEGATIVE /HPF
ERYTHROCYTE [DISTWIDTH] IN BLOOD BY AUTOMATED COUNT: 44.1 FL (ref 35.9–50)
FLUAV RNA SPEC QL NAA+PROBE: NEGATIVE
FLUBV RNA SPEC QL NAA+PROBE: POSITIVE
GFR SERPL CREATININE-BSD FRML MDRD: >60 ML/MIN/1.73 M 2
GLOBULIN SER CALC-MCNC: 3.2 G/DL (ref 1.9–3.5)
GLUCOSE SERPL-MCNC: 226 MG/DL (ref 65–99)
GLUCOSE UR STRIP.AUTO-MCNC: 100 MG/DL
HCT VFR BLD AUTO: 36.4 % (ref 42–52)
HGB BLD-MCNC: 12 G/DL (ref 14–18)
HYALINE CASTS #/AREA URNS LPF: ABNORMAL /LPF
IMM GRANULOCYTES # BLD AUTO: 0.03 K/UL (ref 0–0.11)
IMM GRANULOCYTES NFR BLD AUTO: 0.3 % (ref 0–0.9)
KETONES UR STRIP.AUTO-MCNC: NEGATIVE MG/DL
LACTATE BLD-SCNC: 2 MMOL/L (ref 0.5–2)
LEUKOCYTE ESTERASE UR QL STRIP.AUTO: NEGATIVE
LYMPHOCYTES # BLD AUTO: 0.43 K/UL (ref 1–4.8)
LYMPHOCYTES NFR BLD: 3.6 % (ref 22–41)
MCH RBC QN AUTO: 27.9 PG (ref 27–33)
MCHC RBC AUTO-ENTMCNC: 33 G/DL (ref 33.7–35.3)
MCV RBC AUTO: 84.7 FL (ref 81.4–97.8)
MICRO URNS: ABNORMAL
MONOCYTES # BLD AUTO: 0.81 K/UL (ref 0–0.85)
MONOCYTES NFR BLD AUTO: 6.8 % (ref 0–13.4)
NEUTROPHILS # BLD AUTO: 10.37 K/UL (ref 1.82–7.42)
NEUTROPHILS NFR BLD: 86.8 % (ref 44–72)
NITRITE UR QL STRIP.AUTO: NEGATIVE
NRBC # BLD AUTO: 0 K/UL
NRBC BLD-RTO: 0 /100 WBC
PH UR STRIP.AUTO: >=9 [PH]
PLATELET # BLD AUTO: 376 K/UL (ref 164–446)
PMV BLD AUTO: 10.1 FL (ref 9–12.9)
POTASSIUM SERPL-SCNC: 3.8 MMOL/L (ref 3.6–5.5)
PROT SERPL-MCNC: 6.6 G/DL (ref 6–8.2)
PROT UR QL STRIP: 100 MG/DL
RBC # BLD AUTO: 4.3 M/UL (ref 4.7–6.1)
RBC # URNS HPF: ABNORMAL /HPF
RBC UR QL AUTO: NEGATIVE
SODIUM SERPL-SCNC: 130 MMOL/L (ref 135–145)
SP GR UR STRIP.AUTO: 1.01
SPERM #/AREA URNS HPF: ABNORMAL /HPF
UROBILINOGEN UR STRIP.AUTO-MCNC: 1 MG/DL
WBC # BLD AUTO: 11.9 K/UL (ref 4.8–10.8)
WBC #/AREA URNS HPF: ABNORMAL /HPF

## 2018-01-07 PROCEDURE — 700102 HCHG RX REV CODE 250 W/ 637 OVERRIDE(OP): Performed by: EMERGENCY MEDICINE

## 2018-01-07 PROCEDURE — 85025 COMPLETE CBC W/AUTO DIFF WBC: CPT

## 2018-01-07 PROCEDURE — A9270 NON-COVERED ITEM OR SERVICE: HCPCS | Performed by: EMERGENCY MEDICINE

## 2018-01-07 PROCEDURE — 99284 EMERGENCY DEPT VISIT MOD MDM: CPT

## 2018-01-07 PROCEDURE — 87040 BLOOD CULTURE FOR BACTERIA: CPT

## 2018-01-07 PROCEDURE — 700105 HCHG RX REV CODE 258: Performed by: EMERGENCY MEDICINE

## 2018-01-07 PROCEDURE — 87086 URINE CULTURE/COLONY COUNT: CPT

## 2018-01-07 PROCEDURE — 71045 X-RAY EXAM CHEST 1 VIEW: CPT

## 2018-01-07 PROCEDURE — 87502 INFLUENZA DNA AMP PROBE: CPT

## 2018-01-07 PROCEDURE — 96374 THER/PROPH/DIAG INJ IV PUSH: CPT

## 2018-01-07 PROCEDURE — 304561 HCHG STAT O2

## 2018-01-07 PROCEDURE — 93005 ELECTROCARDIOGRAM TRACING: CPT | Performed by: EMERGENCY MEDICINE

## 2018-01-07 PROCEDURE — 700111 HCHG RX REV CODE 636 W/ 250 OVERRIDE (IP): Performed by: EMERGENCY MEDICINE

## 2018-01-07 PROCEDURE — 81001 URINALYSIS AUTO W/SCOPE: CPT

## 2018-01-07 PROCEDURE — 96375 TX/PRO/DX INJ NEW DRUG ADDON: CPT

## 2018-01-07 PROCEDURE — 80053 COMPREHEN METABOLIC PANEL: CPT

## 2018-01-07 PROCEDURE — 83605 ASSAY OF LACTIC ACID: CPT

## 2018-01-07 RX ORDER — LOSARTAN POTASSIUM 25 MG/1
25 TABLET ORAL EVERY EVENING
COMMUNITY

## 2018-01-07 RX ORDER — SODIUM CHLORIDE 9 MG/ML
1000 INJECTION, SOLUTION INTRAVENOUS ONCE
Status: COMPLETED | OUTPATIENT
Start: 2018-01-07 | End: 2018-01-07

## 2018-01-07 RX ORDER — ACETAMINOPHEN 325 MG/1
1000 TABLET ORAL ONCE
Status: COMPLETED | OUTPATIENT
Start: 2018-01-07 | End: 2018-01-07

## 2018-01-07 RX ORDER — KETOROLAC TROMETHAMINE 30 MG/ML
30 INJECTION, SOLUTION INTRAMUSCULAR; INTRAVENOUS ONCE
Status: COMPLETED | OUTPATIENT
Start: 2018-01-07 | End: 2018-01-07

## 2018-01-07 RX ORDER — CEFTRIAXONE 2 G/1
2 INJECTION, POWDER, FOR SOLUTION INTRAMUSCULAR; INTRAVENOUS ONCE
Status: COMPLETED | OUTPATIENT
Start: 2018-01-07 | End: 2018-01-07

## 2018-01-07 RX ADMIN — SODIUM CHLORIDE 1000 ML: 9 INJECTION, SOLUTION INTRAVENOUS at 20:18

## 2018-01-07 RX ADMIN — CEFTRIAXONE SODIUM 2 G: 2 INJECTION, POWDER, FOR SOLUTION INTRAMUSCULAR; INTRAVENOUS at 17:30

## 2018-01-07 RX ADMIN — ACETAMINOPHEN 975 MG: 325 TABLET, FILM COATED ORAL at 17:30

## 2018-01-07 RX ADMIN — KETOROLAC TROMETHAMINE 30 MG: 30 INJECTION, SOLUTION INTRAMUSCULAR at 20:15

## 2018-01-07 ASSESSMENT — PAIN SCALES - GENERAL: PAINLEVEL_OUTOF10: 6

## 2018-01-08 LAB — EKG IMPRESSION: NORMAL

## 2018-01-08 NOTE — ED PROVIDER NOTES
ED Provider Note    CHIEF COMPLAINT  Chief Complaint   Patient presents with   • Cough   • Post-op Problem   • Fever       HPI  Colton Prasad Jr. is a 47 y.o. male who presentsFor evaluation of cough and fever that began yesterday. The patient describes some chest pain when he coughs, his cough has been occasionally productive. No nasal congestion or rhinorrhea. He has a headache, no neck pain or stiffness, no weakness, numbness or tingling. He is approximately 3 weeks status post surgery for hip fracture, he describes ongoing but not worsening pain in the left leg as well as continuing redness on his left leg distally that is unchanged for the past couple weeks. Has a history of diabetes and intravenous methamphetamine use    REVIEW OF SYSTEMS  Negative for rash, abdominal pain, vomiting, diarrhea, focal weakness, focal numbness, focal tingling, back pain. All other systems are negative.     PAST MEDICAL HISTORY  Past Medical History:   Diagnosis Date   • Diabetes    • IVDA (Intravenous Drug Abus]        FAMILY HISTORY  Family History   Problem Relation Age of Onset   • Lung Disease Neg Hx    • Cancer Neg Hx    • Diabetes Neg Hx    • Heart Disease Neg Hx    • Stroke Neg Hx        SOCIAL HISTORY  Social History   Substance Use Topics   • Smoking status: Former Smoker   • Smokeless tobacco: Current User     Types: Chew      Comment: quit >20 years ago. 14 pack years hx   • Alcohol use No      Comment: occassional       SURGICAL HISTORY  Past Surgical History:   Procedure Laterality Date   • HIP NAILING INTRAMEDULLARY Left 12/23/2017    Procedure: HIP NAILING INTRAMEDULLARY;  Surgeon: Brody King M.D.;  Location: SURGERY [facility];  Service: Orthopedics       CURRENT MEDICATIONS  I personally reviewed the medication list in the charting documentation.     ALLERGIES  Allergies   Allergen Reactions   • Nkda [No Known Drug Allergy]        MEDICAL RECORD  I have reviewed patient's medical record and  "pertinent results are listed above.      PHYSICAL EXAM  VITAL SIGNS: /69   Pulse (!) 115   Temp (!) 38.6 °C (101.5 °F)   Resp 18   Ht 1.854 m (6' 1\")   Wt 67.6 kg (149 lb)   SpO2 93%   BMI 19.66 kg/m²    Constitutional:Acutely ill-appearing.  HENT: Mucus membranes dry.    Eyes: No scleral icterus. Normal conjunctiva   Neck: Supple, comfortable, nonpainful range of motion.   Cardiovascular: Tachycardic and regular.   Thorax & Lungs: Bibasilar crackles, frequent cough, no wheezing  Abdomen: Soft, with no tenderness, rebound nor guarding.  No mass or pulsatile mass appreciated.  Skin: Warm, dry. No rash appreciated  Extremities/Musculoskeletal: A few surgical incisions in the left leg laterally that are clean, dry, nonerythematous and intact. Distally he does have some redness that is asymmetric but no calf tenderness, no obvious asymmetric swelling. Neurovascularly intact.  Neurologic: Alert & oriented. No focal deficits observed.   Psychiatric: Normal affect appropriate for the clinical situation.    DIAGNOSTIC STUDIES / PROCEDURES    LABS  Results for orders placed or performed during the hospital encounter of 01/07/18   Lactic acid (lactate)   Result Value Ref Range    Lactic Acid 2.0 0.5 - 2.0 mmol/L   CBC WITH DIFFERENTIAL   Result Value Ref Range    WBC 11.9 (H) 4.8 - 10.8 K/uL    RBC 4.30 (L) 4.70 - 6.10 M/uL    Hemoglobin 12.0 (L) 14.0 - 18.0 g/dL    Hematocrit 36.4 (L) 42.0 - 52.0 %    MCV 84.7 81.4 - 97.8 fL    MCH 27.9 27.0 - 33.0 pg    MCHC 33.0 (L) 33.7 - 35.3 g/dL    RDW 44.1 35.9 - 50.0 fL    Platelet Count 376 164 - 446 K/uL    MPV 10.1 9.0 - 12.9 fL    Neutrophils-Polys 86.80 (H) 44.00 - 72.00 %    Lymphocytes 3.60 (L) 22.00 - 41.00 %    Monocytes 6.80 0.00 - 13.40 %    Eosinophils 1.80 0.00 - 6.90 %    Basophils 0.70 0.00 - 1.80 %    Immature Granulocytes 0.30 0.00 - 0.90 %    Nucleated RBC 0.00 /100 WBC    Neutrophils (Absolute) 10.37 (H) 1.82 - 7.42 K/uL    Lymphs (Absolute) 0.43 (L) " 1.00 - 4.80 K/uL    Monos (Absolute) 0.81 0.00 - 0.85 K/uL    Eos (Absolute) 0.21 0.00 - 0.51 K/uL    Baso (Absolute) 0.08 0.00 - 0.12 K/uL    Immature Granulocytes (abs) 0.03 0.00 - 0.11 K/uL    NRBC (Absolute) 0.00 K/uL   COMP METABOLIC PANEL   Result Value Ref Range    Sodium 130 (L) 135 - 145 mmol/L    Potassium 3.8 3.6 - 5.5 mmol/L    Chloride 98 96 - 112 mmol/L    Co2 24 20 - 33 mmol/L    Anion Gap 8.0 0.0 - 11.9    Glucose 226 (H) 65 - 99 mg/dL    Bun 13 8 - 22 mg/dL    Creatinine 0.64 0.50 - 1.40 mg/dL    Calcium 8.9 8.5 - 10.5 mg/dL    AST(SGOT) 20 12 - 45 U/L    ALT(SGPT) 21 2 - 50 U/L    Alkaline Phosphatase 185 (H) 30 - 99 U/L    Total Bilirubin 0.7 0.1 - 1.5 mg/dL    Albumin 3.4 3.2 - 4.9 g/dL    Total Protein 6.6 6.0 - 8.2 g/dL    Globulin 3.2 1.9 - 3.5 g/dL    A-G Ratio 1.1 g/dL   URINALYSIS   Result Value Ref Range    Color Yellow     Character Clear     Specific Gravity 1.015 <1.035    Ph >=9.0 (A) 5.0 - 8.0    Glucose 100 (A) Negative mg/dL    Ketones Negative Negative mg/dL    Protein 100 (A) Negative mg/dL    Bilirubin Negative Negative    Urobilinogen, Urine 1.0 Negative    Nitrite Negative Negative    Leukocyte Esterase Negative Negative    Occult Blood Negative Negative    Micro Urine Req Microscopic    ESTIMATED GFR   Result Value Ref Range    GFR If African American >60 >60 mL/min/1.73 m 2    GFR If Non African American >60 >60 mL/min/1.73 m 2   INFLUENZA A/B BY PCR   Result Value Ref Range    Influenza virus A RNA Negative Negative    Influenza virus B, PCR POSITIVE (A) Negative   URINE MICROSCOPIC (W/UA)   Result Value Ref Range    WBC 2-5 (A) /hpf    RBC 0-2 (A) /hpf    Bacteria Negative None /hpf    Epithelial Cells Negative /hpf    Hyaline Cast 0-2 /lpf    Sperm Moderate /hpf   EKG (ER)   Result Value Ref Range    Report       [facility] Emergency Dept.    Test Date:  2018-01-07  Pt Name:    OLIVIER BAJWA              Department: ER  MRN:        [id number]                       Room:       [facility]  Gender:     Male                         Technician: [id number]  :        1970                   Requested By:MYLA AGUILERA  Order #:    [id number]                    Reading MD:    Measurements  Intervals                                Axis  Rate:       106                          P:          62  WA:         144                          QRS:        -6  QRSD:       78                           T:          69  QT:         332  QTc:        441    Interpretive Statements  SINUS TACHYCARDIA  LOW VOLTAGE IN FRONTAL LEADS  Compared to ECG 2008 08:24:36  Low QRS voltage now present  Atrial abnormality no longer present           RADIOLOGY  DX-CHEST-PORTABLE (1 VIEW)   Final Result      No acute cardiopulmonary findings.            COURSE & MEDICAL DECISION MAKING  I have reviewed any medical record information, laboratory studies and radiographic results as noted above.  Differential diagnoses includes: Sepsis, pneumonia, influenza, cellulitis, dehydration, electrolytes abnormalities, endocarditis     Encounter Summary: This is a 47 y.o. male with cough and fever, he appears ill on exam, he is febrile and tachycardic and hypoxic requiring supplemental oxygen, currently on 3 L of supplement oxygen via nasal cannula. He is postop from a hip surgery but the surgical incision looks good. There is an area redness distally involving his foot which could be cellulitis but he states it's been there for a couple weeks. I do suspect either influenza or pneumonia. Another possibility is endocarditis based on his intravenous drug use. In any event he will be immediately administered broad-spectrum antibiotics as he hits multiple markers for sepsis. He is quite ill-appearing. He will be administered some IV fluids. This patient is at a high chance of acute decompensation here in emergency department and will be watched closely and we will be the highest level preparing this for emergency  interventions should the need arise ------ evaluation here is consistent with influenza with a positive influenza B swab, the patient states he feels better, he was administered IV fluids, his heart rate remained somewhat elevated so he'll receive a 2nd liter of IV fluids. Otherwise he is feeling better, he does not have pneumonia, I think the implant explains his symptoms well enough that otherwise excluding endocarditis and cellulitis is not necessary, patient is going be discharged home in stable condition with strict return instructions.    CRITICAL CARE  The very real possibilty of a deterioration of this patient's condition required the highest level of my preparedness for sudden, emergent intervention.  I provided critical care services, which included medication orders, frequent reevaluations of the patient's condition and response to treatment, ordering and reviewing test results, and discussing the case with various consultants.  The critical care time associated with the care of the patient was 36 minutes. Review chart for interventions. This time is exclusive of any other billable procedures.         DISPOSITION: Discharge home in improved and stable condition      FINAL IMPRESSION  1. Influenza    2. Viral syndrome           This dictation was created using voice recognition software. The accuracy of the dictation is limited to the abilities of the software. I expect there may be some errors of grammar and possibly content. The nursing notes were reviewed and certain aspects of this information were incorporated into this note.    Electronically signed by: Ariel Huddleston, 1/7/2018 5:43 PM

## 2018-01-08 NOTE — ED NOTES
".  Chief Complaint   Patient presents with   • Cough   • Post-op Problem   • Fever   Pt BIB EMS from home.  S/P left hip surgery at [facility] 12/21/2017.  Pt c/o cough and fever beginning last evening. Pt states \" sharp pain in my chest when I cough.\"  Pt reports difficulty breathing at this time.  No signs of infection at surgical sites LLE.  + LLE distal CMS. FSBS .  Pt received  ml and Zofran 4mg IVP PTA.  Pt has history of IVDA - states \" last used on the 19th. \"     "

## 2018-01-08 NOTE — ED NOTES
Pt Given discharge instructions/ home care instructions, Pt verbalized understanding of instructions given, pt ambulatory to KASSANDRA sadler.

## 2018-01-08 NOTE — DISCHARGE INSTRUCTIONS
"Influenza, Adult  Influenza (\"the flu\") is a viral infection of the respiratory tract. It occurs more often in winter months because people spend more time in close contact with one another. Influenza can make you feel very sick. Influenza easily spreads from person to person (contagious).  CAUSES   Influenza is caused by a virus that infects the respiratory tract. You can catch the virus by breathing in droplets from an infected person's cough or sneeze. You can also catch the virus by touching something that was recently contaminated with the virus and then touching your mouth, nose, or eyes.  RISKS AND COMPLICATIONS  You may be at risk for a more severe case of influenza if you smoke cigarettes, have diabetes, have chronic heart disease (such as heart failure) or lung disease (such as asthma), or if you have a weakened immune system. Elderly people and pregnant women are also at risk for more serious infections. The most common problem of influenza is a lung infection (pneumonia). Sometimes, this problem can require emergency medical care and may be life threatening.  SIGNS AND SYMPTOMS   Symptoms typically last 4 to 10 days and may include:  · Fever.  · Chills.  · Headache, body aches, and muscle aches.  · Sore throat.  · Chest discomfort and cough.  · Poor appetite.  · Weakness or feeling tired.  · Dizziness.  · Nausea or vomiting.  DIAGNOSIS   Diagnosis of influenza is often made based on your history and a physical exam. A nose or throat swab test can be done to confirm the diagnosis.  TREATMENT   In mild cases, influenza goes away on its own. Treatment is directed at relieving symptoms. For more severe cases, your health care provider may prescribe antiviral medicines to shorten the sickness. Antibiotic medicines are not effective because the infection is caused by a virus, not by bacteria.  HOME CARE INSTRUCTIONS  · Take medicines only as directed by your health care provider.  · Use a cool mist humidifier " to make breathing easier.  · Get plenty of rest until your temperature returns to normal. This usually takes 3 to 4 days.  · Drink enough fluid to keep your urine clear or pale yellow.  · Cover your mouth and nose when coughing or sneezing, and wash your hands well to prevent the virus from spreading.  · Stay home from work or school until the fever is gone for at least 1 full day.  PREVENTION   An annual influenza vaccination (flu shot) is the best way to avoid getting influenza. An annual flu shot is now routinely recommended for all adults in the [place]  SEEK MEDICAL CARE IF:  · You experience chest pain, your cough worsens, or you produce more mucus.  · You have nausea, vomiting, or diarrhea.  · Your fever returns or gets worse.  SEEK IMMEDIATE MEDICAL CARE IF:  · You have trouble breathing, you become short of breath, or your skin or nails become bluish.  · You have severe pain or stiffness in the neck.  · You develop a sudden headache, or pain in the face or ear.  · You have nausea or vomiting that you cannot control.  MAKE SURE YOU:   · Understand these instructions.  · Will watch your condition.  · Will get help right away if you are not doing well or get worse.     This information is not intended to replace advice given to you by your health care provider. Make sure you discuss any questions you have with your health care provider.     Document Released: 12/15/2001 Document Revised: 01/08/2016 Document Reviewed: 03/18/2013  [employer] Interactive Patient Education ©2016 [employer]

## 2018-01-09 LAB
BACTERIA UR CULT: NORMAL
SIGNIFICANT IND 70042: NORMAL
SITE SITE: NORMAL
SOURCE SOURCE: NORMAL

## 2018-01-12 LAB
BACTERIA BLD CULT: NORMAL
SIGNIFICANT IND 70042: NORMAL
SITE SITE: NORMAL
SOURCE SOURCE: NORMAL

## 2018-12-23 PROCEDURE — 0QS736Z REPOSITION LEFT UPPER FEMUR WITH INTRAMEDULLARY INTERNAL FIXATION DEVICE, PERCUTANEOUS APPROACH: ICD-10-PCS | Performed by: ORTHOPAEDIC SURGERY

## 2021-03-02 ENCOUNTER — HOSPITAL ENCOUNTER (EMERGENCY)
Dept: HOSPITAL 8 - ED | Age: 51
Discharge: HOME | End: 2021-03-02
Payer: MEDICAID

## 2021-03-02 VITALS — DIASTOLIC BLOOD PRESSURE: 90 MMHG | SYSTOLIC BLOOD PRESSURE: 149 MMHG

## 2021-03-02 VITALS — HEIGHT: 73 IN | BODY MASS INDEX: 26.73 KG/M2 | WEIGHT: 201.72 LBS

## 2021-03-02 DIAGNOSIS — M94.0: Primary | ICD-10-CM

## 2021-03-02 DIAGNOSIS — E11.9: ICD-10-CM

## 2021-03-02 DIAGNOSIS — R07.89: ICD-10-CM

## 2021-03-02 PROCEDURE — 99283 EMERGENCY DEPT VISIT LOW MDM: CPT

## 2021-03-02 PROCEDURE — 71101 X-RAY EXAM UNILAT RIBS/CHEST: CPT

## 2021-03-02 PROCEDURE — 96372 THER/PROPH/DIAG INJ SC/IM: CPT

## 2021-03-02 NOTE — NUR
LATE ENTRY DUE TO PATIENT CARE. PT REPORTS WALKING BACK HOME FROM [facility]. HAS 
UNSTEADY GAIT WHICH LEAD TO A GLF. FELL ONTO HIS RIGHT SIDE AND STATES IT HURTS 
WHEN HE TAKES A DEEP BREATH IN AND OUT.

## 2022-01-10 ENCOUNTER — TELEPHONE (OUTPATIENT)
Dept: ENDOCRINOLOGY | Facility: MEDICAL CENTER | Age: 52
End: 2022-01-10

## 2023-03-16 ENCOUNTER — APPOINTMENT (OUTPATIENT)
Dept: URGENT CARE | Facility: PHYSICIAN GROUP | Age: 53
End: 2023-03-16
Payer: MEDICAID

## 2024-03-20 ENCOUNTER — HOSPITAL ENCOUNTER (OUTPATIENT)
Dept: RADIOLOGY | Facility: MEDICAL CENTER | Age: 54
End: 2024-03-20
Attending: NURSE PRACTITIONER
Payer: MEDICAID

## 2024-03-20 DIAGNOSIS — R60.0 LOWER EXTREMITY EDEMA: ICD-10-CM

## 2024-12-24 NOTE — PROGRESS NOTES
"Seen and examined, doing OK today.  Ready for surgery.    Blood pressure (!) 94/62, pulse 88, temperature 37.3 °C (99.1 °F), resp. rate 16, height 1.854 m (6' 1\"), weight 75.1 kg (165 lb 9.1 oz), SpO2 95 %.    Exam: Skin left hip OK, exam unchanged    #1 Left proximal femur fracture    - To OR today for L femur IM nail  - NPO  - WBAT post-op  " Dr Lucille hernandez

## 2025-03-02 ENCOUNTER — APPOINTMENT (OUTPATIENT)
Dept: RADIOLOGY | Facility: MEDICAL CENTER | Age: 55
DRG: 637 | End: 2025-03-02
Attending: STUDENT IN AN ORGANIZED HEALTH CARE EDUCATION/TRAINING PROGRAM
Payer: MEDICAID

## 2025-03-02 ENCOUNTER — APPOINTMENT (OUTPATIENT)
Dept: RADIOLOGY | Facility: MEDICAL CENTER | Age: 55
DRG: 637 | End: 2025-03-02
Attending: INTERNAL MEDICINE
Payer: MEDICAID

## 2025-03-02 ENCOUNTER — HOSPITAL ENCOUNTER (INPATIENT)
Facility: MEDICAL CENTER | Age: 55
LOS: 3 days | DRG: 637 | End: 2025-03-05
Attending: STUDENT IN AN ORGANIZED HEALTH CARE EDUCATION/TRAINING PROGRAM | Admitting: INTERNAL MEDICINE
Payer: MEDICAID

## 2025-03-02 DIAGNOSIS — R41.82 ALTERED MENTAL STATUS, UNSPECIFIED ALTERED MENTAL STATUS TYPE: ICD-10-CM

## 2025-03-02 DIAGNOSIS — E87.5 HYPERKALEMIA: ICD-10-CM

## 2025-03-02 DIAGNOSIS — E11.10 DIABETIC KETOACIDOSIS WITHOUT COMA ASSOCIATED WITH TYPE 2 DIABETES MELLITUS (HCC): ICD-10-CM

## 2025-03-02 DIAGNOSIS — N17.9 ACUTE KIDNEY INJURY (HCC): ICD-10-CM

## 2025-03-02 PROBLEM — R79.89 PSEUDOHYPONATREMIA: Status: ACTIVE | Noted: 2025-03-02

## 2025-03-02 PROBLEM — L81.9 MOTTLED SKIN: Status: ACTIVE | Noted: 2025-03-02

## 2025-03-02 PROBLEM — R74.01 TRANSAMINITIS: Status: ACTIVE | Noted: 2025-03-02

## 2025-03-02 PROBLEM — E10.10 DKA, TYPE 1, NOT AT GOAL (HCC): Status: ACTIVE | Noted: 2025-03-02

## 2025-03-02 PROBLEM — G93.41 ACUTE METABOLIC ENCEPHALOPATHY: Status: ACTIVE | Noted: 2025-03-02

## 2025-03-02 LAB
ALBUMIN SERPL BCP-MCNC: 3.1 G/DL (ref 3.2–4.9)
ALBUMIN SERPL BCP-MCNC: 3.2 G/DL (ref 3.2–4.9)
ALBUMIN/GLOB SERPL: 1.1 G/DL
ALBUMIN/GLOB SERPL: 1.1 G/DL
ALP SERPL-CCNC: 269 U/L (ref 30–99)
ALP SERPL-CCNC: 277 U/L (ref 30–99)
ALT SERPL-CCNC: 167 U/L (ref 2–50)
ALT SERPL-CCNC: 177 U/L (ref 2–50)
AMMONIA PLAS-SCNC: 51 UMOL/L (ref 11–45)
ANION GAP SERPL CALC-SCNC: 41 MMOL/L (ref 7–16)
ANION GAP SERPL CALC-SCNC: 41 MMOL/L (ref 7–16)
AST SERPL-CCNC: 102 U/L (ref 12–45)
AST SERPL-CCNC: 89 U/L (ref 12–45)
B-OH-BUTYR SERPL-MCNC: >9 MMOL/L (ref 0.02–0.27)
BASE EXCESS BLDV CALC-SCNC: -28 MMOL/L (ref -2–3)
BASO STIPL BLD QL SMEAR: NORMAL
BASOPHILS # BLD AUTO: 0 % (ref 0–1.8)
BASOPHILS # BLD AUTO: 0.3 % (ref 0–1.8)
BASOPHILS # BLD: 0 K/UL (ref 0–0.12)
BASOPHILS # BLD: 0.07 K/UL (ref 0–0.12)
BILIRUB SERPL-MCNC: <0.2 MG/DL (ref 0.1–1.5)
BILIRUB SERPL-MCNC: <0.2 MG/DL (ref 0.1–1.5)
BODY TEMPERATURE: 36.3 CENTIGRADE
BUN SERPL-MCNC: 48 MG/DL (ref 8–22)
BUN SERPL-MCNC: 49 MG/DL (ref 8–22)
BURR CELLS BLD QL SMEAR: NORMAL
CALCIUM ALBUM COR SERPL-MCNC: 8.7 MG/DL (ref 8.5–10.5)
CALCIUM ALBUM COR SERPL-MCNC: 8.9 MG/DL (ref 8.5–10.5)
CALCIUM SERPL-MCNC: 8.1 MG/DL (ref 8.5–10.5)
CALCIUM SERPL-MCNC: 8.2 MG/DL (ref 8.5–10.5)
CHLORIDE SERPL-SCNC: 77 MMOL/L (ref 96–112)
CHLORIDE SERPL-SCNC: 80 MMOL/L (ref 96–112)
CK SERPL-CCNC: 188 U/L (ref 0–154)
CO2 SERPL-SCNC: 2 MMOL/L (ref 20–33)
CO2 SERPL-SCNC: 3 MMOL/L (ref 20–33)
CREAT SERPL-MCNC: 1.46 MG/DL (ref 0.5–1.4)
CREAT SERPL-MCNC: 1.55 MG/DL (ref 0.5–1.4)
EOSINOPHIL # BLD AUTO: 0 K/UL (ref 0–0.51)
EOSINOPHIL # BLD AUTO: 0.03 K/UL (ref 0–0.51)
EOSINOPHIL NFR BLD: 0 % (ref 0–6.9)
EOSINOPHIL NFR BLD: 0.1 % (ref 0–6.9)
ERYTHROCYTE [DISTWIDTH] IN BLOOD BY AUTOMATED COUNT: 45.1 FL (ref 35.9–50)
ERYTHROCYTE [DISTWIDTH] IN BLOOD BY AUTOMATED COUNT: 45.7 FL (ref 35.9–50)
EST. AVERAGE GLUCOSE BLD GHB EST-MCNC: 392 MG/DL
GFR SERPLBLD CREATININE-BSD FMLA CKD-EPI: 53 ML/MIN/1.73 M 2
GFR SERPLBLD CREATININE-BSD FMLA CKD-EPI: 56 ML/MIN/1.73 M 2
GLOBULIN SER CALC-MCNC: 2.8 G/DL (ref 1.9–3.5)
GLOBULIN SER CALC-MCNC: 3 G/DL (ref 1.9–3.5)
GLUCOSE BLD STRIP.AUTO-MCNC: 487 MG/DL (ref 65–99)
GLUCOSE BLD STRIP.AUTO-MCNC: 521 MG/DL (ref 65–99)
GLUCOSE BLD STRIP.AUTO-MCNC: 572 MG/DL (ref 65–99)
GLUCOSE BLD STRIP.AUTO-MCNC: >600 MG/DL (ref 65–99)
GLUCOSE SERPL-MCNC: 584 MG/DL (ref 65–99)
GLUCOSE SERPL-MCNC: 647 MG/DL (ref 65–99)
GLUCOSE SERPL-MCNC: 710 MG/DL (ref 65–99)
HAV IGM SERPL QL IA: NORMAL
HBA1C MFR BLD: 15.3 % (ref 4–5.6)
HBV CORE IGM SER QL: NORMAL
HBV SURFACE AG SER QL: NORMAL
HCO3 BLDV-SCNC: 3 MMOL/L (ref 22–29)
HCT VFR BLD AUTO: 36.5 % (ref 42–52)
HCT VFR BLD AUTO: 36.9 % (ref 42–52)
HCV AB SER QL: NORMAL
HGB BLD-MCNC: 10.9 G/DL (ref 14–18)
HGB BLD-MCNC: 11 G/DL (ref 14–18)
IMM GRANULOCYTES # BLD AUTO: 0.26 K/UL (ref 0–0.11)
IMM GRANULOCYTES NFR BLD AUTO: 1.3 % (ref 0–0.9)
LACTATE SERPL-SCNC: 3.3 MMOL/L (ref 0.5–2)
LYMPHOCYTES # BLD AUTO: 0.64 K/UL (ref 1–4.8)
LYMPHOCYTES # BLD AUTO: 1.5 K/UL (ref 1–4.8)
LYMPHOCYTES NFR BLD: 3.4 % (ref 22–41)
LYMPHOCYTES NFR BLD: 7.2 % (ref 22–41)
MAGNESIUM SERPL-MCNC: 2.2 MG/DL (ref 1.5–2.5)
MANUAL DIFF BLD: NORMAL
MCH RBC QN AUTO: 27.8 PG (ref 27–33)
MCH RBC QN AUTO: 27.9 PG (ref 27–33)
MCHC RBC AUTO-ENTMCNC: 29.8 G/DL (ref 32.3–36.5)
MCHC RBC AUTO-ENTMCNC: 29.9 G/DL (ref 32.3–36.5)
MCV RBC AUTO: 93.2 FL (ref 81.4–97.8)
MCV RBC AUTO: 93.6 FL (ref 81.4–97.8)
METAMYELOCYTES NFR BLD MANUAL: 0.9 %
MONOCYTES # BLD AUTO: 1.46 K/UL (ref 0–0.85)
MONOCYTES # BLD AUTO: 2.36 K/UL (ref 0–0.85)
MONOCYTES NFR BLD AUTO: 11.4 % (ref 0–13.4)
MONOCYTES NFR BLD AUTO: 7.8 % (ref 0–13.4)
MORPHOLOGY BLD-IMP: NORMAL
NEUTROPHILS # BLD AUTO: 16.44 K/UL (ref 1.82–7.42)
NEUTROPHILS # BLD AUTO: 16.56 K/UL (ref 1.82–7.42)
NEUTROPHILS NFR BLD: 79.7 % (ref 44–72)
NEUTROPHILS NFR BLD: 87.9 % (ref 44–72)
NRBC # BLD AUTO: 0 K/UL
NRBC # BLD AUTO: 0 K/UL
NRBC BLD-RTO: 0 /100 WBC (ref 0–0.2)
NRBC BLD-RTO: 0 /100 WBC (ref 0–0.2)
NT-PROBNP SERPL IA-MCNC: 1921 PG/ML (ref 0–125)
PCO2 BLDV: 16.9 MMHG (ref 38–54)
PCO2 TEMP ADJ BLDV: 16.4 MMHG (ref 38–54)
PH BLDV: 6.92 [PH] (ref 7.31–7.45)
PH TEMP ADJ BLDV: 6.93 [PH] (ref 7.31–7.45)
PHOSPHATE SERPL-MCNC: 7.2 MG/DL (ref 2.5–4.5)
PLATELET # BLD AUTO: 306 K/UL (ref 164–446)
PLATELET # BLD AUTO: 322 K/UL (ref 164–446)
PLATELET BLD QL SMEAR: NORMAL
PMV BLD AUTO: 11 FL (ref 9–12.9)
PMV BLD AUTO: 11 FL (ref 9–12.9)
PO2 BLDV: 62.2 MMHG (ref 23–48)
PO2 TEMP ADJ BLDV: 59.3 MMHG (ref 23–48)
POIKILOCYTOSIS BLD QL SMEAR: NORMAL
POTASSIUM SERPL-SCNC: 6 MMOL/L (ref 3.6–5.5)
POTASSIUM SERPL-SCNC: 7.3 MMOL/L (ref 3.6–5.5)
PROT SERPL-MCNC: 5.9 G/DL (ref 6–8.2)
PROT SERPL-MCNC: 6.2 G/DL (ref 6–8.2)
RBC # BLD AUTO: 3.9 M/UL (ref 4.7–6.1)
RBC # BLD AUTO: 3.96 M/UL (ref 4.7–6.1)
RBC BLD AUTO: PRESENT
SAO2 % BLDV: 78.4 % (ref 60–85)
SODIUM SERPL-SCNC: 120 MMOL/L (ref 135–145)
SODIUM SERPL-SCNC: 124 MMOL/L (ref 135–145)
TSH SERPL DL<=0.005 MIU/L-ACNC: 2.22 UIU/ML (ref 0.38–5.33)
WBC # BLD AUTO: 18.7 K/UL (ref 4.8–10.8)
WBC # BLD AUTO: 20.8 K/UL (ref 4.8–10.8)

## 2025-03-02 PROCEDURE — 93005 ELECTROCARDIOGRAM TRACING: CPT | Mod: TC | Performed by: STUDENT IN AN ORGANIZED HEALTH CARE EDUCATION/TRAINING PROGRAM

## 2025-03-02 PROCEDURE — 80053 COMPREHEN METABOLIC PANEL: CPT

## 2025-03-02 PROCEDURE — 99291 CRITICAL CARE FIRST HOUR: CPT

## 2025-03-02 PROCEDURE — 83880 ASSAY OF NATRIURETIC PEPTIDE: CPT

## 2025-03-02 PROCEDURE — 80048 BASIC METABOLIC PNL TOTAL CA: CPT

## 2025-03-02 PROCEDURE — 96365 THER/PROPH/DIAG IV INF INIT: CPT

## 2025-03-02 PROCEDURE — 99291 CRITICAL CARE FIRST HOUR: CPT | Performed by: INTERNAL MEDICINE

## 2025-03-02 PROCEDURE — 770022 HCHG ROOM/CARE - ICU (200)

## 2025-03-02 PROCEDURE — 99292 CRITICAL CARE ADDL 30 MIN: CPT | Performed by: INTERNAL MEDICINE

## 2025-03-02 PROCEDURE — 83036 HEMOGLOBIN GLYCOSYLATED A1C: CPT

## 2025-03-02 PROCEDURE — 85025 COMPLETE CBC W/AUTO DIFF WBC: CPT

## 2025-03-02 PROCEDURE — 700117 HCHG RX CONTRAST REV CODE 255: Performed by: INTERNAL MEDICINE

## 2025-03-02 PROCEDURE — 96375 TX/PRO/DX INJ NEW DRUG ADDON: CPT

## 2025-03-02 PROCEDURE — 82962 GLUCOSE BLOOD TEST: CPT | Mod: 91

## 2025-03-02 PROCEDURE — 85007 BL SMEAR W/DIFF WBC COUNT: CPT

## 2025-03-02 PROCEDURE — 73706 CT ANGIO LWR EXTR W/O&W/DYE: CPT | Mod: RT

## 2025-03-02 PROCEDURE — 84100 ASSAY OF PHOSPHORUS: CPT

## 2025-03-02 PROCEDURE — 87040 BLOOD CULTURE FOR BACTERIA: CPT | Mod: 91

## 2025-03-02 PROCEDURE — 83735 ASSAY OF MAGNESIUM: CPT

## 2025-03-02 PROCEDURE — 700105 HCHG RX REV CODE 258: Mod: UD | Performed by: STUDENT IN AN ORGANIZED HEALTH CARE EDUCATION/TRAINING PROGRAM

## 2025-03-02 PROCEDURE — 82803 BLOOD GASES ANY COMBINATION: CPT

## 2025-03-02 PROCEDURE — 80074 ACUTE HEPATITIS PANEL: CPT

## 2025-03-02 PROCEDURE — 83605 ASSAY OF LACTIC ACID: CPT

## 2025-03-02 PROCEDURE — 84443 ASSAY THYROID STIM HORMONE: CPT

## 2025-03-02 PROCEDURE — 700105 HCHG RX REV CODE 258: Performed by: INTERNAL MEDICINE

## 2025-03-02 PROCEDURE — 74177 CT ABD & PELVIS W/CONTRAST: CPT

## 2025-03-02 PROCEDURE — 36415 COLL VENOUS BLD VENIPUNCTURE: CPT

## 2025-03-02 PROCEDURE — 85027 COMPLETE CBC AUTOMATED: CPT

## 2025-03-02 PROCEDURE — 96376 TX/PRO/DX INJ SAME DRUG ADON: CPT

## 2025-03-02 PROCEDURE — 82140 ASSAY OF AMMONIA: CPT

## 2025-03-02 PROCEDURE — 93005 ELECTROCARDIOGRAM TRACING: CPT | Mod: TC

## 2025-03-02 PROCEDURE — 700111 HCHG RX REV CODE 636 W/ 250 OVERRIDE (IP): Performed by: STUDENT IN AN ORGANIZED HEALTH CARE EDUCATION/TRAINING PROGRAM

## 2025-03-02 PROCEDURE — 71045 X-RAY EXAM CHEST 1 VIEW: CPT

## 2025-03-02 PROCEDURE — 82010 KETONE BODYS QUAN: CPT

## 2025-03-02 PROCEDURE — 82550 ASSAY OF CK (CPK): CPT

## 2025-03-02 PROCEDURE — 96368 THER/DIAG CONCURRENT INF: CPT

## 2025-03-02 PROCEDURE — 700111 HCHG RX REV CODE 636 W/ 250 OVERRIDE (IP): Performed by: INTERNAL MEDICINE

## 2025-03-02 PROCEDURE — 700111 HCHG RX REV CODE 636 W/ 250 OVERRIDE (IP): Mod: JZ | Performed by: NURSE PRACTITIONER

## 2025-03-02 PROCEDURE — 700102 HCHG RX REV CODE 250 W/ 637 OVERRIDE(OP): Performed by: INTERNAL MEDICINE

## 2025-03-02 PROCEDURE — 73706 CT ANGIO LWR EXTR W/O&W/DYE: CPT | Mod: LT

## 2025-03-02 PROCEDURE — 82947 ASSAY GLUCOSE BLOOD QUANT: CPT

## 2025-03-02 RX ORDER — HEPARIN SODIUM 5000 [USP'U]/ML
5000 INJECTION, SOLUTION INTRAVENOUS; SUBCUTANEOUS EVERY 8 HOURS
Status: DISCONTINUED | OUTPATIENT
Start: 2025-03-02 | End: 2025-03-03

## 2025-03-02 RX ORDER — SODIUM CHLORIDE, SODIUM LACTATE, POTASSIUM CHLORIDE, CALCIUM CHLORIDE 600; 310; 30; 20 MG/100ML; MG/100ML; MG/100ML; MG/100ML
INJECTION, SOLUTION INTRAVENOUS CONTINUOUS
Status: DISCONTINUED | OUTPATIENT
Start: 2025-03-02 | End: 2025-03-03

## 2025-03-02 RX ORDER — MAGNESIUM SULFATE HEPTAHYDRATE 40 MG/ML
4 INJECTION, SOLUTION INTRAVENOUS
Status: COMPLETED | OUTPATIENT
Start: 2025-03-02 | End: 2025-03-03

## 2025-03-02 RX ORDER — SODIUM CHLORIDE, SODIUM LACTATE, POTASSIUM CHLORIDE, AND CALCIUM CHLORIDE .6; .31; .03; .02 G/100ML; G/100ML; G/100ML; G/100ML
2000 INJECTION, SOLUTION INTRAVENOUS ONCE
Status: COMPLETED | OUTPATIENT
Start: 2025-03-02 | End: 2025-03-02

## 2025-03-02 RX ORDER — INDOMETHACIN 25 MG/1
50 CAPSULE ORAL ONCE
Status: COMPLETED | OUTPATIENT
Start: 2025-03-02 | End: 2025-03-02

## 2025-03-02 RX ORDER — MAGNESIUM SULFATE HEPTAHYDRATE 40 MG/ML
2 INJECTION, SOLUTION INTRAVENOUS
Status: COMPLETED | OUTPATIENT
Start: 2025-03-02 | End: 2025-03-03

## 2025-03-02 RX ORDER — ONDANSETRON 2 MG/ML
4 INJECTION INTRAMUSCULAR; INTRAVENOUS ONCE
Status: COMPLETED | OUTPATIENT
Start: 2025-03-02 | End: 2025-03-02

## 2025-03-02 RX ORDER — SODIUM CHLORIDE, SODIUM LACTATE, POTASSIUM CHLORIDE, AND CALCIUM CHLORIDE .6; .31; .03; .02 G/100ML; G/100ML; G/100ML; G/100ML
1000 INJECTION, SOLUTION INTRAVENOUS ONCE
Status: COMPLETED | OUTPATIENT
Start: 2025-03-02 | End: 2025-03-03

## 2025-03-02 RX ORDER — CALCIUM GLUCONATE 20 MG/ML
2 INJECTION, SOLUTION INTRAVENOUS ONCE
Status: COMPLETED | OUTPATIENT
Start: 2025-03-02 | End: 2025-03-02

## 2025-03-02 RX ORDER — DEXTROSE AND SODIUM CHLORIDE 10; .45 G/100ML; G/100ML
INJECTION, SOLUTION INTRAVENOUS CONTINUOUS
Status: ACTIVE | OUTPATIENT
Start: 2025-03-02 | End: 2025-03-03

## 2025-03-02 RX ORDER — SODIUM CHLORIDE, SODIUM LACTATE, POTASSIUM CHLORIDE, CALCIUM CHLORIDE 600; 310; 30; 20 MG/100ML; MG/100ML; MG/100ML; MG/100ML
1000 INJECTION, SOLUTION INTRAVENOUS ONCE
Status: COMPLETED | OUTPATIENT
Start: 2025-03-02 | End: 2025-03-02

## 2025-03-02 RX ORDER — ONDANSETRON 2 MG/ML
4 INJECTION INTRAMUSCULAR; INTRAVENOUS EVERY 6 HOURS PRN
Status: DISCONTINUED | OUTPATIENT
Start: 2025-03-02 | End: 2025-03-05 | Stop reason: HOSPADM

## 2025-03-02 RX ADMIN — IOHEXOL 100 ML: 350 INJECTION, SOLUTION INTRAVENOUS at 22:15

## 2025-03-02 RX ADMIN — HEPARIN SODIUM 5000 UNITS: 5000 INJECTION, SOLUTION INTRAVENOUS; SUBCUTANEOUS at 22:29

## 2025-03-02 RX ADMIN — SODIUM CHLORIDE, POTASSIUM CHLORIDE, SODIUM LACTATE AND CALCIUM CHLORIDE 1000 ML: 600; 310; 30; 20 INJECTION, SOLUTION INTRAVENOUS at 19:26

## 2025-03-02 RX ADMIN — SODIUM CHLORIDE, POTASSIUM CHLORIDE, SODIUM LACTATE AND CALCIUM CHLORIDE 2000 ML: 600; 310; 30; 20 INJECTION, SOLUTION INTRAVENOUS at 22:26

## 2025-03-02 RX ADMIN — SODIUM CHLORIDE, POTASSIUM CHLORIDE, SODIUM LACTATE AND CALCIUM CHLORIDE: 600; 310; 30; 20 INJECTION, SOLUTION INTRAVENOUS at 22:26

## 2025-03-02 RX ADMIN — SODIUM BICARBONATE 50 MEQ: 84 INJECTION INTRAVENOUS at 20:18

## 2025-03-02 RX ADMIN — SODIUM CHLORIDE 7 UNITS/HR: 9 INJECTION, SOLUTION INTRAVENOUS at 20:56

## 2025-03-02 RX ADMIN — INSULIN HUMAN 10 UNITS: 100 INJECTION, SOLUTION PARENTERAL at 20:16

## 2025-03-02 RX ADMIN — SODIUM CHLORIDE, POTASSIUM CHLORIDE, SODIUM LACTATE AND CALCIUM CHLORIDE 1000 ML: 600; 310; 30; 20 INJECTION, SOLUTION INTRAVENOUS at 20:51

## 2025-03-02 RX ADMIN — SODIUM CHLORIDE, POTASSIUM CHLORIDE, SODIUM LACTATE AND CALCIUM CHLORIDE 1000 ML: 600; 310; 30; 20 INJECTION, SOLUTION INTRAVENOUS at 19:58

## 2025-03-02 RX ADMIN — ONDANSETRON 4 MG: 2 INJECTION INTRAMUSCULAR; INTRAVENOUS at 21:38

## 2025-03-02 RX ADMIN — ONDANSETRON 4 MG: 2 INJECTION INTRAMUSCULAR; INTRAVENOUS at 19:26

## 2025-03-02 RX ADMIN — CALCIUM GLUCONATE 2 G: 20 INJECTION, SOLUTION INTRAVENOUS at 20:23

## 2025-03-02 SDOH — ECONOMIC STABILITY: TRANSPORTATION INSECURITY
IN THE PAST 12 MONTHS, HAS THE LACK OF TRANSPORTATION KEPT YOU FROM MEDICAL APPOINTMENTS OR FROM GETTING MEDICATIONS?: PATIENT DECLINED

## 2025-03-02 SDOH — ECONOMIC STABILITY: TRANSPORTATION INSECURITY
IN THE PAST 12 MONTHS, HAS LACK OF RELIABLE TRANSPORTATION KEPT YOU FROM MEDICAL APPOINTMENTS, MEETINGS, WORK OR FROM GETTING THINGS NEEDED FOR DAILY LIVING?: PATIENT DECLINED

## 2025-03-02 ASSESSMENT — COGNITIVE AND FUNCTIONAL STATUS - GENERAL
MOBILITY SCORE: 20
CLIMB 3 TO 5 STEPS WITH RAILING: A LITTLE
MOVING TO AND FROM BED TO CHAIR: A LITTLE
MOVING FROM LYING ON BACK TO SITTING ON SIDE OF FLAT BED: A LITTLE
SUGGESTED CMS G CODE MODIFIER DAILY ACTIVITY: CH
SUGGESTED CMS G CODE MODIFIER MOBILITY: CJ
WALKING IN HOSPITAL ROOM: A LITTLE
DAILY ACTIVITIY SCORE: 24

## 2025-03-02 ASSESSMENT — PATIENT HEALTH QUESTIONNAIRE - PHQ9
7. TROUBLE CONCENTRATING ON THINGS, SUCH AS READING THE NEWSPAPER OR WATCHING TELEVISION: NOT AT ALL
4. FEELING TIRED OR HAVING LITTLE ENERGY: SEVERAL DAYS
6. FEELING BAD ABOUT YOURSELF - OR THAT YOU ARE A FAILURE OR HAVE LET YOURSELF OR YOUR FAMILY DOWN: SEVERAL DAYS
9. THOUGHTS THAT YOU WOULD BE BETTER OFF DEAD, OR OF HURTING YOURSELF: NOT AT ALL
SUM OF ALL RESPONSES TO PHQ9 QUESTIONS 1 AND 2: 3
8. MOVING OR SPEAKING SO SLOWLY THAT OTHER PEOPLE COULD HAVE NOTICED. OR THE OPPOSITE, BEING SO FIGETY OR RESTLESS THAT YOU HAVE BEEN MOVING AROUND A LOT MORE THAN USUAL: NOT AT ALL
SUM OF ALL RESPONSES TO PHQ QUESTIONS 1-9: 5
3. TROUBLE FALLING OR STAYING ASLEEP OR SLEEPING TOO MUCH: NOT AT ALL
1. LITTLE INTEREST OR PLEASURE IN DOING THINGS: MORE THAN HALF THE DAYS
5. POOR APPETITE OR OVEREATING: NOT AT ALL
2. FEELING DOWN, DEPRESSED, IRRITABLE, OR HOPELESS: SEVERAL DAYS

## 2025-03-02 ASSESSMENT — SOCIAL DETERMINANTS OF HEALTH (SDOH)
WITHIN THE PAST 12 MONTHS, THE FOOD YOU BOUGHT JUST DIDN'T LAST AND YOU DIDN'T HAVE MONEY TO GET MORE: PATIENT DECLINED
WITHIN THE PAST 12 MONTHS, YOU WORRIED THAT YOUR FOOD WOULD RUN OUT BEFORE YOU GOT THE MONEY TO BUY MORE: PATIENT DECLINED
WITHIN THE LAST YEAR, HAVE TO BEEN RAPED OR FORCED TO HAVE ANY KIND OF SEXUAL ACTIVITY BY YOUR PARTNER OR EX-PARTNER?: NO
WITHIN THE PAST 12 MONTHS, YOU WORRIED THAT YOUR FOOD WOULD RUN OUT BEFORE YOU GOT THE MONEY TO BUY MORE: PATIENT DECLINED
WITHIN THE LAST YEAR, HAVE YOU BEEN AFRAID OF YOUR PARTNER OR EX-PARTNER?: NO
WITHIN THE LAST YEAR, HAVE YOU BEEN KICKED, HIT, SLAPPED, OR OTHERWISE PHYSICALLY HURT BY YOUR PARTNER OR EX-PARTNER?: NO
WITHIN THE LAST YEAR, HAVE YOU BEEN HUMILIATED OR EMOTIONALLY ABUSED IN OTHER WAYS BY YOUR PARTNER OR EX-PARTNER?: NO
IN THE PAST 12 MONTHS, HAS THE ELECTRIC, GAS, OIL, OR WATER COMPANY THREATENED TO SHUT OFF SERVICE IN YOUR HOME?: PATIENT DECLINED
IN THE PAST 12 MONTHS, HAS THE ELECTRIC, GAS, OIL, OR WATER COMPANY THREATENED TO SHUT OFF SERVICE IN YOUR HOME?: PATIENT DECLINED
WITHIN THE PAST 12 MONTHS, THE FOOD YOU BOUGHT JUST DIDN'T LAST AND YOU DIDN'T HAVE MONEY TO GET MORE: PATIENT DECLINED

## 2025-03-02 ASSESSMENT — ENCOUNTER SYMPTOMS
CHILLS: 1
VOMITING: 0
WEAKNESS: 1
ABDOMINAL PAIN: 0
SHORTNESS OF BREATH: 0
FEVER: 0
NAUSEA: 1

## 2025-03-02 ASSESSMENT — LIFESTYLE VARIABLES
HAVE YOU EVER FELT YOU SHOULD CUT DOWN ON YOUR DRINKING: NO
TOTAL SCORE: 0
AVERAGE NUMBER OF DAYS PER WEEK YOU HAVE A DRINK CONTAINING ALCOHOL: 2
DOES PATIENT WANT TO STOP DRINKING: NO
EVER HAD A DRINK FIRST THING IN THE MORNING TO STEADY YOUR NERVES TO GET RID OF A HANGOVER: NO
TOTAL SCORE: 0
EVER FELT BAD OR GUILTY ABOUT YOUR DRINKING: NO
ON A TYPICAL DAY WHEN YOU DRINK ALCOHOL HOW MANY DRINKS DO YOU HAVE: 4
ALCOHOL_USE: YES
CONSUMPTION TOTAL: NEGATIVE
TOTAL SCORE: 0
HAVE PEOPLE ANNOYED YOU BY CRITICIZING YOUR DRINKING: NO
HOW MANY TIMES IN THE PAST YEAR HAVE YOU HAD 5 OR MORE DRINKS IN A DAY: 0

## 2025-03-02 ASSESSMENT — PAIN DESCRIPTION - PAIN TYPE: TYPE: ACUTE PAIN

## 2025-03-02 ASSESSMENT — FIBROSIS 4 INDEX: FIB4 SCORE: 1.35

## 2025-03-03 ENCOUNTER — APPOINTMENT (OUTPATIENT)
Dept: RADIOLOGY | Facility: MEDICAL CENTER | Age: 55
DRG: 637 | End: 2025-03-03
Attending: NURSE PRACTITIONER
Payer: MEDICAID

## 2025-03-03 PROBLEM — F19.10 SUBSTANCE ABUSE (HCC): Status: ACTIVE | Noted: 2025-03-03

## 2025-03-03 PROBLEM — L81.9 MOTTLED SKIN: Status: RESOLVED | Noted: 2025-03-02 | Resolved: 2025-03-03

## 2025-03-03 PROBLEM — Z91.199 NONCOMPLIANCE: Status: ACTIVE | Noted: 2025-03-03

## 2025-03-03 PROBLEM — R60.0 LOWER EXTREMITY EDEMA: Status: ACTIVE | Noted: 2025-03-03

## 2025-03-03 PROBLEM — I99.8 VASCULAR INSUFFICIENCY OF EXTREMITY: Status: ACTIVE | Noted: 2025-03-03

## 2025-03-03 PROBLEM — R79.89 PSEUDOHYPONATREMIA: Status: RESOLVED | Noted: 2025-03-02 | Resolved: 2025-03-03

## 2025-03-03 LAB
ALBUMIN SERPL BCP-MCNC: 2.8 G/DL (ref 3.2–4.9)
ALP SERPL-CCNC: 213 U/L (ref 30–99)
ALT SERPL-CCNC: 136 U/L (ref 2–50)
AMPHET UR QL SCN: POSITIVE
ANION GAP SERPL CALC-SCNC: 10 MMOL/L (ref 7–16)
ANION GAP SERPL CALC-SCNC: 15 MMOL/L (ref 7–16)
ANION GAP SERPL CALC-SCNC: 23 MMOL/L (ref 7–16)
ANION GAP SERPL CALC-SCNC: 38 MMOL/L (ref 7–16)
APAP SERPL-MCNC: <5 UG/ML (ref 10–30)
APPEARANCE UR: CLEAR
AST SERPL-CCNC: 66 U/L (ref 12–45)
BACTERIA #/AREA URNS HPF: NORMAL /HPF
BARBITURATES UR QL SCN: NEGATIVE
BASOPHILS # BLD AUTO: 0.5 % (ref 0–1.8)
BASOPHILS # BLD: 0.08 K/UL (ref 0–0.12)
BENZODIAZ UR QL SCN: NEGATIVE
BILIRUB CONJ SERPL-MCNC: <0.2 MG/DL (ref 0.1–0.5)
BILIRUB INDIRECT SERPL-MCNC: ABNORMAL MG/DL (ref 0–1)
BILIRUB SERPL-MCNC: 0.2 MG/DL (ref 0.1–1.5)
BILIRUB UR QL STRIP.AUTO: NEGATIVE
BUN SERPL-MCNC: 34 MG/DL (ref 8–22)
BUN SERPL-MCNC: 39 MG/DL (ref 8–22)
BUN SERPL-MCNC: 41 MG/DL (ref 8–22)
BUN SERPL-MCNC: 46 MG/DL (ref 8–22)
BZE UR QL SCN: NEGATIVE
CALCIUM SERPL-MCNC: 7.9 MG/DL (ref 8.5–10.5)
CALCIUM SERPL-MCNC: 8.1 MG/DL (ref 8.5–10.5)
CALCIUM SERPL-MCNC: 8.1 MG/DL (ref 8.5–10.5)
CALCIUM SERPL-MCNC: 8.3 MG/DL (ref 8.5–10.5)
CANNABINOIDS UR QL SCN: NEGATIVE
CASTS URNS QL MICRO: NORMAL /LPF (ref 0–2)
CHLORIDE SERPL-SCNC: 100 MMOL/L (ref 96–112)
CHLORIDE SERPL-SCNC: 84 MMOL/L (ref 96–112)
CHLORIDE SERPL-SCNC: 94 MMOL/L (ref 96–112)
CHLORIDE SERPL-SCNC: 98 MMOL/L (ref 96–112)
CO2 SERPL-SCNC: 14 MMOL/L (ref 20–33)
CO2 SERPL-SCNC: 21 MMOL/L (ref 20–33)
CO2 SERPL-SCNC: 25 MMOL/L (ref 20–33)
CO2 SERPL-SCNC: 5 MMOL/L (ref 20–33)
COLOR UR: YELLOW
CREAT SERPL-MCNC: 0.95 MG/DL (ref 0.5–1.4)
CREAT SERPL-MCNC: 1.18 MG/DL (ref 0.5–1.4)
CREAT SERPL-MCNC: 1.21 MG/DL (ref 0.5–1.4)
CREAT SERPL-MCNC: 1.44 MG/DL (ref 0.5–1.4)
EKG IMPRESSION: NORMAL
EOSINOPHIL # BLD AUTO: 0.03 K/UL (ref 0–0.51)
EOSINOPHIL NFR BLD: 0.2 % (ref 0–6.9)
EPITHELIAL CELLS 1715: NORMAL /HPF (ref 0–5)
ERYTHROCYTE [DISTWIDTH] IN BLOOD BY AUTOMATED COUNT: 43.3 FL (ref 35.9–50)
FENTANYL UR QL: NEGATIVE
FLUAV RNA SPEC QL NAA+PROBE: NEGATIVE
FLUBV RNA SPEC QL NAA+PROBE: NEGATIVE
GFR SERPLBLD CREATININE-BSD FMLA CKD-EPI: 57 ML/MIN/1.73 M 2
GFR SERPLBLD CREATININE-BSD FMLA CKD-EPI: 71 ML/MIN/1.73 M 2
GFR SERPLBLD CREATININE-BSD FMLA CKD-EPI: 73 ML/MIN/1.73 M 2
GFR SERPLBLD CREATININE-BSD FMLA CKD-EPI: 95 ML/MIN/1.73 M 2
GLUCOSE BLD STRIP.AUTO-MCNC: 104 MG/DL (ref 65–99)
GLUCOSE BLD STRIP.AUTO-MCNC: 143 MG/DL (ref 65–99)
GLUCOSE BLD STRIP.AUTO-MCNC: 150 MG/DL (ref 65–99)
GLUCOSE BLD STRIP.AUTO-MCNC: 160 MG/DL (ref 65–99)
GLUCOSE BLD STRIP.AUTO-MCNC: 181 MG/DL (ref 65–99)
GLUCOSE BLD STRIP.AUTO-MCNC: 186 MG/DL (ref 65–99)
GLUCOSE BLD STRIP.AUTO-MCNC: 191 MG/DL (ref 65–99)
GLUCOSE BLD STRIP.AUTO-MCNC: 206 MG/DL (ref 65–99)
GLUCOSE BLD STRIP.AUTO-MCNC: 220 MG/DL (ref 65–99)
GLUCOSE BLD STRIP.AUTO-MCNC: 225 MG/DL (ref 65–99)
GLUCOSE BLD STRIP.AUTO-MCNC: 29 MG/DL (ref 65–99)
GLUCOSE BLD STRIP.AUTO-MCNC: 306 MG/DL (ref 65–99)
GLUCOSE BLD STRIP.AUTO-MCNC: 309 MG/DL (ref 65–99)
GLUCOSE BLD STRIP.AUTO-MCNC: 369 MG/DL (ref 65–99)
GLUCOSE BLD STRIP.AUTO-MCNC: 393 MG/DL (ref 65–99)
GLUCOSE SERPL-MCNC: 115 MG/DL (ref 65–99)
GLUCOSE SERPL-MCNC: 206 MG/DL (ref 65–99)
GLUCOSE SERPL-MCNC: 249 MG/DL (ref 65–99)
GLUCOSE SERPL-MCNC: 443 MG/DL (ref 65–99)
GLUCOSE UR STRIP.AUTO-MCNC: >=1000 MG/DL
HCT VFR BLD AUTO: 34.3 % (ref 42–52)
HGB BLD-MCNC: 10.8 G/DL (ref 14–18)
IMM GRANULOCYTES # BLD AUTO: 0.24 K/UL (ref 0–0.11)
IMM GRANULOCYTES NFR BLD AUTO: 1.4 % (ref 0–0.9)
INR PPP: 1.03 (ref 0.87–1.13)
KETONES UR STRIP.AUTO-MCNC: 80 MG/DL
LEUKOCYTE ESTERASE UR QL STRIP.AUTO: NEGATIVE
LYMPHOCYTES # BLD AUTO: 1.37 K/UL (ref 1–4.8)
LYMPHOCYTES NFR BLD: 8 % (ref 22–41)
MAGNESIUM SERPL-MCNC: 1.8 MG/DL (ref 1.5–2.5)
MAGNESIUM SERPL-MCNC: 2.1 MG/DL (ref 1.5–2.5)
MCH RBC QN AUTO: 28.6 PG (ref 27–33)
MCHC RBC AUTO-ENTMCNC: 31.5 G/DL (ref 32.3–36.5)
MCV RBC AUTO: 90.7 FL (ref 81.4–97.8)
METHADONE UR QL SCN: NEGATIVE
MICRO URNS: ABNORMAL
MONOCYTES # BLD AUTO: 1.55 K/UL (ref 0–0.85)
MONOCYTES NFR BLD AUTO: 9.1 % (ref 0–13.4)
NEUTROPHILS # BLD AUTO: 13.77 K/UL (ref 1.82–7.42)
NEUTROPHILS NFR BLD: 80.8 % (ref 44–72)
NITRITE UR QL STRIP.AUTO: NEGATIVE
NRBC # BLD AUTO: 0 K/UL
NRBC BLD-RTO: 0 /100 WBC (ref 0–0.2)
OPIATES UR QL SCN: NEGATIVE
OXYCODONE UR QL SCN: NEGATIVE
PCP UR QL SCN: NEGATIVE
PH UR STRIP.AUTO: 5 [PH] (ref 5–8)
PHOSPHATE SERPL-MCNC: 1.8 MG/DL (ref 2.5–4.5)
PHOSPHATE SERPL-MCNC: 2.7 MG/DL (ref 2.5–4.5)
PLATELET # BLD AUTO: 260 K/UL (ref 164–446)
PMV BLD AUTO: 10.4 FL (ref 9–12.9)
POTASSIUM SERPL-SCNC: 4.2 MMOL/L (ref 3.6–5.5)
POTASSIUM SERPL-SCNC: 4.5 MMOL/L (ref 3.6–5.5)
POTASSIUM SERPL-SCNC: 4.7 MMOL/L (ref 3.6–5.5)
POTASSIUM SERPL-SCNC: 5.3 MMOL/L (ref 3.6–5.5)
PROCALCITONIN SERPL-MCNC: 2.68 NG/ML
PROPOXYPH UR QL SCN: NEGATIVE
PROT SERPL-MCNC: 5.3 G/DL (ref 6–8.2)
PROT UR QL STRIP: 30 MG/DL
PROTHROMBIN TIME: 13.5 SEC (ref 12–14.6)
RBC # BLD AUTO: 3.78 M/UL (ref 4.7–6.1)
RBC # URNS HPF: NORMAL /HPF (ref 0–2)
RBC UR QL AUTO: NEGATIVE
RSV RNA SPEC QL NAA+PROBE: NEGATIVE
SARS-COV-2 RNA RESP QL NAA+PROBE: NOTDETECTED
SODIUM SERPL-SCNC: 127 MMOL/L (ref 135–145)
SODIUM SERPL-SCNC: 131 MMOL/L (ref 135–145)
SODIUM SERPL-SCNC: 134 MMOL/L (ref 135–145)
SODIUM SERPL-SCNC: 135 MMOL/L (ref 135–145)
SP GR UR STRIP.AUTO: 1.01
SPECIMEN SOURCE: NORMAL
UROBILINOGEN UR STRIP.AUTO-MCNC: 0.2 EU/DL
WBC # BLD AUTO: 17 K/UL (ref 4.8–10.8)
WBC #/AREA URNS HPF: NORMAL /HPF

## 2025-03-03 PROCEDURE — 99291 CRITICAL CARE FIRST HOUR: CPT | Performed by: EMERGENCY MEDICINE

## 2025-03-03 PROCEDURE — 0241U HCHG SARS-COV-2 COVID-19 NFCT DS RESP RNA 4 TRGT MIC: CPT

## 2025-03-03 PROCEDURE — 93922 UPR/L XTREMITY ART 2 LEVELS: CPT

## 2025-03-03 PROCEDURE — 700102 HCHG RX REV CODE 250 W/ 637 OVERRIDE(OP): Performed by: HOSPITALIST

## 2025-03-03 PROCEDURE — 83735 ASSAY OF MAGNESIUM: CPT

## 2025-03-03 PROCEDURE — 80076 HEPATIC FUNCTION PANEL: CPT

## 2025-03-03 PROCEDURE — 81001 URINALYSIS AUTO W/SCOPE: CPT

## 2025-03-03 PROCEDURE — 700105 HCHG RX REV CODE 258: Performed by: INTERNAL MEDICINE

## 2025-03-03 PROCEDURE — A9270 NON-COVERED ITEM OR SERVICE: HCPCS | Performed by: HOSPITALIST

## 2025-03-03 PROCEDURE — 99254 IP/OBS CNSLTJ NEW/EST MOD 60: CPT | Performed by: HOSPITALIST

## 2025-03-03 PROCEDURE — 700102 HCHG RX REV CODE 250 W/ 637 OVERRIDE(OP): Performed by: INTERNAL MEDICINE

## 2025-03-03 PROCEDURE — 770006 HCHG ROOM/CARE - MED/SURG/GYN SEMI*

## 2025-03-03 PROCEDURE — 700102 HCHG RX REV CODE 250 W/ 637 OVERRIDE(OP): Performed by: EMERGENCY MEDICINE

## 2025-03-03 PROCEDURE — 80048 BASIC METABOLIC PNL TOTAL CA: CPT

## 2025-03-03 PROCEDURE — 85610 PROTHROMBIN TIME: CPT

## 2025-03-03 PROCEDURE — 700111 HCHG RX REV CODE 636 W/ 250 OVERRIDE (IP): Mod: JZ | Performed by: NURSE PRACTITIONER

## 2025-03-03 PROCEDURE — 80143 DRUG ASSAY ACETAMINOPHEN: CPT

## 2025-03-03 PROCEDURE — 82962 GLUCOSE BLOOD TEST: CPT | Mod: 91

## 2025-03-03 PROCEDURE — 84100 ASSAY OF PHOSPHORUS: CPT

## 2025-03-03 PROCEDURE — 84145 PROCALCITONIN (PCT): CPT

## 2025-03-03 PROCEDURE — 700105 HCHG RX REV CODE 258: Performed by: EMERGENCY MEDICINE

## 2025-03-03 PROCEDURE — 80307 DRUG TEST PRSMV CHEM ANLYZR: CPT

## 2025-03-03 PROCEDURE — 700105 HCHG RX REV CODE 258: Performed by: HOSPITALIST

## 2025-03-03 PROCEDURE — 700111 HCHG RX REV CODE 636 W/ 250 OVERRIDE (IP): Performed by: EMERGENCY MEDICINE

## 2025-03-03 PROCEDURE — 700111 HCHG RX REV CODE 636 W/ 250 OVERRIDE (IP): Performed by: INTERNAL MEDICINE

## 2025-03-03 PROCEDURE — 700101 HCHG RX REV CODE 250: Performed by: EMERGENCY MEDICINE

## 2025-03-03 PROCEDURE — 97602 WOUND(S) CARE NON-SELECTIVE: CPT

## 2025-03-03 RX ORDER — DEXTROSE MONOHYDRATE 25 G/50ML
25 INJECTION, SOLUTION INTRAVENOUS
Status: DISCONTINUED | OUTPATIENT
Start: 2025-03-03 | End: 2025-03-05 | Stop reason: HOSPADM

## 2025-03-03 RX ORDER — POTASSIUM CHLORIDE 7.45 MG/ML
10 INJECTION INTRAVENOUS ONCE
Status: COMPLETED | OUTPATIENT
Start: 2025-03-03 | End: 2025-03-03

## 2025-03-03 RX ORDER — DEXTROSE MONOHYDRATE 25 G/50ML
25 INJECTION, SOLUTION INTRAVENOUS
Status: DISCONTINUED | OUTPATIENT
Start: 2025-03-03 | End: 2025-03-04

## 2025-03-03 RX ORDER — SODIUM CHLORIDE, SODIUM LACTATE, POTASSIUM CHLORIDE, CALCIUM CHLORIDE 600; 310; 30; 20 MG/100ML; MG/100ML; MG/100ML; MG/100ML
INJECTION, SOLUTION INTRAVENOUS CONTINUOUS
Status: DISCONTINUED | OUTPATIENT
Start: 2025-03-03 | End: 2025-03-04

## 2025-03-03 RX ORDER — LACTULOSE 10 G/15ML
300 SOLUTION ORAL EVERY 12 HOURS
Status: DISCONTINUED | OUTPATIENT
Start: 2025-03-03 | End: 2025-03-03

## 2025-03-03 RX ORDER — THIAMINE HYDROCHLORIDE 100 MG/ML
200 INJECTION, SOLUTION INTRAMUSCULAR; INTRAVENOUS 2 TIMES DAILY
Status: DISCONTINUED | OUTPATIENT
Start: 2025-03-03 | End: 2025-03-04

## 2025-03-03 RX ORDER — CALCIUM GLUCONATE 20 MG/ML
1 INJECTION, SOLUTION INTRAVENOUS ONCE
Status: COMPLETED | OUTPATIENT
Start: 2025-03-03 | End: 2025-03-03

## 2025-03-03 RX ORDER — INSULIN LISPRO 100 [IU]/ML
2-9 INJECTION, SOLUTION INTRAVENOUS; SUBCUTANEOUS
Status: DISCONTINUED | OUTPATIENT
Start: 2025-03-03 | End: 2025-03-05 | Stop reason: HOSPADM

## 2025-03-03 RX ORDER — OMEPRAZOLE 20 MG/1
20 CAPSULE, DELAYED RELEASE ORAL 2 TIMES DAILY
Status: DISCONTINUED | OUTPATIENT
Start: 2025-03-03 | End: 2025-03-04

## 2025-03-03 RX ADMIN — ONDANSETRON 4 MG: 2 INJECTION INTRAMUSCULAR; INTRAVENOUS at 07:15

## 2025-03-03 RX ADMIN — HEPARIN SODIUM 5000 UNITS: 5000 INJECTION, SOLUTION INTRAVENOUS; SUBCUTANEOUS at 05:15

## 2025-03-03 RX ADMIN — OMEPRAZOLE 20 MG: 20 CAPSULE, DELAYED RELEASE ORAL at 17:54

## 2025-03-03 RX ADMIN — THIAMINE HYDROCHLORIDE 200 MG: 100 INJECTION, SOLUTION INTRAMUSCULAR; INTRAVENOUS at 06:08

## 2025-03-03 RX ADMIN — THIAMINE HYDROCHLORIDE 200 MG: 100 INJECTION, SOLUTION INTRAMUSCULAR; INTRAVENOUS at 17:54

## 2025-03-03 RX ADMIN — CALCIUM GLUCONATE 1 G: 20 INJECTION, SOLUTION INTRAVENOUS at 04:44

## 2025-03-03 RX ADMIN — SODIUM CHLORIDE, POTASSIUM CHLORIDE, SODIUM LACTATE AND CALCIUM CHLORIDE: 600; 310; 30; 20 INJECTION, SOLUTION INTRAVENOUS at 17:58

## 2025-03-03 RX ADMIN — POTASSIUM CHLORIDE 10 MEQ: 7.46 INJECTION, SOLUTION INTRAVENOUS at 01:11

## 2025-03-03 RX ADMIN — SODIUM CHLORIDE 13 UNITS/HR: 9 INJECTION, SOLUTION INTRAVENOUS at 06:04

## 2025-03-03 RX ADMIN — POTASSIUM CHLORIDE 10 MEQ: 7.46 INJECTION, SOLUTION INTRAVENOUS at 08:59

## 2025-03-03 RX ADMIN — DEXTROSE AND SODIUM CHLORIDE: 10; .45 INJECTION, SOLUTION INTRAVENOUS at 05:04

## 2025-03-03 RX ADMIN — MAGNESIUM SULFATE HEPTAHYDRATE 2 G: 2 INJECTION, SOLUTION INTRAVENOUS at 05:33

## 2025-03-03 RX ADMIN — RIVAROXABAN 10 MG: 10 TABLET, FILM COATED ORAL at 17:54

## 2025-03-03 RX ADMIN — INSULIN GLARGINE-YFGN 25 UNITS: 100 INJECTION, SOLUTION SUBCUTANEOUS at 09:37

## 2025-03-03 RX ADMIN — DEXTROSE MONOHYDRATE 25 G: 25 INJECTION, SOLUTION INTRAVENOUS at 17:42

## 2025-03-03 RX ADMIN — CEFAZOLIN 2 G: 2 INJECTION, POWDER, FOR SOLUTION INTRAMUSCULAR; INTRAVENOUS at 13:25

## 2025-03-03 RX ADMIN — POTASSIUM CHLORIDE 10 MEQ: 7.46 INJECTION, SOLUTION INTRAVENOUS at 06:11

## 2025-03-03 RX ADMIN — CEFAZOLIN 2 G: 2 INJECTION, POWDER, FOR SOLUTION INTRAMUSCULAR; INTRAVENOUS at 22:18

## 2025-03-03 ASSESSMENT — ENCOUNTER SYMPTOMS
NERVOUS/ANXIOUS: 1
RESPIRATORY NEGATIVE: 1
FEVER: 0
NAUSEA: 1
MUSCULOSKELETAL NEGATIVE: 1
WEAKNESS: 1
CARDIOVASCULAR NEGATIVE: 1
VOMITING: 1
EYES NEGATIVE: 1
CHILLS: 1

## 2025-03-03 ASSESSMENT — PAIN DESCRIPTION - PAIN TYPE
TYPE: ACUTE PAIN

## 2025-03-03 NOTE — HOSPITAL COURSE
3/3-DKA protocol, titrating insulin, add thiamine, cefazolin for possible LE cellulitis, attempt transition to SQ regimen

## 2025-03-03 NOTE — PROGRESS NOTES
"Critical Care Progress Note    Date of admission  3/2/2025    Chief Complaint  \"54y M hx of DM1, chol, depression, cervical radiculopathy. That per limit report ran out of his insulin for 3 days. He presented by EMS with tachycardia, kussmal breathing and hyperglycemia. He was given 1L of fluids by EMS and in ER received 2 more liter. He can provide limited hx no family by phone with the 5 different numbers. He was found to be in severe DKA AG 41, K 7.3 and bicarb 2, glucose of 710, [place]. He does describe some nausea and abdominal pain and on exam his is mottled to his bilateral lower extremities and hands. His cardiac function is preserved with kissing LV. He has had no shock and BP has been normal. He was given hyperkalemia protocol and I have been consulted for admission. [id number]//[id number] \" H+P    Hospital Course  3/3-DKA protocol, titrating insulin, add thiamine, cefazolin for possible LE cellulitis, attempt transition to SQ regimen     Interval Problem Update  Reviewed last 24 hour events:  Admitted overnight  Some concern for lower extremity vascular insufficiency, denies leg pain    Neuro:     CV:  HR low 100s  -160's    Resp:   3L OM    I/O: +832  UOP: 3150    Tmax: AF  Heme: WBC 17    Abx:   (3/3- ) Cefazolin (possible LE cellulitis)  Micro:  NGTD    Endo: On insulin infusion    LDA: PIVs  SUP: NI  VTE: SQH  Diet: NPO          Review of Systems  Review of Systems   Unable to perform ROS: Critical illness        Vital Signs for last 24 hours   Temp:  [36.1 °C (97 °F)-36.3 °C (97.4 °F)] 36.1 °C (97 °F)  Pulse:  [] 100  Resp:  [15-33] 21  BP: (101-166)/(51-78) 142/65  SpO2:  [89 %-100 %] 91 %    Hemodynamic parameters for last 24 hours       Respiratory Information for the last 24 hours       Physical Exam   Physical Exam  Constitutional:       General: He is not in acute distress.     Appearance: He is ill-appearing.   HENT:      Head: Normocephalic.   Eyes:      Pupils: Pupils are equal, " round, and reactive to light.   Cardiovascular:      Rate and Rhythm: Regular rhythm. Tachycardia present.   Pulmonary:      Comments: Rattly, wet cough, no stridor, phonation ok    Large MV, no WOB  Abdominal:      General: Abdomen is flat.      Palpations: Abdomen is soft.   Musculoskeletal:      Right lower leg: Edema present.      Left lower leg: Edema present.      Comments: Bilateral LE erythema to shins, some edema  Some erythema but fairly symmetric, no soft tissue ttp    Compartments soft, not focal ttp    Right foot somewhat cooler than left, but warm above ankle with good CRT  CRT in both feet < 3 s   Skin:     General: Skin is warm.      Capillary Refill: Capillary refill takes less than 2 seconds.   Neurological:      Comments: Slowed speech, somewhat drowsy but awakens easily  No defecits         Medications  Current Facility-Administered Medications   Medication Dose Route Frequency Provider Last Rate Last Admin    thiamine (B-1) injection 200 mg  200 mg Intravenous BID Adan Basilio M.D.   200 mg at 03/03/25 0608    insulin GLARGINE (Lantus,Semglee) injection  25 Units Subcutaneous QAM INSULIN Adan Basilio M.D.   25 Units at 03/03/25 0937    insulin lispro (HumaLOG,AdmeLOG) subcutaneous injection  2-9 Units Subcutaneous 4X/DAY CHUCKYS Adan Basilio M.D.        And    dextrose 50% (D50W) injection 25 g  25 g Intravenous Q15 MIN PRN Adan Basilio M.D.        ceFAZolin (Ancef) 2 g in  mL IVPB  2 g Intravenous Q8HRS Adan Basilio M.D.        heparin injection 5,000 Units  5,000 Units Subcutaneous Q8HRS Sean Jennings M.D.   5,000 Units at 03/03/25 0515    ondansetron (Zofran) syringe/vial injection 4 mg  4 mg Intravenous Q6HRS PRN Aislinn BROOKS Latona   4 mg at 03/03/25 0715       Fluids    Intake/Output Summary (Last 24 hours) at 3/3/2025 1321  Last data filed at 3/3/2025 1148  Gross per 24 hour   Intake 5051.21 ml   Output 3950 ml   Net 1101.21 ml       Laboratory      Recent  Labs     03/02/25 2057   CPKTOTAL 188*     Recent Labs     03/02/25 2057 03/02/25 2359 03/03/25 0406 03/03/25  0800   SODIUM 124* 127* 131* 134*   POTASSIUM 6.0* 5.3 4.7 4.5   CHLORIDE 80* 84* 94* 98   CO2 3* 5* 14* 21   BUN 49* 46* 41* 39*   CREATININE 1.46* 1.44* 1.21 1.18   MAGNESIUM 2.2  --  1.8  --    PHOSPHORUS 7.2*  --  2.7  --    CALCIUM 8.2* 7.9* 8.1* 8.1*     Recent Labs     03/02/25 1859 03/02/25 2057 03/02/25 2119 03/02/25 2359 03/03/25 0406 03/03/25  0800   ALTSGPT 177* 167*  --   --  136*  --    ASTSGOT 102* 89*  --   --  66*  --    ALKPHOSPHAT 277* 269*  --   --  213*  --    TBILIRUBIN <0.2 <0.2  --   --  0.2  --    DBILIRUBIN  --   --   --   --  <0.2  --    GLUCOSE 710* 647*   < > 443* 249* 206*    < > = values in this interval not displayed.     Recent Labs     03/02/25 1859 03/02/25 2057 03/02/25 2359 03/03/25 0406   WBC 18.7* 20.8* 17.0*  --    NEUTSPOLYS 87.90* 79.70* 80.80*  --    LYMPHOCYTES 3.40* 7.20* 8.00*  --    MONOCYTES 7.80 11.40 9.10  --    EOSINOPHILS 0.00 0.10 0.20  --    BASOPHILS 0.00 0.30 0.50  --    ASTSGOT 102* 89*  --  66*   ALTSGPT 177* 167*  --  136*   ALKPHOSPHAT 277* 269*  --  213*   TBILIRUBIN <0.2 <0.2  --  0.2     Recent Labs     03/02/25 1859 03/02/25 2057 03/02/25 2359   RBC 3.96* 3.90* 3.78*   HEMOGLOBIN 11.0* 10.9* 10.8*   HEMATOCRIT 36.9* 36.5* 34.3*   PLATELETCT 322 306 260       Imaging  CT:    Reviewed  Ultrasound:  Reviewed    Assessment/Plan  * DKA, type 1, not at goal (HCC)- (present on admission)  Assessment & Plan  ICU admission, cardiac monitoring  Likely inciting event states he ran out of his medications  A1C 15    Appears euvolemic on physical exam, normotensive  DKA protocol with insulin drip at 0.1 units/kg/hr  Every hour Accu-Cheks, every 4 hour BMP, mag, phos  Goal Magnesium: >2, Phosphorus: 2, K >4  Will transition to sliding scale insulin when anion gap <12, CO2 > 17      Vascular insufficiency of extremity  Assessment & Plan  RLE  CTA with concern for potentially occluded peroneal artery but no evidence of high grade arterial stenosis of large vessel  Has pulses and decent CRT in foot  RLE arterial sono with multiphasic flow in all major arteries    No evidence of ongoing ischemic limb  Serial NV checks  Will potentially need antiplatelet agent and statin and risk modification/management    Lower extremity edema  Assessment & Plan  Fairly symmetric, hyperemic , through with some right sided predominance  No compartment tenderness  Non suppurative    Given appearance and clinical circumstance going to treat empirically for possible cellulitis  CT of RLE shows some soft tissue subQ edema, no fluid collection or abscess  BLE arterial US with multiphasic flows and normal sonographic DARLENE on the left, DARLENE not obtained on right  No e/o deep space infection at this time    Cefazolin for now, can transition to keflex when taking orals, 7 day course    Acute metabolic encephalopathy  Assessment & Plan  Due to DKA associated metabolic derangements  Non focal exam    NH3 WNL  TSH 2.2    Avoid deliriogenic medications  Follow neuro exam    Transaminitis  Assessment & Plan  Unclear etiology, CPK mildly elevated     CT A/P without evidence of biliary obstruction or cirrhosis  RUQUS with fatty live and no cholecystitis    Daily CMP  APAP and coags pending  Hepatitis panel negative      Hyperkalemia  Assessment & Plan  Likely related to DKA and severe acidemia  Now resolved    Serial monitoring BMP  Monitor on telemetry  Strict I's and O's with Medley placement    KYREE (acute kidney injury) (HCC)  Assessment & Plan  Likely due to hypovolemia and DKA  sCr improving with resuscitation    Maintain MAP greater than 65  CPK 180s  Non oliguric  I/Os       I have performed a physical exam and reviewed and updated ROS and Plan today (3/3/2025). In review of yesterday's note (3/2/2025), there are no changes except as documented above.     Discussed patient condition and  risk of morbidity and/or mortality with RN, RT, Therapies, Pharmacy, and Patient  The patient remains critically ill.  Critical care time = 50 minutes in directly providing and coordinating critical care and extensive data review.  No time overlap and excludes procedures.

## 2025-03-03 NOTE — ASSESSMENT & PLAN NOTE
Fairly symmetric, hyperemic , through with some right sided predominance  No compartment tenderness  Non suppurative    Given appearance and clinical circumstance going to treat empirically for possible cellulitis  CT of RLE shows some soft tissue subQ edema, no fluid collection or abscess  BLE arterial US with multiphasic flows and normal sonographic DARLENE on the left, DARLENE not obtained on right  No e/o deep space infection at this time    Cefazolin for now, can transition to keflex when taking orals, 7 day course

## 2025-03-03 NOTE — DIETARY
Nutrition Services: Diabetes Diet Education Consult   Day 1 of admit.  Colton Prasad Jr. is a 54 y.o. male with admitting DX of DKA, type 1.    RD received referral for T1DM diet education, pt with hx of T1DM and A1C is 15.3. RD able to provide information via discharge instructions which includes nutrition recommendations and tips to support a healthy dietary pattern that aligns within pt's current dx. This includes outpatient resources to [facility] for continued nutrition education and guidance as desired.     No other education needs identified at this time. Please consult RD otherwise for supplemental education or at the request of patient.    Please re-consult RD PRN

## 2025-03-03 NOTE — ASSESSMENT & PLAN NOTE
Unclear etiology, CPK mildly elevated     CT A/P without evidence of biliary obstruction or cirrhosis  RUQUS with fatty live and no cholecystitis    Daily CMP  APAP and coags pending  Hepatitis panel negative

## 2025-03-03 NOTE — ASSESSMENT & PLAN NOTE
Likely due to hypovolemia and DKA  sCr improving with resuscitation    Maintain MAP greater than 65  CPK 180s  Non oliguric  I/Os

## 2025-03-03 NOTE — DISCHARGE PLANNING
Case Management Discharge Planning    Admission Date: 3/2/2025  GMLOS:    ALOS: 1    6-Clicks ADL Score: 24  6-Clicks Mobility Score: 20    Anticipated Discharge Dispo: Discharge Disposition: Discharged to home/self care (01)    DME Needed: No    Action(s) Taken:   Chart review completed. Patient was discussed during IDT rounds.     Discharge assessment was completed (see below).     Escalations Completed: None    Medically Clear: No    Next Steps:  CM RN to follow up with medical team to discuss discharge barriers or needs.     Barriers to Discharge: Medical clearance    Care Transition Team Assessment    CM RN met with pt at the bedside to complete discharge assessment. Pt was A/Ox3 (disoriented to time) and agreeable to assessment.     Case management role discussed; understanding of case management role verbalized.   Demographics on face sheet verified.   Please see H&P for pertinent PMH and reason for admission.   Housing: Pt lived with his father, Julian, in a single story house at the address verified on the face sheet.   IADLS/ADLS: Independent with ADL/IALDS and ambulated with a cane at baseline.   Transportation: Pt does not drive and relies on family and public transportation.  DME: Cane  O2: RA at baseline   Discharge support: Father is primary support  Social History: Pt reported he is not  and does not have any children.   Patient's PCP is: Not established; declined assistance establishing.   Patient's preferred pharmacy is: Mail order pharmacy   Insurance: [place] Medicaid FFS   Monthly Income/employment status: Unemployed    AD paperwork: None on file; Pt refused AD packet.   Mental Health Concerns/Substance Abuse: None indicated  Prior services: None - home independent  Discharge planning: Rehab vs SNF vs HH    Pt's goal is to return home with his father upon medical clearance. Verbalized agreement with post-acute placement/resources upon discharge if indicated.     Information Source  Orientation  Level: Oriented to person, Oriented to situation, Oriented to place, Disoriented to time  Information Given By: Patient  Who is responsible for making decisions for patient? : Patient    Readmission Evaluation  Is this a readmission?: No    Elopement Risk  Legal Hold: No  Ambulatory or Self Mobile in Wheelchair: No-Not an Elopement Risk    Interdisciplinary Discharge Planning  Lives with - Patient's Self Care Capacity: Parents  Patient or legal guardian wants to designate a caregiver: No  Support Systems: Parent  Housing / Facility: [address]    Discharge Preparedness  What is your plan after discharge?: Home with help  What are your discharge supports?: Parent  Prior Functional Level: Ambulatory, Independent with Activities of Daily Living, Independent with Medication Management, Uses Cane    Functional Assesment  Prior Functional Level: Ambulatory, Independent with Activities of Daily Living, Independent with Medication Management, Uses Cane    Finances  Financial Barriers to Discharge: No  Prescription Coverage: Yes    Vision / Hearing Impairment  Vision Impairment : No  Hearing Impairment : No    Advance Directive  Advance Directive?: None  Advance Directive offered?: AD Booklet refused    Domestic Abuse  Have you ever been the victim of abuse or violence?: No  Possible Abuse/Neglect Reported to:: Not Applicable    Psychological Assessment  History of Substance Abuse: None  History of Psychiatric Problems: No    Discharge Risks or Barriers  Discharge risks or barriers?: Complex medical needs  Patient risk factors: Complex medical needs    Anticipated Discharge Information  Discharge Disposition: Discharged to home/self care (01)

## 2025-03-03 NOTE — WOUND TEAM
"[facility]  Inpatient Services  Wound and Skin Care Brief Evaluation    Admission Date: 3/2/2025     Last order of IP CONSULT TO WOUND CARE was found on 3/3/2025 from Hospital Encounter on 3/2/2025     HPI, PMH, SH: Reviewed    Chief Complaint   Patient presents with    High Blood Sugar     Pt BIB EMS. Per report pt has not had insulin for 3 days, BG \"HIGH\" PTA. Pt with multiple recent GLF, no trauma noted, negative thinners.     Leg Swelling    GLF     Diagnosis: DKA, type 1, not at goal (HCC) [E10.10]    Unit where seen by Wound Team: R100/00     Wound consult placed regarding lower back. Chart and images reviewed. This discussed with bedside RN Mary. This clinician in to assess patient.  Patient lower back pink and intact. Scar noted to patient mid back.     Patient sacrum and BL heels also assessed during encounter and found to be pink and intact.    No pressure injuries or advanced wound care needs identified. Wound consult completed. No further follow up unless indicated and consulted.     Mid Back       Lower Back/Sacrum      Right Heel      Left Heel             PREVENTATIVE INTERVENTIONS:    Q shift Giancarlo - performed per nursing policy  Q shift pressure point assessments - performed per nursing policy    Surface/Positioning  ICU Low Airloss - Currently in Place    Offloading/Redistribution  Sacral offloading dressing (Silicone dressing) - Currently in Place  Heel offloading dressing (Silicone dressing) - Currently in Place  "

## 2025-03-03 NOTE — ASSESSMENT & PLAN NOTE
Due to DKA associated metabolic derangements  Non focal exam    NH3 WNL  TSH 2.2    Avoid deliriogenic medications  Follow neuro exam

## 2025-03-03 NOTE — ED NOTES
Assist RN: FSBG assessed 572 mg/dl. Humulin R infusion initiated as ordered, verified with nurse Rachael.

## 2025-03-03 NOTE — ED NOTES
Bedside report received from off going RN Mark, assumed care of patient.  POC discussed with patient. Call light within reach, all needs addressed at this time.       Fall risk interventions in place: Patient's personal possessions are with in their safe reach, Place fall risk sign on patient's door, Give patient urinal if applicable, and Keep floor surfaces clean and dry (all applicable per [place] Fall risk assessment)   Continuous monitoring: Cardiac Leads, Pulse Ox, or Blood Pressure  IVF/IV medications: Infusion per MAR (List Med(s)) NS 1 L ongoing  Oxygen: Room Air  Bedside sitter: Not Applicable   Isolation: Not Applicable

## 2025-03-03 NOTE — ASSESSMENT & PLAN NOTE
ICU admission, cardiac monitoring  Likely inciting event states he ran out of his medications  A1C 15    Appears euvolemic on physical exam, normotensive  DKA protocol with insulin drip at 0.1 units/kg/hr  Every hour Accu-Cheks, every 4 hour BMP, mag, phos  Goal Magnesium: >2, Phosphorus: 2, K >4  Will transition to sliding scale insulin when anion gap <12, CO2 > 17

## 2025-03-03 NOTE — PROGRESS NOTES
Received report and assumed care of patient at change of shift. Patient is A&Ox4, on 2L NC, and reports no pain at this time. Pt has bilateral lower led edema. Pulses present on feet. Patient assessment completed, bed in lowest position, and call light and personal belongings are within reach. Patient expressed no further needs at this time.

## 2025-03-03 NOTE — PROGRESS NOTES
Report called to Harvinder SANFORD on Annelise 5, pt pending transport to [facility].

## 2025-03-03 NOTE — DISCHARGE INSTR - DIET
Carbohydrate Counting for People With Diabetes  Foods with carbohydrates make your blood glucose level go up. Learning how to count carbohydrates can help you control your blood glucose levels. First, identify the foods you eat that contain carbohydrates. Then, using the Foods with Carbohydrates chart, determine about how much carbohydrates are in your meals and snacks. Make sure you are eating foods with fiber, protein, and healthy fat along with your carbohydrate foods.    Foods with Carbohydrates  The following table shows carbohydrate foods that have about 15 grams of carbohydrate each. Using measuring cups, spoons, or a food scale when you first begin learning about carbohydrate counting can help you learn about the portion sizes you typically eat.      The following foods have 15 grams carbohydrate each:    Grains  1 slice bread (1 ounce)  1 small tortilla (6-inch size)  ¼ large bagel (1 ounce)  1/3 cup pasta or rice (cooked)  ½ hamburger or hot dog bun (¾ ounce)  ½ cup cooked cereal  ½ to ¾ cup ready-to-eat cereal  2 taco shells (5-inch size)    Fruit  1 small fresh fruit (¾ to 1 cup)  ½ medium banana  17 small grapes (3 ounces)  1 cup melon or berries  ½ cup canned or frozen fruit  2 tablespoons dried fruit (blueberries, cherries, cranberries, raisins)  ½ cup unsweetened fruit juice    Starchy Vegetables  ½ cup cooked beans, peas, corn, potatoes/sweet potatoes  ¼ large baked potato (3 ounces)  1 cup acorn or butternut squash    Snack Foods  3 to 6 crackers  8 potato chips or 13 tortilla chips (¾ ounce to 1 ounce)  3 cups popped popcorn    Dairy  3/4 cup (6 ounces) nonfat plain yogurt, or yogurt with sugar-free sweetener  1 cup milk  1 cup plain rice, soy, coconut or flavored almond milk    Sweets and Desserts  ½ cup ice cream or frozen yogurt  1 tablespoon jam, jelly, pancake syrup, table sugar, or honey  2 tablespoons light pancake syrup  1 inch square of frosted cake or 2 inch square of unfrosted cake  2  small cookies (2/3 ounce each) or ¼ large cookie    Sometimes you’ll have to estimate carbohydrate amounts if you don’t know the exact recipe. One cup of mixed foods like soups can have 1 to 2 carbohydrate servings, while some casseroles might have 2 or more servings of carbohydrate.    Foods that have less than 20 calories in each serving can be counted as “free” foods. Count 1 cup raw vegetables, or ½ cup cooked non-starchy vegetables as “free” foods. If you eat 3 or more servings at one meal, then count them as 1 carbohydrate serving.    Foods without Carbohydrates  Not all foods contain carbohydrates. Meat, some dairy, fats, non-starchy vegetables, and many beverages don’t contain carbohydrate. So when you count carbohydrates, you can generally exclude chicken, pork, beef, fish, seafood, eggs, tofu, cheese, butter, sour cream, avocado, nuts, seeds, olives, mayonnaise, water, black coffee, unsweetened tea, and zero-calorie drinks. Vegetables with no or low carbohydrate include green beans, cauliflower, tomatoes, and onions.    How much carbohydrate should I eat at each meal?  Carbohydrate counting can help you plan your meals and manage your weight. Following are some starting points for carbohydrate intake at each meal. Work with your registered dietitian nutritionist to find the best range that works for your blood glucose and weight.       Women  2 - 3 carb servings (To Lose Weight)  3 - 4 carb servings (To Maintain Weight)    Men  3 - 4 carb servings (To Lose Weight)  4 - 5 carb servings (To Maintain Weight)      Checking your blood glucose after meals will help you know if you need to adjust the timing, type, or number of carbohydrate servings in your meal plan. Achieve and keep a healthy body weight by balancing your food intake and physical activity.         How should I plan my meals?  Plan for half the food on your plate to include non-starchy vegetables, like salad greens, broccoli, or carrots. Try to  eat 3 to 5 servings of non-starchy vegetables every day. Have a protein food at each meal. Protein foods include chicken, fish, meat, eggs, or beans (note that beans contain carbohydrate). These two food groups (non-starchy vegetables and proteins) are low in carbohydrate. If you fill up your plate with these foods, you will eat less carbohydrate but still fill up your stomach. Try to limit your carbohydrate portion to ¼ of the plate.    What fats are healthiest to eat?  Diabetes increases risk for heart disease. To help protect your heart, eat more healthy fats, such as olive oil, nuts, and avocado. Eat less saturated fats like butter, cream, and high-fat meats, like andersen and sausage. Avoid trans fats, which are in all foods that list “partially hydrogenated oil” as an ingredient.      What should I drink?  Choose drinks that are not sweetened with sugar. The healthiest choices are water, carbonated or seltzer borrego, and tea and coffee without added sugars.    Sweet drinks will make your blood glucose go up very quickly. One serving of soda or energy drink is ½ cup. It is best to drink these beverages only if your blood glucose is low.    Artificially sweetened, or diet drinks, typically do not increase your blood glucose if they have zero calories in them. Read labels of beverages, as some diet drinks do have carbohydrate and will raise your blood glucose.    Label Reading Tips  Read Nutrition Facts labels to find out how many grams of carbohydrate are in a food you want to eat. Don’t forget: sometimes serving sizes on the label aren’t the same as how much food you are going to eat, so you may need to calculate how much carbohydrate is in the food you are serving yourself.        The Nutrition Facts information is based on a standard serving size. However, that serving size may not be the same as the serving size used in carbohydrate counting.  Always start by checking the serving size on the label. Is this the  serving size you will be eating? How many servings are in the package?  Next, look at the total carbohydrate. It is measured in grams (g). To find the number of carbohydrate servings in 1 standard serving of a food, divide the total grams of carbohydrate by 15. One (1) carbohydrate serving is the amount of food with 15 g carbohydrate.  You don’t need to count grams of sugars. They are included in the total carbohydrate.  The label shows how many calories are in the standard serving. It also lists the amount of fat, cholesterol, sodium, protein, and some vitamins and minerals. Talk to your registered dietitian nutritionist or diabetes educator to learn about your goals for these nutrients.  Look below the line listing total fat to find out how much of that fat is saturated fat or trans fat. Choose foods that are low in these kinds of fats because they are not healthy for your heart. In the foods that are healthiest for your heart, grams of saturated fat and trans fat are less than one-third of the total fat grams.  If foods are very low in calories (less than 20 calories per serving) or carbohydrates (5 g carbohydrate or less per serving), you may not need to count them when you count carbohydrates. Ask your registered dietitian nutritionist or diabetes educator about these “free” foods.        Nutrition Counseling  Our expert team offers:   Medical Nutrition Therapy for Chronic Conditions   Weight Management   Diabetes Education and Management   Wellness Services   Body Composition Measurements   Gastrointestinal Health    Nutrition Counseling Services are located at:  [address] [place] [id number]  For more information and to schedule a consultation, please call [phone].  A physician referral may be required by your insurance for coverage.

## 2025-03-03 NOTE — ED NOTES
Med Rec complete per historical   Antibiotics in the past 30 days:no  Anticoagulant in past 14 days:no  Pharmacy patient utilizes:[facility]    Pt unable to participate in med rec interview, was not answering my questions. Mother's phone number on demographics in no longer in service    No patient at any [facility] pharmacy per staff.     Medications removed from med rec (outdated)

## 2025-03-03 NOTE — ED NOTES
Unable to obtain med rec at this time. Patient unable to participate in med rec interview not responding to my questions. Phone number on file for Mother is no longer in service. Unable to get a hold of a pharmacy staff at [facility].

## 2025-03-03 NOTE — PROGRESS NOTES
Notified Nayeli MCCORMICK of pt's CT results with possible occlusion of distal peroneal artery. Pt has greater than 3 seconds capillary refill on lower extremities and edema. Pedal pulses present bilaterally via doppler. Provider states they will review the scan and order heparin if appropriate.

## 2025-03-03 NOTE — ASSESSMENT & PLAN NOTE
In setting of medication noncompliance x 3 days  -- insulin drip until AG closes and serum bicarb >18 OR until beta-hydroxybuterate negative   -- q1 hr gucose checks  -- Serial chemistries, replete as needed  --pending serum osm, ketones, HgA1c, VBG/ABG  -- IVF per protocol   -- Will need diabetic education

## 2025-03-03 NOTE — ED TRIAGE NOTES
"Chief Complaint   Patient presents with    High Blood Sugar     Pt BIB EMS. Per report pt has not had insulin for 3 days, BG \"HIGH\" PTA. Pt with multiple recent GLF, no trauma noted, negative thinners.     Leg Swelling    GLF       "

## 2025-03-03 NOTE — ASSESSMENT & PLAN NOTE
RLE CTA with concern for potentially occluded peroneal artery but no evidence of high grade arterial stenosis of large vessel  Has pulses and decent CRT in foot  RLE arterial sono with multiphasic flow in all major arteries    No evidence of ongoing ischemic limb  Serial NV checks  Will potentially need antiplatelet agent and statin and risk modification/management

## 2025-03-03 NOTE — ASSESSMENT & PLAN NOTE
LV function is preserved on Dr. Jennings's bedside exam  Palpable pulses in bilateral femoral arteries  -- Pending bilateral lower extremity CTA to rule out acute vascular insufficiency

## 2025-03-03 NOTE — ASSESSMENT & PLAN NOTE
Likely related to DKA and severe acidemia  Now resolved    Serial monitoring BMP  Monitor on telemetry  Strict I's and O's with Medley placement

## 2025-03-03 NOTE — H&P
"[facility] Internal Medicine History & Physical Note    Date of Service  3/2/2025    [facility] Team: [facility] ICU Gold Team   Attending: Sean Jennings M.d.      Primary Care Physician  Pcp Not In Computer  Code Status  Full Code    Chief Complaint  Chief Complaint   Patient presents with    High Blood Sugar     Pt BIB EMS. Per report pt has not had insulin for 3 days, BG \"HIGH\" PTA. Pt with multiple recent GLF, no trauma noted, negative thinners.     Leg Swelling    GLF       History of Presenting Illness (HPI):   Colton Prasad Jr. is a 54 y.o. male who presented 3/2/2025  Found down and reports of altered mental status, pertinent past medical history of type 1 diabetes for which he says he has been out of insulin for the last 3 to 4 days with associated nausea but no vomiting.  Denies any head trauma, chest pain, shortness of breath, sick contacts.   Patient in severe DKA with glucose 710, potassium 7.3, anion gap of 41,  bicarb 2, VBG pH 6.92/pCO2 16.9/pO2 62.2.   In the ED patient was given hyperkalemia protocol and started on DKA protocol. Received 1L NS by EMS and 2L LR in ED.   Chest x-ray unremarkable, EKG sinus rhythm, rate 94.     Admit to ICU.       Review of Systems   Constitutional:  Positive for chills. Negative for fever.   Respiratory:  Negative for shortness of breath.    Cardiovascular:  Positive for leg swelling. Negative for chest pain.   Gastrointestinal:  Positive for nausea. Negative for abdominal pain and vomiting.   Genitourinary:  Negative for dysuria.   Neurological:  Positive for weakness.       Past Medical History   has a past medical history of Diabetes and IVDA (Intravenous Drug Abus].    Surgical History   has a past surgical history that includes hip nailing intramedullary (Left, 12/23/2017).     Family History  Unable to elicit, critically ill    Social History  Unable to elicit, critically ill    Allergies  Allergies   Allergen Reactions    Nkda [No Known Drug Allergy]  " "      Medications  None       Physical Exam  Temp:  [36.3 °C (97.4 °F)] 36.3 °C (97.4 °F)  Pulse:  [] 98  Resp:  [19-33] 21  BP: (101-139)/(51-65) 139/65  SpO2:  [98 %-100 %] 98 %  Blood Pressure: 114/57   Temperature: 36.3 °C (97.4 °F)   Pulse: (!) 128   Respiration: (!) 24   Pulse Oximetry: 98 %         Physical Exam:   Acutely ill-appearing, eyes are closed but can answer yes/no questions  Kussmaul breathing without respiratory distress, diminished at bases  Tachycardic with no appreciable murmurs  Abdomen is soft without tenderness to palpation  Lower extremities are cool to level of the knees with palpable, unable to appreciate pedal pulses, bilateral femoral pulses palpable, mottled appearance.   Somnolent but wakes to voice, follows commands; motor function and sensation intact throughout       Laboratory:  Recent Labs     03/02/25 1859 03/02/25 2057   WBC 18.7* 20.8*   RBC 3.96* 3.90*   HEMOGLOBIN 11.0* 10.9*   HEMATOCRIT 36.9* 36.5*   MCV 93.2 93.6   MCH 27.8 27.9   MCHC 29.8* 29.9*   RDW 45.7 45.1   PLATELETCT 322 306   MPV 11.0 11.0     Recent Labs     03/02/25 1859   SODIUM 120*   POTASSIUM 7.3*   CHLORIDE 77*   CO2 2*   GLUCOSE 710*   BUN 48*   CREATININE 1.55*   CALCIUM 8.1*     Recent Labs     03/02/25 1859   ALTSGPT 177*   ASTSGOT 102*   ALKPHOSPHAT 277*   TBILIRUBIN <0.2   GLUCOSE 710*         Recent Labs     03/02/25 1859   NTPROBNP 1921*         No results for input(s): \"TROPONINT\" in the last 72 hours.    Imaging:  DX-CHEST-LIMITED (1 VIEW)   Final Result      No acute cardiopulmonary disease.      CT-ABDOMEN-PELVIS WITH    (Results Pending)   CT-CTA LOWER EXT WITH & W/O-POST PROCESS RIGHT    (Results Pending)   CT-CTA LOWER EXT WITH & W/O-POST PROCESS LEFT    (Results Pending)       X-Ray:  I have personally reviewed the images and compared with prior images.  EKG:  I have personally reviewed the images and compared with prior images.    Assessment/Plan:  Problem Representation: 54 " year old male admitted with severe DKA   I anticipate this patient will require at least two midnights for appropriate medical management, necessitating inpatient admission because of severe DKA   Patient will need a ICU (Adult and Pediatrics) bed on MEDICAL service .  The need is secondary to severe DKA.    * DKA, type 1, not at goal (HCC)- (present on admission)  Assessment & Plan  In setting of medication noncompliance x 3 days  -- insulin drip until AG closes and serum bicarb >18 OR until beta-hydroxybuterate negative   -- q1 hr gucose checks  -- Serial chemistries, replete as needed  --pending serum osm, ketones, HgA1c, VBG/ABG  -- IVF per protocol   -- Will need diabetic education     Acute metabolic encephalopathy  Assessment & Plan  Due to DKA associated metabolic derangements  -- Serial neuroexams  -- Avoid sedatives  -- Pending UDS, TSH, ammonia, Phos    Transaminitis  Assessment & Plan  --Avoid hepatotoxins  -- Serial monitoring  -- Follow-up CT abdomen  -- Pending CPK  -- Pending acute hepatitis panel    Pseudohyponatremia  Assessment & Plan  LV function is preserved on Dr. Jennings's bedside exam  Palpable pulses in bilateral femoral arteries  -- Pending bilateral lower extremity CTA to rule out acute vascular insufficiency    Mottled skin  Assessment & Plan  LV function is preserved on Dr. Jennings's bedside exam  Palpable pulses in bilateral femoral arteries  -- Pending bilateral lower extremity CTA to rule out acute vascular insufficiency    Hyperkalemia  Assessment & Plan  Likely related to DKA  S/p hyperkalemia protocol with insulin in the ED  -- Serial monitoring BMP  -- Monitor on telemetry  -- Strict I's and O's with Medley placement  -- If no urine output may need urgent dialysis    KYREE (acute kidney injury) (HCC)  Assessment & Plan  Likely due to hypovolemia  --Maintain MAP greater than 65  -- Rule out post obstruction CT abdomen pelvis  -- Pending UA and CPK        VTE prophylaxis: heparin ppx

## 2025-03-03 NOTE — PROGRESS NOTES
4 Eyes Skin Assessment Completed by CLARIBEL Stein and CLARIBEL Salinas.    Head WDL  Ears Redness and Blanching  Nose Redness and Blanching  Mouth WDL  Neck Redness and Blanching  Breast/Chest Redness and Blanching  Shoulder Blades Redness and Blanching  Spine Redness, Blanching, and Non-Blanching, redness/non blanching, small abrasion,           (R) Arm/Elbow/Hand Redness and Blanching, mottled, healing scab hand       (L) Arm/Elbow/Hand Redness and Blanching, abrasion on elbow, mottled    Abdomen Redness and Blanching  Groin Redness and Blanching  Scrotum/Coccyx/Buttocks Redness and Blanching    (R) Leg Redness, Blanching, and Edema, mottled  (L) Leg Redness, Blanching, and Edema, mottled     (R) Heel/Foot/Toe Redness, Blanching, and Boggy, mottled   (L) Heel/Foot/Toe Redness and Blanching, mottled, boggy         Devices In Places Blood Pressure Cuff, Pulse Ox, and SCD's      Interventions In Place Heel Mepilex, Sacral Mepilex, TAP System, Pillows, Elbow Mepilex, Q2 Turns, Low Air Loss Mattress, and Heels Loaded W/Pillows    Possible Skin Injury Yes    Pictures Uploaded Into Epic Yes  Wound Consult Placed Yes  RN Wound Prevention Protocol Ordered Yes

## 2025-03-03 NOTE — CARE PLAN
The patient is Stable - Low risk of patient condition declining or worsening    Shift Goals  Clinical Goals: DKA protocol  Patient Goals: 'blankets'  Family Goals: LILI    Progress made toward(s) clinical / shift goals:      Problem: Knowledge Deficit - Standard  Goal: Patient and family/care givers will demonstrate understanding of plan of care, disease process/condition, diagnostic tests and medications  3/3/2025 0204 by Sarita Figueroa R.N.  Outcome: Progressing  3/3/2025 0204 by HERBERTH Santiago.N.  Outcome: Progressing     Problem: Skin Integrity  Goal: Skin integrity is maintained or improved  3/3/2025 0204 by HERBERTH Santiago.N.  Outcome: Progressing  3/3/2025 0204 by Sarita Figueroa R.N.  Outcome: Progressing     Problem: Fall Risk  Goal: Patient will remain free from falls  3/3/2025 0204 by Sarita Figueroa R.N.  Outcome: Progressing  3/3/2025 0204 by Sarita Figueroa R.N.  Outcome: Progressing     Problem: Respiratory  Goal: Patient will achieve/maintain optimum respiratory ventilation and gas exchange  Outcome: Progressing     Problem: Urinary Elimination  Goal: Establish and maintain regular urinary output  Outcome: Progressing     Problem: Neuro Status  Goal: Neuro status will remain stable or improve  Outcome: Progressing     Problem: Pain - Standard  Goal: Alleviation of pain or a reduction in pain to the patient’s comfort goal  Outcome: Progressing       Patient is not progressing towards the following goals:

## 2025-03-03 NOTE — CONSULTS
Critical Care Medicine Faculty Consult Note    Consulted by: Dr Dov Bradford    Reason for consult: DKA    HPI: 54y M hx of DM1, chol, depression, cervical radiculopathy. That per limit report ran out of his insulin for 3 days. He presented by EMS with tachycardia, kussmal breathing and hyperglycemia. He was given 1L of fluids by EMS and in ER received 2 more liter. He can provide limited hx no family by phone with the 5 different numbers. He was found to be in severe DKA AG 41, K 7.3 and bicarb 2, glucose of 710, kyree. He does describe some nausea and abdominal pain and on exam his is mottled to his bilateral lower extremities and hands. His cardiac function is preserved with kissing LV. He has had no shock and BP has been normal. He was given hyperkalemia protocol and I have been consulted for admission. VB/28    Exam:ill appearing with deep rapid respiration in RD7, pale in appearance obvious mottled leg bilateral, lungs cta, deep rapid breathing, heart tachycardic, abdomen over all soft minimal tenderness, ext with mottled upper hands bilateral and most notable in lower extremities, bilateral radial pulses and bilateral femoral pulses, neuro he does move his legs and hands and follow commands and answer yes and no questions.     A/P:  DKA:   Reason for DKA: sounds like noncompliance.    (rule out infection, AMI,CVA, medication, thiazide, beta blocker,antipsychotic, steroids, pancreatitis)  Follow Q 1 hour accuchecks, serial chemistry and aggressive monitor and replace K, PO4, Mg as needed  Maintain insulin gtt until AG closes and HCO3 >18 or until beta-hydroxybutyrate negative  Check serum osm, ketones, hgA1c, vbg/abg  Fluid resus NS/LR  Consider starting long acting Insulin  Diabetic education    KYREE: likely due to hypovolemia  Maintain euvolemia and monitor fluid responsiveness (avoid NaCL and renal congestion)  MAP > 65 uses pressors or inotropic trial  Monitor urine output and I&O's  Avoid and  review nephrotoxin medication  Rule out post obstruction  Consider renal U/S if no renal images  U/a and CPK    Hyperkalemia: s/p rx in ER, serial monitor tele and BMP, place martínez and monitor urine output, if not responding may need urgent dialysis with his severe acidosis and hyperglycemia this should trend down.     Mottled lower extremities: unclear if this is baseline LV function is preserved no shock, urgent CTA lower ext rule out acute vascular insufficiency ? Chronic, is able to move his extremities fine, pulses in bilateral femoral arteries.     Transaminase: serial monitor avoid hepatoxins, check cpk, hepatitis panel. Follow up CT scan abdomen.     Pseudohyponatremia with mild hyponatremia: serial monitor sodium corrects to 130 with glucose, with severe low CL of 77. Check TSH. IVF fluids.     Lactate acidosis: serial monitor    Leukocytosis: low threshold to start antibiotics.     Encephalopathy: due to metabolic abnormalities, avoid sedatives, serial neuro exams, follow up UDS, TSH, ammonia, phos.     Patient remains in critical condition from severe DKA and hyperkalemia bolus IVF and insulin gtt. Critical care time provided was 110 minutes. This excludes all separate billable procedures.     Please see [facility] notes for additional documentation    Sean Jennings MD  Critical Care Medicine

## 2025-03-03 NOTE — ED NOTES
Rebekah from Lab called with critical result of Glucose: 710  CO2: 2  Potassium: 7.3 at 1950. Critical lab result read back to Rebekah.   Dr. Bradford notified of critical lab result at 1950.

## 2025-03-03 NOTE — ED PROVIDER NOTES
"      ED Provider Note    CHIEF COMPLAINT  Chief Complaint   Patient presents with    High Blood Sugar     Pt BIB EMS. Per report pt has not had insulin for 3 days, BG \"HIGH\" PTA. Pt with multiple recent GLF, no trauma noted, negative thinners.     Leg Swelling    GLF     LIMITATION TO HISTORY   Select: Altered mental status    HPI    Colton Prasad Jr. is a 54 y.o. male who presents to the Emergency Department for hyperglycemia onset prior to arrival. He has a history of diabetes and has not taken his insulin in three days. He does not know what happened to his insulin. He has been vomiting and has some abdominal pain, no diarrhea, fever, chills or urinary symptoms. He denies any recent alcohol consumption.     OUTSIDE HISTORIAN(S):  Select: None    PAST MEDICAL HISTORY  Past Medical History:   Diagnosis Date    Diabetes     IVDA (Intravenous Drug Abus]        SURGICAL HISTORY  Past Surgical History:   Procedure Laterality Date    HIP NAILING INTRAMEDULLARY Left 12/23/2017    Procedure: HIP NAILING INTRAMEDULLARY;  Surgeon: Brody King M.D.;  Location: SURGERY [facility];  Service: Orthopedics       FAMILY HISTORY  Family History   Problem Relation Age of Onset    Lung Disease Neg Hx     Cancer Neg Hx     Diabetes Neg Hx     Heart Disease Neg Hx     Stroke Neg Hx         SOCIAL HISTORY  Social History     Socioeconomic History    Marital status: Single     Spouse name: Not on file    Number of children: Not on file    Years of education: Not on file    Highest education level: Not on file   Occupational History    Not on file   Tobacco Use    Smoking status: Former    Smokeless tobacco: Current     Types: Chew    Tobacco comments:     quit >20 years ago. 14 pack years hx   Substance and Sexual Activity    Alcohol use: No     Comment: occassional    Drug use: Yes     Types: Marijuana, Methamphetamines     Comment: \"occassional\"    Sexual activity: Not on file   Other Topics Concern    Not on file " "  Social History Narrative    Not on file     Social Drivers of Health     Financial Resource Strain: Not on file   Food Insecurity: Patient Declined (3/2/2025)    Hunger Vital Sign     Worried About Running Out of Food in the Last Year: Patient declined     Ran Out of Food in the Last Year: Patient declined   Transportation Needs: Patient Declined (3/2/2025)    PRAPARE - Transportation     Lack of Transportation (Medical): Patient declined     Lack of Transportation (Non-Medical): Patient declined   Physical Activity: Not on file   Stress: Not on file   Social Connections: Not on file   Intimate Partner Violence: Not At Risk (3/2/2025)    Humiliation, Afraid, Rape, and Kick questionnaire     Fear of Current or Ex-Partner: No     Emotionally Abused: No     Physically Abused: No     Sexually Abused: No   Housing Stability: Patient Declined (3/2/2025)    Housing Stability Vital Sign     Unable to Pay for Housing in the Last Year: Patient declined     Number of Times Moved in the Last Year: 0     Homeless in the Last Year: Patient declined       CURRENT MEDICATIONS  No current facility-administered medications on file prior to encounter.     No current outpatient medications on file prior to encounter.       ALLERGIES  No Active Allergies      PHYSICAL EXAM  VITAL SIGNS:/55   Pulse 96   Temp 36.3 °C (97.4 °F) (Temporal)   Resp (!) 33   Ht 1.854 m (6' 1\")   Wt 72.6 kg (160 lb)   SpO2 99%   BMI 21.11 kg/m²       GENERAL: Lethargic, difficult to arouse.   HEAD: Normocephalic and atraumatic  NECK: Normal range of motion, without meningismus  EYES: Pupils Equal, Round, Reactive to Light, extraocular movements intact, conjunctiva white  ENT: Mucous membranes moist, oropharynx clear  PULMONARY: Kussmaul breathing, clear to auscultation  CARDIOVASCULAR: No murmurs, clicks or rubs, peripheral pulses 2+. Tachycardic   ABDOMINAL: Soft, generalized abdominal tenderness, no guarding or rigidity present, no pulsatile " masses  BACK: no midline tenderness, no costovertebral tenderness  NEUROLOGICAL: somonolent Grossly non-focal neurological examination,   EXTREMITIES: Pitting edema bilaterally to shins, normal to inspection,  extremities cool to touch  SKIN: Warm and dry.  PSYCHIATRIC: Affect is appropriate    DIAGNOSTIC STUDIES / PROCEDURES  EKG  I have independently interpreted this EKG  Results for orders placed or performed during the hospital encounter of 25   EKG   Result Value Ref Range    Report       [facility] Emergency Dept.    Test Date:  2025  Pt Name:    OLIVIER BAJWA              Department: ER  MRN:        [id number]                      Room:       [facility]  Gender:     Male                         Technician: [id number]  :        1970                   Requested By:ER TRIAGE PROTOCOL  Order #:    [id number]                    Reading MD: Dov Bradford    Measurements  Intervals                                Axis  Rate:       94                           P:          79  AR:         191                          QRS:        -74  QRSD:       131                          T:          84  QT:         382  QTc:        478    Interpretive Statements  Sinus rhythm  Ventricular premature complex  Aberrant conduction of SV complex(es)  Probable left atrial enlargement  Left bundle branch block  Compared to ECG 2018 17:07:42  Ventricular premature complex(es) now present  Aberrant conduction of supraventricular beat(s) now present  Left bundle-branch blo ck now present  Electronically Signed On 2025 17:28:16 PST by Dov Bradford        LABS  Labs Reviewed   CBC WITH DIFFERENTIAL - Abnormal; Notable for the following components:       Result Value    WBC 18.7 (*)     RBC 3.96 (*)     Hemoglobin 11.0 (*)     Hematocrit 36.9 (*)     MCHC 29.8 (*)     Neutrophils-Polys 87.90 (*)     Lymphocytes 3.40 (*)     Neutrophils (Absolute) 16.44 (*)     Lymphs (Absolute) 0.64 (*)     Monos  (Absolute) 1.46 (*)     All other components within normal limits   COMP METABOLIC PANEL - Abnormal; Notable for the following components:    Sodium 120 (*)     Potassium 7.3 (*)     Chloride 77 (*)     Co2 2 (*)     Anion Gap 41.0 (*)     Glucose 710 (*)     Bun 48 (*)     Creatinine 1.55 (*)     Calcium 8.1 (*)     AST(SGOT) 102 (*)     ALT(SGPT) 177 (*)     Alkaline Phosphatase 277 (*)     All other components within normal limits   URINALYSIS - Abnormal; Notable for the following components:    Glucose >=1000 (*)     Ketones 80 (*)     Protein 30 (*)     All other components within normal limits   VENOUS BLOOD GAS - Abnormal; Notable for the following components:    Venous Bg Ph 6.92 (*)     Venous Bg Ph Temp Corrected 6.93 (*)     Venous Bg Pco2 16.9 (*)     Venous Bg Pco2 Temp Corrected 16.4 (*)     Venous Bg Po2 62.2 (*)     Venous Bg Po2 Temp Corrected 59.3 (*)     Venous Bg Hco3 3 (*)     Venous Bg Base Excess -28 (*)     All other components within normal limits   LACTIC ACID - Abnormal; Notable for the following components:    Lactic Acid 3.3 (*)     All other components within normal limits   PROBRAIN NATRIURETIC PEPTIDE, NT - Abnormal; Notable for the following components:    NT-proBNP 1921 (*)     All other components within normal limits   BETA-HYDROXYBUTYRIC ACID - Abnormal; Notable for the following components:    beta-Hydroxybutyric Acid >9.00 (*)     All other components within normal limits   ESTIMATED GFR - Abnormal; Notable for the following components:    GFR (CKD-EPI) 53 (*)     All other components within normal limits   CREATINE KINASE - Abnormal; Notable for the following components:    CPK Total 188 (*)     All other components within normal limits   HEMOGLOBIN A1C - Abnormal; Notable for the following components:    Glycohemoglobin 15.3 (*)     All other components within normal limits   URINE DRUG SCREEN - Abnormal; Notable for the following components:    Amphetamines Urine Positive  (*)     All other components within normal limits   BASIC METABOLIC PANEL - Abnormal; Notable for the following components:    Sodium 127 (*)     Chloride 84 (*)     Co2 5 (*)     Glucose 443 (*)     Bun 46 (*)     Creatinine 1.44 (*)     Calcium 7.9 (*)     Anion Gap 38.0 (*)     All other components within normal limits   AMMONIA - Abnormal; Notable for the following components:    Ammonia 51 (*)     All other components within normal limits   CBC WITH DIFFERENTIAL - Abnormal; Notable for the following components:    WBC 20.8 (*)     RBC 3.90 (*)     Hemoglobin 10.9 (*)     Hematocrit 36.5 (*)     MCHC 29.9 (*)     Neutrophils-Polys 79.70 (*)     Lymphocytes 7.20 (*)     Immature Granulocytes 1.30 (*)     Neutrophils (Absolute) 16.56 (*)     Monos (Absolute) 2.36 (*)     Immature Granulocytes (abs) 0.26 (*)     All other components within normal limits   COMP METABOLIC PANEL - Abnormal; Notable for the following components:    Sodium 124 (*)     Potassium 6.0 (*)     Chloride 80 (*)     Co2 3 (*)     Anion Gap 41.0 (*)     Glucose 647 (*)     Bun 49 (*)     Creatinine 1.46 (*)     Calcium 8.2 (*)     AST(SGOT) 89 (*)     ALT(SGPT) 167 (*)     Alkaline Phosphatase 269 (*)     Albumin 3.1 (*)     Total Protein 5.9 (*)     All other components within normal limits   PHOSPHORUS - Abnormal; Notable for the following components:    Phosphorus 7.2 (*)     All other components within normal limits   BLOOD GLUCOSE - Abnormal; Notable for the following components:    Glucose 584 (*)     All other components within normal limits   ESTIMATED GFR - Abnormal; Notable for the following components:    GFR (CKD-EPI) 56 (*)     All other components within normal limits   CBC WITH DIFFERENTIAL - Abnormal; Notable for the following components:    WBC 17.0 (*)     RBC 3.78 (*)     Hemoglobin 10.8 (*)     Hematocrit 34.3 (*)     MCHC 31.5 (*)     Neutrophils-Polys 80.80 (*)     Lymphocytes 8.00 (*)     Immature Granulocytes 1.40 (*)      Neutrophils (Absolute) 13.77 (*)     Monos (Absolute) 1.55 (*)     Immature Granulocytes (abs) 0.24 (*)     All other components within normal limits   BASIC METABOLIC PANEL - Abnormal; Notable for the following components:    Sodium 131 (*)     Chloride 94 (*)     Co2 14 (*)     Glucose 249 (*)     Bun 41 (*)     Calcium 8.1 (*)     Anion Gap 23.0 (*)     All other components within normal limits   ESTIMATED GFR - Abnormal; Notable for the following components:    GFR (CKD-EPI) 57 (*)     All other components within normal limits   BASIC METABOLIC PANEL - Abnormal; Notable for the following components:    Sodium 134 (*)     Glucose 206 (*)     Bun 39 (*)     Calcium 8.1 (*)     All other components within normal limits   HEPATIC FUNCTION PANEL - Abnormal; Notable for the following components:    Alkaline Phosphatase 213 (*)     AST(SGOT) 66 (*)     ALT(SGPT) 136 (*)     Albumin 2.8 (*)     Total Protein 5.3 (*)     All other components within normal limits   PROCALCITONIN - Abnormal; Notable for the following components:    Procalcitonin 2.68 (*)     All other components within normal limits   BASIC METABOLIC PANEL - Abnormal; Notable for the following components:    Glucose 115 (*)     Bun 34 (*)     Calcium 8.3 (*)     All other components within normal limits   PHOSPHORUS - Abnormal; Notable for the following components:    Phosphorus 1.8 (*)     All other components within normal limits   ACETAMINOPHEN - Abnormal; Notable for the following components:    Acetaminophen -Tylenol <5.0 (*)     All other components within normal limits   POCT GLUCOSE DEVICE RESULTS - Abnormal; Notable for the following components:    POC Glucose, Blood >600 (*)     All other components within normal limits   POCT GLUCOSE DEVICE RESULTS - Abnormal; Notable for the following components:    POC Glucose, Blood >600 (*)     All other components within normal limits   POCT GLUCOSE DEVICE RESULTS - Abnormal; Notable for the following  components:    POC Glucose, Blood 572 (*)     All other components within normal limits   POCT GLUCOSE DEVICE RESULTS - Abnormal; Notable for the following components:    POC Glucose, Blood >600 (*)     All other components within normal limits   POCT GLUCOSE DEVICE RESULTS - Abnormal; Notable for the following components:    POC Glucose, Blood 521 (*)     All other components within normal limits   POCT GLUCOSE DEVICE RESULTS - Abnormal; Notable for the following components:    POC Glucose, Blood 487 (*)     All other components within normal limits   POCT GLUCOSE DEVICE RESULTS - Abnormal; Notable for the following components:    POC Glucose, Blood 393 (*)     All other components within normal limits   POCT GLUCOSE DEVICE RESULTS - Abnormal; Notable for the following components:    POC Glucose, Blood 369 (*)     All other components within normal limits   POCT GLUCOSE DEVICE RESULTS - Abnormal; Notable for the following components:    POC Glucose, Blood 306 (*)     All other components within normal limits   POCT GLUCOSE DEVICE RESULTS - Abnormal; Notable for the following components:    POC Glucose, Blood 309 (*)     All other components within normal limits   POCT GLUCOSE DEVICE RESULTS - Abnormal; Notable for the following components:    POC Glucose, Blood 225 (*)     All other components within normal limits   POCT GLUCOSE DEVICE RESULTS - Abnormal; Notable for the following components:    POC Glucose, Blood 220 (*)     All other components within normal limits   POCT GLUCOSE DEVICE RESULTS - Abnormal; Notable for the following components:    POC Glucose, Blood 206 (*)     All other components within normal limits   POCT GLUCOSE DEVICE RESULTS - Abnormal; Notable for the following components:    POC Glucose, Blood 186 (*)     All other components within normal limits   POCT GLUCOSE DEVICE RESULTS - Abnormal; Notable for the following components:    POC Glucose, Blood 191 (*)     All other components within  normal limits   POCT GLUCOSE DEVICE RESULTS - Abnormal; Notable for the following components:    POC Glucose, Blood 181 (*)     All other components within normal limits   POCT GLUCOSE DEVICE RESULTS - Abnormal; Notable for the following components:    POC Glucose, Blood 160 (*)     All other components within normal limits   POCT GLUCOSE DEVICE RESULTS - Abnormal; Notable for the following components:    POC Glucose, Blood 150 (*)     All other components within normal limits   DIFFERENTIAL MANUAL   PERIPHERAL SMEAR REVIEW   PLATELET ESTIMATE   MORPHOLOGY   BLOOD CULTURE   BLOOD CULTURE   HEPATITIS PANEL ACUTE(4 COMPONENTS)   TSH WITH REFLEX TO FT4   MAGNESIUM   PHOSPHORUS   MAGNESIUM   URINE MICROSCOPIC (W/UA)   ESTIMATED GFR   COV-2, FLU A/B, AND RSV BY PCR (voxapp)   MAGNESIUM   ESTIMATED GFR   PROTHROMBIN TIME   ESTIMATED GFR   POCT GLUCOSE     All labs reviewed by me.     RADIOLOGY  I have independently interpreted the diagnostic imaging associated with this visit and am waiting the final reading from the radiologist.   My preliminary interpretation is as follows: no pneumonia  Formal Radiologist interpretation:  US-DARLENE SINGLE LEVEL BILAT   Final Result      CT-ABDOMEN-PELVIS WITH   Final Result      1.  Wall thickening of the proximal duodenum suggesting duodenitis/ulcer disease.   2.  No bowel obstruction or perforation.   3.  Stomach is distended with fluid.   4.  Small hiatal hernia with wall thickening, suggesting inflammation.         CT-CTA LOWER EXT WITH & W/O-POST PROCESS RIGHT   Final Result      1.  No high-grade RIGHT lower extremity arterial stenosis.  Potential occlusion of distal peroneal artery.  Two-vessel runoff at the ankle.   2.  Subcutaneous edema of the lower leg and ankle.   3.  No soft tissue abscess or gas.   4.  Muscle atrophy.      CT-CTA LOWER EXT WITH & W/O-POST PROCESS LEFT   Final Result   Addendum (preliminary) 1 of 1   Peroneal artery is not well visualized distally.   Two-vessel runoff at the    ankle.      Final      1.  Diffuse soft tissues edema of LEFT lower extremity, worst in the lower leg and ankle.   2.  No abscess or soft tissue gas.   3.  No gross arterial stenosis or occlusion.   4.  Muscle atrophy.      DX-CHEST-LIMITED (1 VIEW)   Final Result      No acute cardiopulmonary disease.        COURSE & MEDICAL DECISION MAKING    ED COURSE:    INTERVENTIONS BY ME:  Medications   D10 1/2 NS infusion (0 mL Intravenous Stopped 3/3/25 1107)   insulin regular (HumuLIN R/NovoLIN R) 100 Units in  mL infusion (DKA) (0 Units/hr Intravenous Stopped. (Insulin or Heparin) 3/3/25 1148)   ondansetron (Zofran) syringe/vial injection 4 mg (4 mg Intravenous Given 3/3/25 0715)   thiamine (B-1) injection 200 mg (200 mg Intravenous Given 3/3/25 0608)   insulin GLARGINE (Lantus,Semglee) injection (25 Units Subcutaneous Given 3/3/25 0937)   insulin lispro (HumaLOG,AdmeLOG) subcutaneous injection (0 Units Subcutaneous Held 3/3/25 1100)     And   dextrose 50% (D50W) injection 25 g (has no administration in time range)   ceFAZolin (Ancef) 2 g in  mL IVPB (0 g Intravenous Stopped 3/3/25 1355)   omeprazole (PriLOSEC) capsule 20 mg (has no administration in time range)   rivaroxaban (Xarelto) tablet 10 mg (has no administration in time range)   lactated ringers infusion (has no administration in time range)   Bolus of LR (0 mL Intravenous Stopped 3/2/25 2000)   ondansetron (Zofran) syringe/vial injection 4 mg (4 mg Intravenous Given 3/2/25 1926)   insulin regular (HumuLIN R/NovoLIN R) 1 unit/mL syringe (IV ONLY) 10 Units (10 Units Intravenous Given 3/2/25 2016)   Bolus of LR (0 mL Intravenous Stopped 3/2/25 2100)   calcium GLUConate 2 g in NaCl IVPB premix (0 g Intravenous Stopped 3/2/25 2214)   sodium bicarbonate 8.4 % injection 50 mEq (50 mEq Intravenous Given 3/2/25 2018)   LR (Bolus) infusion 2,000 mL (0 mL Intravenous Stopped 3/2/25 2300)   magnesium sulfate IVPB premix 2 g (0 g  Intravenous Stopped 3/3/25 0733)     Or   magnesium sulfate IVPB premix 4 g ( Intravenous See Alternative 3/3/25 0733)   LR (Bolus) infusion 1,000 mL (0 mL Intravenous Stopped 3/3/25 0045)   iohexol (OMNIPAQUE) 350 mg/mL (IV) (100 mL Intravenous Given 3/2/25 2215)   potassium chloride (KCL) ivpb 10 mEq (0 mEq Intravenous Stopped 3/3/25 0211)   calcium GLUConate 1 g in NaCl IVPB premix (0 g Intravenous Stopped 3/3/25 0544)   potassium chloride (KCL) ivpb 10 mEq (0 mEq Intravenous Stopped 3/3/25 0711)   potassium chloride (KCL) ivpb 10 mEq (0 mEq Intravenous Stopped 3/3/25 0959)     Response on recheck:    7:11 PM - Patient seen and examined at bedside. He presents for hyperglycemia and vomiting, is a diabetic and has not taken his insulin in three days. Labs, imaging and EKG ordered, given Zofran for nausea. Fluid bolus initiated.     7:53 PM - Labs have returned, remarkable for potassium of 7.3, bicarb of 2, acidosis. Will give 10 units of insulin, plan for admission to ICU for DKA.     8:09 PM I discussed the patient's case and the above findings with Dr. Jennings (Intensivist) who is agreeable to admission, recommends martínez cath placement.      CRITICAL CARE  The very real possibilty of a deterioration of this patient's condition required the highest level of my preparedness for sudden, emergent intervention.  I provided critical care services, which included medication orders, frequent reevaluations of the patient's condition and response to treatment, ordering and reviewing test results, and discussing the case with various consultants.  The critical care time associated with the care of the patient was 42 minutes. Review chart for interventions. This time is exclusive of any other billable procedures.      INITIAL ASSESSMENT, COURSE AND PLAN  Care Narrative:     ED OBS: No; Patient does not meet criteria for ED Observation.   Hydration: Based on the patient's presentation of Acute Vomiting and Hyperglycemia the  patient was given IV fluids. IV Hydration was used because oral hydration was not adequate alone. Upon recheck following hydration, the patient was improved.    This patient is a 54-year-old male with a history of diabetes and intravenous drug use, presenting with profound hyperglycemia, metabolic acidosis, and altered mental status concerning for diabetic ketoacidosis (DKA). His initial laboratory findings demonstrate severe acidosis with a venous pH of 6.92, an anion gap of 41, glucose of 710, and critical hyperkalemia at 7.3. He exhibits signs of systemic illness, including tachypnea with Kussmaul respirations, tachycardia, and lethargy, raising concern for ongoing catecholamine surge and occult shock. Additionally, the presence of bilateral lower extremity edema and a markedly elevated NT-proBNP suggests volume overload, potentially secondary to underlying cardiac dysfunction or acute kidney injury, as evidenced by elevated BUN and creatinine.    The primary emergent concerns in this patient include severe DKA with associated hyperkalemia, which poses a significant risk for fatal arrhythmias. Secondary considerations include occult sepsis, given the leukocytosis and elevated procalcitonin, and a possible ischemic or embolic event, given his history of IV drug use. However, stroke is less likely given the absence of focal deficits. Imaging findings of duodenitis and gastric distension may suggest a gastrointestinal component, though there is no clear evidence of obstruction or perforation.    Management has focused on aggressive fluid resuscitation with lactated Ringer’s, insulin therapy for hyperglycemia and acidosis, and electrolyte correction, including administration of calcium gluconate and sodium bicarbonate for cardioprotection against hyperkalemia. The patient’s response to therapy was closely monitored, with a planned ICU admission for continued management of metabolic derangements and further assessment  of his hemodynamic status.    Given the severity of his presentation and the risk for deterioration, he will be admitted under the intensivist’s care. His condition remains guarded, requiring ongoing monitoring for worsening acidosis, electrolyte disturbances, and potential cardiovascular compromise.    ADDITIONAL PROBLEM LIST      DISPOSITION AND DISCUSSIONS  Discussion of management with other [facility] or appropriate source(s): Dr. Jennings (Intensivist)     I have discussed management of the patient with the following physicians and BIBI's: None    Barriers to care at this time, including but not limited to: None    Decision tools and prescription drugs considered including, but not limited to:     DISPOSITION:  Patient will be hospitalized by Dr. Jennings in guarded condition.      FINAL DIAGNOSIS  1. Diabetic ketoacidosis without coma associated with type 2 diabetes mellitus (HCC)    2. Altered mental status, unspecified altered mental status type    3. Acute kidney injury (HCC)    4. Hyperkalemia        Johnny SAMS (Lesa), am scribing for, and in the presence of, Dov Bradford.    Electronically signed by: Johnny Olmstead (Lesa), 3/2/2025    IDov personally performed the services described in this documentation, as scribed by Johnny Olmstead in my presence, and it is both accurate and complete.     Electronically signed by: Dov Bradford DO ,5:28 PM 03/02/25

## 2025-03-03 NOTE — ASSESSMENT & PLAN NOTE
Likely related to DKA  S/p hyperkalemia protocol with insulin in the ED  -- Serial monitoring BMP  -- Monitor on telemetry  -- Strict I's and O's with Medley placement  -- If no urine output may need urgent dialysis

## 2025-03-03 NOTE — ASSESSMENT & PLAN NOTE
Likely due to hypovolemia  --Maintain MAP greater than 65  -- Rule out post obstruction CT abdomen pelvis  -- Pending UA and CPK

## 2025-03-03 NOTE — ED NOTES
Bedside report to Tre RN.  POC discussed with patient. Call light within reach, all needs addressed at this time.         Fall risk interventions in place: In place (all applicable per [facility] Fall risk assessment)   Continuous monitoring: Cardiac Leads, Pulse Ox, or Blood Pressure  IVF/IV medications: Not Applicable   Oxygen: Room Air  Bedside sitter: Not Applicable   Isolation: Not Applicable

## 2025-03-04 ENCOUNTER — APPOINTMENT (OUTPATIENT)
Dept: RADIOLOGY | Facility: MEDICAL CENTER | Age: 55
DRG: 637 | End: 2025-03-04
Attending: INTERNAL MEDICINE
Payer: MEDICAID

## 2025-03-04 PROBLEM — R50.9 FEVER: Status: ACTIVE | Noted: 2025-03-04

## 2025-03-04 LAB
ALBUMIN SERPL BCP-MCNC: 2.7 G/DL (ref 3.2–4.9)
ALBUMIN/GLOB SERPL: 0.9 G/DL
ALP SERPL-CCNC: 190 U/L (ref 30–99)
ALT SERPL-CCNC: 96 U/L (ref 2–50)
ANION GAP SERPL CALC-SCNC: 11 MMOL/L (ref 7–16)
APPEARANCE UR: CLEAR
AST SERPL-CCNC: 44 U/L (ref 12–45)
BACTERIA #/AREA URNS HPF: NORMAL /HPF
BILIRUB SERPL-MCNC: 0.2 MG/DL (ref 0.1–1.5)
BILIRUB UR QL STRIP.AUTO: NEGATIVE
BUN SERPL-MCNC: 24 MG/DL (ref 8–22)
CALCIUM ALBUM COR SERPL-MCNC: 9.1 MG/DL (ref 8.5–10.5)
CALCIUM SERPL-MCNC: 8.1 MG/DL (ref 8.5–10.5)
CASTS URNS QL MICRO: NORMAL /LPF (ref 0–2)
CHLORIDE SERPL-SCNC: 102 MMOL/L (ref 96–112)
CO2 SERPL-SCNC: 25 MMOL/L (ref 20–33)
COLOR UR: YELLOW
CREAT SERPL-MCNC: 0.82 MG/DL (ref 0.5–1.4)
EPITHELIAL CELLS 1715: NORMAL /HPF (ref 0–5)
GFR SERPLBLD CREATININE-BSD FMLA CKD-EPI: 104 ML/MIN/1.73 M 2
GLOBULIN SER CALC-MCNC: 2.9 G/DL (ref 1.9–3.5)
GLUCOSE BLD STRIP.AUTO-MCNC: 231 MG/DL (ref 65–99)
GLUCOSE BLD STRIP.AUTO-MCNC: 244 MG/DL (ref 65–99)
GLUCOSE BLD STRIP.AUTO-MCNC: 272 MG/DL (ref 65–99)
GLUCOSE BLD STRIP.AUTO-MCNC: 326 MG/DL (ref 65–99)
GLUCOSE SERPL-MCNC: 165 MG/DL (ref 65–99)
GLUCOSE UR STRIP.AUTO-MCNC: >=1000 MG/DL
KETONES UR STRIP.AUTO-MCNC: ABNORMAL MG/DL
LEUKOCYTE ESTERASE UR QL STRIP.AUTO: NEGATIVE
MAGNESIUM SERPL-MCNC: 2.3 MG/DL (ref 1.5–2.5)
MICRO URNS: ABNORMAL
NITRITE UR QL STRIP.AUTO: NEGATIVE
PH UR STRIP.AUTO: 6.5 [PH] (ref 5–8)
PHOSPHATE SERPL-MCNC: 1.8 MG/DL (ref 2.5–4.5)
POTASSIUM SERPL-SCNC: 4.3 MMOL/L (ref 3.6–5.5)
PROCALCITONIN SERPL-MCNC: 1.65 NG/ML
PROT SERPL-MCNC: 5.6 G/DL (ref 6–8.2)
PROT UR QL STRIP: 30 MG/DL
RBC # URNS HPF: NORMAL /HPF (ref 0–2)
RBC UR QL AUTO: NEGATIVE
SODIUM SERPL-SCNC: 138 MMOL/L (ref 135–145)
SP GR UR STRIP.AUTO: 1.01
UROBILINOGEN UR STRIP.AUTO-MCNC: 0.2 EU/DL
WBC #/AREA URNS HPF: NORMAL /HPF

## 2025-03-04 PROCEDURE — 700105 HCHG RX REV CODE 258: Performed by: EMERGENCY MEDICINE

## 2025-03-04 PROCEDURE — 82962 GLUCOSE BLOOD TEST: CPT

## 2025-03-04 PROCEDURE — A9270 NON-COVERED ITEM OR SERVICE: HCPCS | Performed by: HOSPITALIST

## 2025-03-04 PROCEDURE — 87086 URINE CULTURE/COLONY COUNT: CPT

## 2025-03-04 PROCEDURE — 700102 HCHG RX REV CODE 250 W/ 637 OVERRIDE(OP): Performed by: HOSPITALIST

## 2025-03-04 PROCEDURE — 99233 SBSQ HOSP IP/OBS HIGH 50: CPT | Performed by: INTERNAL MEDICINE

## 2025-03-04 PROCEDURE — 71045 X-RAY EXAM CHEST 1 VIEW: CPT

## 2025-03-04 PROCEDURE — 80053 COMPREHEN METABOLIC PANEL: CPT

## 2025-03-04 PROCEDURE — 81001 URINALYSIS AUTO W/SCOPE: CPT

## 2025-03-04 PROCEDURE — 770006 HCHG ROOM/CARE - MED/SURG/GYN SEMI*

## 2025-03-04 PROCEDURE — 700102 HCHG RX REV CODE 250 W/ 637 OVERRIDE(OP): Performed by: INTERNAL MEDICINE

## 2025-03-04 PROCEDURE — A9270 NON-COVERED ITEM OR SERVICE: HCPCS | Performed by: INTERNAL MEDICINE

## 2025-03-04 PROCEDURE — 700102 HCHG RX REV CODE 250 W/ 637 OVERRIDE(OP): Performed by: EMERGENCY MEDICINE

## 2025-03-04 PROCEDURE — 700111 HCHG RX REV CODE 636 W/ 250 OVERRIDE (IP): Performed by: EMERGENCY MEDICINE

## 2025-03-04 PROCEDURE — 84145 PROCALCITONIN (PCT): CPT

## 2025-03-04 PROCEDURE — 83735 ASSAY OF MAGNESIUM: CPT

## 2025-03-04 PROCEDURE — 84100 ASSAY OF PHOSPHORUS: CPT

## 2025-03-04 RX ORDER — CEPHALEXIN 500 MG/1
500 CAPSULE ORAL EVERY 8 HOURS
Status: DISCONTINUED | OUTPATIENT
Start: 2025-03-04 | End: 2025-03-05 | Stop reason: HOSPADM

## 2025-03-04 RX ORDER — GAUZE BANDAGE 2" X 2"
200 BANDAGE TOPICAL 2 TIMES DAILY
Status: DISCONTINUED | OUTPATIENT
Start: 2025-03-04 | End: 2025-03-05 | Stop reason: HOSPADM

## 2025-03-04 RX ORDER — ACETAMINOPHEN 325 MG/1
650 TABLET ORAL EVERY 4 HOURS PRN
Status: DISCONTINUED | OUTPATIENT
Start: 2025-03-04 | End: 2025-03-05 | Stop reason: HOSPADM

## 2025-03-04 RX ADMIN — INSULIN LISPRO 3 UNITS: 100 INJECTION, SOLUTION INTRAVENOUS; SUBCUTANEOUS at 22:12

## 2025-03-04 RX ADMIN — OMEPRAZOLE 20 MG: 20 CAPSULE, DELAYED RELEASE ORAL at 05:54

## 2025-03-04 RX ADMIN — INSULIN LISPRO 6 UNITS: 100 INJECTION, SOLUTION INTRAVENOUS; SUBCUTANEOUS at 08:48

## 2025-03-04 RX ADMIN — Medication 200 MG: at 17:32

## 2025-03-04 RX ADMIN — ACETAMINOPHEN 650 MG: 325 TABLET ORAL at 12:53

## 2025-03-04 RX ADMIN — CEPHALEXIN 500 MG: 500 CAPSULE ORAL at 22:04

## 2025-03-04 RX ADMIN — DIBASIC SODIUM PHOSPHATE, MONOBASIC POTASSIUM PHOSPHATE AND MONOBASIC SODIUM PHOSPHATE 500 MG: 852; 155; 130 TABLET ORAL at 17:33

## 2025-03-04 RX ADMIN — RIVAROXABAN 10 MG: 10 TABLET, FILM COATED ORAL at 17:33

## 2025-03-04 RX ADMIN — DIBASIC SODIUM PHOSPHATE, MONOBASIC POTASSIUM PHOSPHATE AND MONOBASIC SODIUM PHOSPHATE 500 MG: 852; 155; 130 TABLET ORAL at 12:53

## 2025-03-04 RX ADMIN — CEPHALEXIN 500 MG: 500 CAPSULE ORAL at 12:53

## 2025-03-04 RX ADMIN — INSULIN GLARGINE-YFGN 15 UNITS: 100 INJECTION, SOLUTION SUBCUTANEOUS at 08:49

## 2025-03-04 RX ADMIN — CEFAZOLIN 2 G: 2 INJECTION, POWDER, FOR SOLUTION INTRAMUSCULAR; INTRAVENOUS at 05:54

## 2025-03-04 RX ADMIN — INSULIN LISPRO 3 UNITS: 100 INJECTION, SOLUTION INTRAVENOUS; SUBCUTANEOUS at 17:32

## 2025-03-04 RX ADMIN — INSULIN LISPRO 5 UNITS: 100 INJECTION, SOLUTION INTRAVENOUS; SUBCUTANEOUS at 11:44

## 2025-03-04 RX ADMIN — THIAMINE HYDROCHLORIDE 200 MG: 100 INJECTION, SOLUTION INTRAMUSCULAR; INTRAVENOUS at 05:50

## 2025-03-04 ASSESSMENT — COGNITIVE AND FUNCTIONAL STATUS - GENERAL
MOVING FROM LYING ON BACK TO SITTING ON SIDE OF FLAT BED: A LITTLE
SUGGESTED CMS G CODE MODIFIER DAILY ACTIVITY: CH
MOBILITY SCORE: 20
MOVING TO AND FROM BED TO CHAIR: A LITTLE
SUGGESTED CMS G CODE MODIFIER MOBILITY: CJ
CLIMB 3 TO 5 STEPS WITH RAILING: A LITTLE
DAILY ACTIVITIY SCORE: 24
WALKING IN HOSPITAL ROOM: A LITTLE

## 2025-03-04 ASSESSMENT — PAIN DESCRIPTION - PAIN TYPE
TYPE: ACUTE PAIN

## 2025-03-04 ASSESSMENT — ENCOUNTER SYMPTOMS
DIAPHORESIS: 1
ABDOMINAL PAIN: 0
WEAKNESS: 1
COUGH: 1
FEVER: 1

## 2025-03-04 NOTE — CONSULTS
Hospital Medicine Consultation    Date of Service  3/3/2025    Referring Physician  Adan Basilio M.D.    Consulting Physician  Yoni Diez M.D.    Reason for Consultation  Hospital medicine consultation requested in a patient with with a DKA    History of Presenting Illness  54 y.o. male who presented 3/2/2025 with a past med history of diabetes insulin requiring, dyslipidemia, depression, cervical radiculopathy, per report the patient presents to the emergency department with tachycardia, Kussmaul breathing, hypoglycemia, reporting that he ran out of his insulin for approximately 3 days, the patient was volume resuscitated in the emergency room, the patient found to be in severe DKA with a ending up of 41, K of 7.3 and a bicarb of 2, glucose 710, KYREE, patient found with some concern for lower extremity perfusion, a CTA was performed shows no obstructing vascular disease, subsequently the patient admitted to the ICU level of care, there was some concern of lower extremity cellulitis, the patient placed on antibiotic therapy.  On 3/3 the patient improved, was able to take oral nutrition, transitioned off DKA protocol, and felt improved to transfer out of the ICU level of care  The patient continues to be a poor historian, he is poorly interactive, answers most questions with yes and no, cannot give me a reason why he ran out of insulin    Review of Systems  Review of Systems   Constitutional:  Positive for chills and malaise/fatigue. Negative for fever.   HENT: Negative.     Eyes: Negative.    Respiratory: Negative.     Cardiovascular: Negative.    Gastrointestinal:  Positive for nausea and vomiting.   Genitourinary: Negative.    Musculoskeletal: Negative.    Skin: Negative.    Neurological:  Positive for weakness.   Endo/Heme/Allergies: Negative.    Psychiatric/Behavioral:  The patient is nervous/anxious.    All other systems reviewed and are negative.      Past Medical History   has a past medical  history of Diabetes and IVDA (Intravenous Drug Abus].    He has no past medical history of CATARACT.    Surgical History   has a past surgical history that includes hip nailing intramedullary (Left, 12/23/2017).    Family History  Family history at this time is noncontributory    Social History   reports that he has quit smoking. His smokeless tobacco use includes chew. He reports current drug use. Drugs: Marijuana and Methamphetamines. He reports that he does not drink alcohol.    Medications  None       Allergies  No Active Allergies    Physical Exam  Temp:  [36.1 °C (97 °F)-36.3 °C (97.4 °F)] 36.1 °C (97 °F)  Pulse:  [] 97  Resp:  [15-33] 20  BP: (101-166)/(51-78) 132/60  SpO2:  [89 %-100 %] 96 %    Physical Exam  Vitals and nursing note reviewed.   Constitutional:       Appearance: He is well-developed. He is ill-appearing.   HENT:      Head: Normocephalic.   Eyes:      Pupils: Pupils are equal, round, and reactive to light.   Cardiovascular:      Rate and Rhythm: Normal rate and regular rhythm.      Heart sounds: Normal heart sounds.   Pulmonary:      Effort: Pulmonary effort is normal.   Abdominal:      General: Bowel sounds are normal.      Palpations: Abdomen is soft.   Genitourinary:     Penis: Normal.       Rectum: Normal.   Musculoskeletal:         General: Swelling present. Normal range of motion.      Cervical back: Normal range of motion.      Right lower leg: Edema present.      Left lower leg: Edema present.   Skin:     General: Skin is warm.      Coloration: Skin is pale.      Findings: Erythema present.   Neurological:      General: No focal deficit present.      Mental Status: He is alert and oriented to person, place, and time.   Psychiatric:      Comments: Flat affect, poor interaction         Fluids  Date 03/03/25 0700 - 03/04/25 0659   Shift 6320-5463 4758-9243 4765-6573 24 Hour Total   INTAKE   I.V. 970.1   970.1   IV Piggyback 98.4   98.4   Shift Total 1068.5   1068.5   OUTPUT   Urine  1450   1450   Shift Total 1450   1450   Weight (kg) 78 78 78 78       Laboratory  Recent Labs     03/02/25  1859 03/02/25  2057 03/02/25  2359   WBC 18.7* 20.8* 17.0*   RBC 3.96* 3.90* 3.78*   HEMOGLOBIN 11.0* 10.9* 10.8*   HEMATOCRIT 36.9* 36.5* 34.3*   MCV 93.2 93.6 90.7   MCH 27.8 27.9 28.6   MCHC 29.8* 29.9* 31.5*   RDW 45.7 45.1 43.3   PLATELETCT 322 306 260   MPV 11.0 11.0 10.4     Recent Labs     03/03/25  0406 03/03/25  0800 03/03/25  1146   SODIUM 131* 134* 135   POTASSIUM 4.7 4.5 4.2   CHLORIDE 94* 98 100   CO2 14* 21 25   GLUCOSE 249* 206* 115*   BUN 41* 39* 34*   CREATININE 1.21 1.18 0.95   CALCIUM 8.1* 8.1* 8.3*     Recent Labs     03/03/25  1321   INR 1.03                 Imaging  US-DARLENE SINGLE LEVEL BILAT   Final Result      CT-ABDOMEN-PELVIS WITH   Final Result      1.  Wall thickening of the proximal duodenum suggesting duodenitis/ulcer disease.   2.  No bowel obstruction or perforation.   3.  Stomach is distended with fluid.   4.  Small hiatal hernia with wall thickening, suggesting inflammation.         CT-CTA LOWER EXT WITH & W/O-POST PROCESS RIGHT   Final Result      1.  No high-grade RIGHT lower extremity arterial stenosis.  Potential occlusion of distal peroneal artery.  Two-vessel runoff at the ankle.   2.  Subcutaneous edema of the lower leg and ankle.   3.  No soft tissue abscess or gas.   4.  Muscle atrophy.      CT-CTA LOWER EXT WITH & W/O-POST PROCESS LEFT   Final Result   Addendum (preliminary) 1 of 1   Peroneal artery is not well visualized distally.  Two-vessel runoff at the    ankle.      Final      1.  Diffuse soft tissues edema of LEFT lower extremity, worst in the lower leg and ankle.   2.  No abscess or soft tissue gas.   3.  No gross arterial stenosis or occlusion.   4.  Muscle atrophy.      DX-CHEST-LIMITED (1 VIEW)   Final Result      No acute cardiopulmonary disease.          Assessment/Plan  * DKA, type 1, not at goal (HCC)- (present on admission)  Assessment & Plan  ICU  admission, cardiac monitoring  Likely inciting event states he ran out of his medications  A1C 15 indicating very poor compliance  Appears euvolemic on physical exam, normotensive  DKA protocol, s/p   Transition of protocol, Accu-Cheks ACHS  Goal Magnesium: >2, Phosphorus: 2, K >4    Diabetic teaching, evaluate circumstances leading to the patient's poor compliance and poor control    Substance abuse (HCC)  Assessment & Plan  Testing positive for methamphetamines    Noncompliance  Assessment & Plan  Very poor diabetic control, supposedly ran out of insulin  Diabetic teaching-consult  Evaluate for additional assistance for resources    Vascular insufficiency of extremity- (present on admission)  Assessment & Plan  RLE CTA with concern for potentially occluded peroneal artery but no evidence of high grade arterial stenosis of large vessel  Has pulses and decent CRT in foot  RLE arterial sono with multiphasic flow in all major arteries    No evidence of ongoing ischemic limb  Serial NV checks  Will potentially need antiplatelet agent and statin and risk modification/management    Lower extremity edema- (present on admission)  Assessment & Plan  Fairly symmetric, hyperemic , through with some right sided predominance  No compartment tenderness  Non suppurative  Given appearance and clinical circumstance going to treat empirically for possible cellulitis  CT of RLE shows some soft tissue subQ edema, no fluid collection or abscess  BLE arterial US with multiphasic flows and normal sonographic DARLENE on the left, DARLENE not obtained on right  No e/o deep space infection at this time    Cefazolin for now, can transition to keflex when taking orals, 7 day course    Acute metabolic encephalopathy- (present on admission)  Assessment & Plan  Due to DKA associated metabolic derangements  Non focal exam    NH3 WNL  TSH 2.2    Avoid deliriogenic medications  Follow neuro exam    Transaminitis- (present on admission)  Assessment &  Plan  Unclear etiology, CPK mildly elevated   CT A/P without evidence of biliary obstruction or cirrhosis  RUQUS with fatty live and no cholecystitis  Daily CMP  APAP and coags pending  Hepatitis panel negative    Hyperkalemia- (present on admission)  Assessment & Plan  Likely related to DKA and severe acidemia  Now resolved    Serial monitoring BMP  Monitor on telemetry  Strict I's and O's with Medley placement    KYREE (acute kidney injury) (HCC)- (present on admission)  Assessment & Plan  Likely due to hypovolemia and DKA  sCr improving with resuscitation    Maintain MAP greater than 65  CPK 180s  Non oliguric  I/Os    Plan  3/3  Transitioning the patient off DKA protocol  Ongoing antibiotics, monitor closely for ongoing infection  Diabetic teaching, consult  Maintain adequate hemodynamics  Monitor electrolytes, replace as indicated  Encourage substance abuse cessation  Close laboratory follow-up  See orders  Patient is has a high medical complexity, complex decision making and is at high risk for complication, morbidity, and mortality.  I spent 64 minutes, reviewing the chart, obtaining and/or reviewing separately obtained history. Performing a medically appropriate examination and evaluation.  Counseling and educating the patient. Ordering and reviewing medications, tests, or procedures.   Documenting clinical information in EPIC. Independently interpreting results and communicating results to patient. Discussing future disposition of care with patient, RN and case management.  Thank you for consulting with us, we will follow closely by the patient is hospitalized      Please note that this dictation was created using voice recognition software. I have made every reasonable attempt to correct obvious errors, but I expect that there are errors of grammar and possibly context that I did not discover before finalizing the note.

## 2025-03-04 NOTE — ASSESSMENT & PLAN NOTE
RLE CTA with concern for potentially occluded peroneal artery but no evidence of high grade arterial stenosis of large vessel  Has pulses and decent CRT in foot  RLE arterial sono with multiphasic flow in all major arteries  No evidence of ongoing ischemic limb

## 2025-03-04 NOTE — CARE PLAN
Group Topic:  [facility] Process Group    Date: 6/15/2020  Start Time: 1345  End Time: 1430  Facilitators: Marina Amezcua LPC    Focus: check out   Number in attendance: 10      Handouts: recovery preparedness  Method: Group  Attendance: Present  Mood/Affect: Appropriate  Behavior/Socialization: Appropriate to group   Participation: Active  Overall Patient Response to Group: Appropriate to topic  Individual Response to Group: Pt discussed his \"self sabotaging\" behaviors and stated, \"it's like I almost get high from falling apart.\" Pt discussed this with group and received supportive feedback.   Ability to Apply Content to Group:5      Marina Amezcua, MS, LPC, SAC-IT     The patient is Watcher - Medium risk of patient condition declining or worsening    Shift Goals  Clinical Goals: Pt will remain free from falls, monitor blood sugars throughout shift  Patient Goals: rest  Family Goals: LILI    Progress made toward(s) clinical / shift goals:      Pt remained free from falls throughout shift. Bed is locked and in lowest position. Call light and personal belongings are within reach. Bed alarm is on. Pt's blood sugars were monitored throughout shift with the use of FSBS checks.      Problem: Fall Risk  Goal: Patient will remain free from falls  Outcome: Progressing     Problem: Neuro Status  Goal: Neuro status will remain stable or improve  Outcome: Progressing     Problem: Pain - Standard  Goal: Alleviation of pain or a reduction in pain to the patient’s comfort goal  Outcome: Progressing       Patient is not progressing towards the following goals:

## 2025-03-04 NOTE — PROGRESS NOTES
Hypoglycemia Intervention    Hypoglycemia protocol intervention:  Blood glucose 29 at 1737.  Intervention: 25 g IV dextrose per MAR   Repeat blood glucose 102 at 1803.  Intervention: Carb/Protein snack given, recheck blood glucose in 1 hour    Dr. Diez notified of the above at 0741.

## 2025-03-04 NOTE — ASSESSMENT & PLAN NOTE
Unclear etiology, CPK mildly elevated   CT A/P without evidence of biliary obstruction or cirrhosis  RUQUS with fatty live and no cholecystitis  Hepatitis panel negative  LFTs have downtrended

## 2025-03-04 NOTE — DISCHARGE PLANNING
Community Health Worker Intake    Identified barriers to transportation.  Resources provided to MTM benefits.  Contact information provided to Colton Prasad Jr.   Has no PCP.  Inpatient assessment completed.    CHW met with pt at bedside to introduce Community Care Management. Pt lives with his father in a 1-story house. Pt relies on public transportation to get around. Pt states that the only DME he uses is a cane. Pt states he uses the mail order pharmacy. Pt is unemployed and has no income. He states his father is his main support for food and bills. He does not have a PCP, however, he declines any CHW assistance establishing with one.     Plan:  CHW provided pt with MTM benefits and educated on how to use them.     CHW will follow-up with pt post-discharge to ensure all needs are met.

## 2025-03-04 NOTE — PROGRESS NOTES
Hospital Medicine Daily Progress Note    Date of Service  3/4/2025    Chief Complaint  Colton Prasad Jr. is a 54 y.o. male admitted 3/2/2025 with DKA    Hospital Course  55 yo man with DM1 who ran out of insulin and presented with DKA, hyperkalemia, KYREE. Patient found with some concern for lower extremity perfusion, a CTA was performed shows no obstructing vascular disease. There was some concern of lower extremity cellulitis, the patient placed on antibiotic therapy.     Interval Problem Update  Hypoglycemic to 29 yesterday afternoon. He says he's eating better now  He was febrile and tachycardic per nursing. He has been coughing, diaphoretic    I have discussed this patient's plan of care and discharge plan at IDT rounds today with Case Management, Nursing, Nursing leadership, and other members of the IDT team.    Consultants/Specialty  critical care    Code Status  Full Code    Disposition  The patient is not medically cleared for discharge to home or a post-acute facility.  Anticipate discharge to: home with close outpatient follow-up    I have placed the appropriate orders for post-discharge needs.    Review of Systems  Review of Systems   Constitutional:  Positive for diaphoresis, fever and malaise/fatigue.   Respiratory:  Positive for cough.    Cardiovascular:  Positive for leg swelling. Negative for chest pain.   Gastrointestinal:  Negative for abdominal pain.   Genitourinary:  Negative for dysuria.   Skin:  Positive for rash.   Neurological:  Positive for weakness.        Physical Exam  Temp:  [36.8 °C (98.3 °F)-37.7 °C (99.8 °F)] (P) 37.7 °C (99.8 °F)  Pulse:  [] (P) 101  Resp:  [10-22] (P) 22  BP: (124-154)/(52-77) (P) 136/86  SpO2:  [92 %-100 %] (P) 98 %    Physical Exam  Vitals and nursing note reviewed.   Constitutional:       Appearance: He is ill-appearing and diaphoretic.   HENT:      Head: Normocephalic.      Mouth/Throat:      Mouth: Mucous membranes are moist.   Eyes:      General:          Right eye: No discharge.         Left eye: No discharge.   Cardiovascular:      Rate and Rhythm: Regular rhythm. Tachycardia present.   Pulmonary:      Effort: No respiratory distress.      Breath sounds: No wheezing or rales.   Abdominal:      Palpations: Abdomen is soft.      Tenderness: There is no abdominal tenderness. There is no guarding or rebound.   Musculoskeletal:      Cervical back: Neck supple.      Right lower leg: Edema present.      Left lower leg: Edema present.   Skin:     General: Skin is warm.      Comments: Dry scaly heels, mild pink lower extremities   Neurological:      Mental Status: He is alert and oriented to person, place, and time.         Fluids    Intake/Output Summary (Last 24 hours) at 3/4/2025 1532  Last data filed at 3/4/2025 1106  Gross per 24 hour   Intake 837.71 ml   Output 2650 ml   Net -1812.29 ml        Laboratory  Recent Labs     03/02/25  1859 03/02/25 2057 03/02/25  2359   WBC 18.7* 20.8* 17.0*   RBC 3.96* 3.90* 3.78*   HEMOGLOBIN 11.0* 10.9* 10.8*   HEMATOCRIT 36.9* 36.5* 34.3*   MCV 93.2 93.6 90.7   MCH 27.8 27.9 28.6   MCHC 29.8* 29.9* 31.5*   RDW 45.7 45.1 43.3   PLATELETCT 322 306 260   MPV 11.0 11.0 10.4     Recent Labs     03/03/25  0800 03/03/25  1146 03/04/25  0050   SODIUM 134* 135 138   POTASSIUM 4.5 4.2 4.3   CHLORIDE 98 100 102   CO2 21 25 25   GLUCOSE 206* 115* 165*   BUN 39* 34* 24*   CREATININE 1.18 0.95 0.82   CALCIUM 8.1* 8.3* 8.1*     Recent Labs     03/03/25  1321   INR 1.03               Imaging  DX-CHEST-PORTABLE (1 VIEW)   Final Result      1.  Faint right perihilar and infrahilar atelectasis and or pneumonitis.      US-DARLENE SINGLE LEVEL BILAT   Final Result      CT-ABDOMEN-PELVIS WITH   Final Result      1.  Wall thickening of the proximal duodenum suggesting duodenitis/ulcer disease.   2.  No bowel obstruction or perforation.   3.  Stomach is distended with fluid.   4.  Small hiatal hernia with wall thickening, suggesting inflammation.          CT-CTA LOWER EXT WITH & W/O-POST PROCESS RIGHT   Final Result      1.  No high-grade RIGHT lower extremity arterial stenosis.  Potential occlusion of distal peroneal artery.  Two-vessel runoff at the ankle.   2.  Subcutaneous edema of the lower leg and ankle.   3.  No soft tissue abscess or gas.   4.  Muscle atrophy.      CT-CTA LOWER EXT WITH & W/O-POST PROCESS LEFT   Final Result   Addendum (preliminary) 1 of 1   Peroneal artery is not well visualized distally.  Two-vessel runoff at the    ankle.      Final      1.  Diffuse soft tissues edema of LEFT lower extremity, worst in the lower leg and ankle.   2.  No abscess or soft tissue gas.   3.  No gross arterial stenosis or occlusion.   4.  Muscle atrophy.      DX-CHEST-LIMITED (1 VIEW)   Final Result      No acute cardiopulmonary disease.           Assessment/Plan  * DKA, type 1, not at goal (HCC)- (present on admission)  Assessment & Plan  Likely inciting event states he ran out of his medications  A1C 15 indicating very poor compliance  DKA has resolved  Was very hypoglycemic yesterday and glargine changed from 25U to 15U  Glucose now 200s, adjust to 20U daily  ISS      Fever  Assessment & Plan  Continue to treat cellulitis  CXR shows subtle right filed change, f/u UA    Substance abuse ([facility])  Assessment & Plan  Testing positive for methamphetamines    Noncompliance  Assessment & Plan  Very poor diabetic control, supposedly ran out of insulin  Diabetic teaching-consult  Evaluate for additional assistance for resources    Vascular insufficiency of extremity- (present on admission)  Assessment & Plan  RLE CTA with concern for potentially occluded peroneal artery but no evidence of high grade arterial stenosis of large vessel  Has pulses and decent CRT in foot  RLE arterial sono with multiphasic flow in all major arteries  No evidence of ongoing ischemic limb    Lower extremity edema- (present on admission)  Assessment & Plan  CT of RLE shows some soft tissue subQ  edema, no fluid collection or abscess  BLE arterial US with multiphasic flows and normal sonographic DARLENE   No e/o deep space infection at this time  Transition to complete on keflex    Acute metabolic encephalopathy- (present on admission)  Assessment & Plan  Due to DKA associated metabolic derangements  Mental status improved, near baseline    Transaminitis- (present on admission)  Assessment & Plan  Unclear etiology, CPK mildly elevated   CT A/P without evidence of biliary obstruction or cirrhosis  RUQUS with fatty live and no cholecystitis  Hepatitis panel negative  LFTs have downtrended    Hyperkalemia- (present on admission)  Assessment & Plan  Likely related to DKA and severe acidemia  Resolved    KYREE (acute kidney injury) (HCC)- (present on admission)  Assessment & Plan  Likely due to hypovolemia and DKA  Resolved         VTE prophylaxis:    Xarelto 10mg daily as prophylaxis      I have performed a physical exam and reviewed and updated ROS and Plan today (3/4/2025). In review of yesterday's note (3/3/2025), there are no changes except as documented above.

## 2025-03-04 NOTE — CARE PLAN
The patient is Stable - Low risk of patient condition declining or worsening    Shift Goals  Clinical Goals: Pt will monitor BG and maintain skin integrity  Patient Goals: Pt wants to rest and be updated with POC  Family Goals: none present    Progress made toward(s) clinical / shift goals:        Problem: Knowledge Deficit - Standard  Goal: Patient and family/care givers will demonstrate understanding of plan of care, disease process/condition, diagnostic tests and medications  Outcome: Progressing  Note: Pt updated with POC and expressed their understanding with verbal acknowledgement.     Problem: Skin Integrity  Goal: Skin integrity is maintained or improved  Outcome: Progressing  Note: Pt skin integrity maintained with ICU low airloss mattress and mepilex in place.     Problem: Fall Risk  Goal: Patient will remain free from falls  Outcome: Progressing  Note: Pt remained free from falls during shift.     Problem: Respiratory  Goal: Patient will achieve/maintain optimum respiratory ventilation and gas exchange  Outcome: Progressing  Note: Pt oxygenation remained above 90% on 2L NC.        Patient is not progressing towards the following goals:

## 2025-03-04 NOTE — CARE PLAN
The patient is Watcher - Medium risk of patient condition declining or worsening    Shift Goals  Clinical Goals: Monitor blood sugar levels and remain free from fall  Patient Goals: Rest for the night  Family Goals: LILI    Progress made toward(s) clinical / shift goals:        Problem: Fall Risk  Goal: Patient will remain free from falls  3/4/2025 0348 by Renetta James R.N.  Outcome: Progressing  Note: Bed alarm is on and bed is locked and placed in low position. Fall safety education reinforced on to patient. Remains free from fall.  3/4/2025 0347 by Renetta James R.N.  Outcome: Progressing     Problem: Urinary Elimination  Goal: Establish and maintain regular urinary output  3/4/2025 0348 by Renetta James R.N.  Outcome: Progressing  Note: Patient has been using the urinal for the night. Urine appears yellow and clear. Documented the volume in I/O.  3/4/2025 0347 by Renetta James R.N.  Outcome: Progressing     Problem: Pain - Standard  Goal: Alleviation of pain or a reduction in pain to the patient’s comfort goal  3/4/2025 0348 by Renetta James R.N.  Outcome: Progressing  Note: Patient slept comfortably in his bed. Denies any type of pain on assessments. Educated to report any onset of pain.  3/4/2025 0347 by Renetta James R.N.  Outcome: Progressing     Problem: Diabetes Management  Goal: Patient will achieve and maintain glucose in satisfactory range  3/4/2025 0348 by Renetta James R.N.  Outcome: Progressing  Note: Patient's blood sugar level at bedtime was 143. Patient did not require any dose of insulin. Monitoring continued.  3/4/2025 0347 by Renetta James R.N.  Outcome: Progressing       Patient is not progressing towards the following goals:

## 2025-03-04 NOTE — PROGRESS NOTES
0800: Assumed care of patient at 0700. Received report from night shift RN. Pt is A&O x 3, on 3L NC. Reporting a pain level of 0/10. Assessment completed. BP is elevated at 149/77, , and RR 10. Rapid team is rounding on patient. All other VSS. Bowel sounds are active in all 4 quadrants. Lung sounds are diminished. There is edema in the bilateral lower extremities. Call light within reach and bed is locked and in lowest position. Bed alarm on. Reinforced the need to call for assistance. Plan of care discussed and patient does not have any further needs at this time.

## 2025-03-04 NOTE — PROGRESS NOTES
Assumed care of patient at 1900. Received Report from CLARIBEL Blanton. Patient A&Ox3, on 3L of O2 via nasal cannula and not on apparent distress. Reporting a pain level of 0. PIV on left AC and right forearm checked and flushed- patent and no signs of phlebitis. Call light within reach, fall prevention education given belongings within reach, bed alarm is on and bed in lowest position. Continue to be monitored.

## 2025-03-04 NOTE — PROGRESS NOTES
4 Eyes Skin Assessment Completed by CLARIBEL Blanton and CLARIBEL Campbell.    Head WDL  Ears WDL  Nose WDL  Mouth WDL  Neck WDL  Breast/Chest WDL  Shoulder Blades Redness and Blanching  Spine Redness and Blanching  (R) Arm/Elbow/Hand WDL  (L) Arm/Elbow/Hand WDL  Abdomen WDL  Groin WDL  Scrotum/Coccyx/Buttocks Redness and Blanching  (R) Leg WDL  (L) Leg WDL  (R) Heel/Foot/Toe Redness and Blanching  (L) Heel/Foot/Toe Redness and Blanching          Devices In Places NA      Interventions In Place TAP System and Pillows    Possible Skin Injury No    Pictures Uploaded Into Epic No, needs to be completed  Wound Consult Placed N/A  RN Wound Prevention Protocol Ordered No

## 2025-03-04 NOTE — ASSESSMENT & PLAN NOTE
Likely inciting event states he ran out of his medications  A1C 15 indicating very poor compliance  DKA has resolved  Was very hypoglycemic yesterday and glargine changed from 25U to 15U  Glucose now 200s, adjust to 20U daily  ISS

## 2025-03-04 NOTE — ASSESSMENT & PLAN NOTE
Very poor diabetic control, supposedly ran out of insulin  Diabetic teaching-consult  Evaluate for additional assistance for resources

## 2025-03-04 NOTE — ASSESSMENT & PLAN NOTE
CT of RLE shows some soft tissue subQ edema, no fluid collection or abscess  BLE arterial US with multiphasic flows and normal sonographic DARLENE   No e/o deep space infection at this time  Transition to complete on keflex

## 2025-03-05 ENCOUNTER — PHARMACY VISIT (OUTPATIENT)
Dept: PHARMACY | Facility: MEDICAL CENTER | Age: 55
End: 2025-03-05
Payer: COMMERCIAL

## 2025-03-05 VITALS
SYSTOLIC BLOOD PRESSURE: 165 MMHG | HEIGHT: 73 IN | TEMPERATURE: 97.3 F | WEIGHT: 171.96 LBS | HEART RATE: 92 BPM | DIASTOLIC BLOOD PRESSURE: 82 MMHG | BODY MASS INDEX: 22.79 KG/M2 | OXYGEN SATURATION: 95 % | RESPIRATION RATE: 15 BRPM

## 2025-03-05 LAB
ALBUMIN SERPL BCP-MCNC: 2.5 G/DL (ref 3.2–4.9)
ALBUMIN/GLOB SERPL: 1 G/DL
ALP SERPL-CCNC: 165 U/L (ref 30–99)
ALT SERPL-CCNC: 52 U/L (ref 2–50)
ANION GAP SERPL CALC-SCNC: 6 MMOL/L (ref 7–16)
AST SERPL-CCNC: 26 U/L (ref 12–45)
BILIRUB SERPL-MCNC: 0.3 MG/DL (ref 0.1–1.5)
BUN SERPL-MCNC: 13 MG/DL (ref 8–22)
CALCIUM ALBUM COR SERPL-MCNC: 9.4 MG/DL (ref 8.5–10.5)
CALCIUM SERPL-MCNC: 8.2 MG/DL (ref 8.5–10.5)
CHLORIDE SERPL-SCNC: 99 MMOL/L (ref 96–112)
CO2 SERPL-SCNC: 28 MMOL/L (ref 20–33)
CREAT SERPL-MCNC: 0.58 MG/DL (ref 0.5–1.4)
ERYTHROCYTE [DISTWIDTH] IN BLOOD BY AUTOMATED COUNT: 42.4 FL (ref 35.9–50)
GFR SERPLBLD CREATININE-BSD FMLA CKD-EPI: 115 ML/MIN/1.73 M 2
GLOBULIN SER CALC-MCNC: 2.6 G/DL (ref 1.9–3.5)
GLUCOSE BLD STRIP.AUTO-MCNC: 282 MG/DL (ref 65–99)
GLUCOSE BLD STRIP.AUTO-MCNC: 283 MG/DL (ref 65–99)
GLUCOSE BLD STRIP.AUTO-MCNC: 290 MG/DL (ref 65–99)
GLUCOSE SERPL-MCNC: 265 MG/DL (ref 65–99)
HCT VFR BLD AUTO: 31.1 % (ref 42–52)
HGB BLD-MCNC: 10.3 G/DL (ref 14–18)
MCH RBC QN AUTO: 28.4 PG (ref 27–33)
MCHC RBC AUTO-ENTMCNC: 33.1 G/DL (ref 32.3–36.5)
MCV RBC AUTO: 85.7 FL (ref 81.4–97.8)
PLATELET # BLD AUTO: 151 K/UL (ref 164–446)
PMV BLD AUTO: 10.4 FL (ref 9–12.9)
POTASSIUM SERPL-SCNC: 3.7 MMOL/L (ref 3.6–5.5)
PROT SERPL-MCNC: 5.1 G/DL (ref 6–8.2)
RBC # BLD AUTO: 3.63 M/UL (ref 4.7–6.1)
SODIUM SERPL-SCNC: 133 MMOL/L (ref 135–145)
WBC # BLD AUTO: 3.2 K/UL (ref 4.8–10.8)

## 2025-03-05 PROCEDURE — 85027 COMPLETE CBC AUTOMATED: CPT

## 2025-03-05 PROCEDURE — 99239 HOSP IP/OBS DSCHRG MGMT >30: CPT | Performed by: INTERNAL MEDICINE

## 2025-03-05 PROCEDURE — 700102 HCHG RX REV CODE 250 W/ 637 OVERRIDE(OP): Performed by: INTERNAL MEDICINE

## 2025-03-05 PROCEDURE — RXMED WILLOW AMBULATORY MEDICATION CHARGE: Performed by: INTERNAL MEDICINE

## 2025-03-05 PROCEDURE — 80053 COMPREHEN METABOLIC PANEL: CPT

## 2025-03-05 PROCEDURE — 82962 GLUCOSE BLOOD TEST: CPT

## 2025-03-05 PROCEDURE — A9270 NON-COVERED ITEM OR SERVICE: HCPCS | Performed by: INTERNAL MEDICINE

## 2025-03-05 RX ORDER — INSULIN LISPRO 100 [IU]/ML
2-9 INJECTION, SOLUTION INTRAVENOUS; SUBCUTANEOUS
Qty: 15 ML | Refills: 3 | Status: ACTIVE | OUTPATIENT
Start: 2025-03-05

## 2025-03-05 RX ORDER — AVOBENZONE, HOMOSALATE, OCTISALATE, OCTOCRYLENE 30; 40; 45; 26 MG/ML; MG/ML; MG/ML; MG/ML
CREAM TOPICAL
Qty: 100 EACH | Refills: 0 | Status: SHIPPED | OUTPATIENT
Start: 2025-03-05

## 2025-03-05 RX ORDER — GLUCOSAMINE HCL/CHONDROITIN SU 500-400 MG
CAPSULE ORAL
Qty: 100 EACH | Refills: 0 | Status: SHIPPED | OUTPATIENT
Start: 2025-03-05

## 2025-03-05 RX ORDER — ATORVASTATIN CALCIUM 10 MG/1
10 TABLET, FILM COATED ORAL NIGHTLY
Qty: 100 TABLET | Refills: 0 | Status: SHIPPED | OUTPATIENT
Start: 2025-03-05 | End: 2026-04-09

## 2025-03-05 RX ORDER — DIPHENHYDRAMINE HYDROCHLORIDE 25 MG/1
CAPSULE, LIQUID FILLED ORAL
Qty: 1 KIT | Refills: 1 | Status: SHIPPED | OUTPATIENT
Start: 2025-03-05

## 2025-03-05 RX ORDER — CEPHALEXIN 500 MG/1
500 CAPSULE ORAL EVERY 8 HOURS
Qty: 9 CAPSULE | Refills: 0 | Status: ACTIVE | OUTPATIENT
Start: 2025-03-05 | End: 2025-03-08

## 2025-03-05 RX ORDER — INSULIN GLARGINE 100 [IU]/ML
25 INJECTION, SOLUTION SUBCUTANEOUS EVERY MORNING
Qty: 15 ML | Refills: 1 | Status: ACTIVE | OUTPATIENT
Start: 2025-03-05

## 2025-03-05 RX ADMIN — INSULIN LISPRO 5 UNITS: 100 INJECTION, SOLUTION INTRAVENOUS; SUBCUTANEOUS at 08:07

## 2025-03-05 RX ADMIN — INSULIN GLARGINE-YFGN 20 UNITS: 100 INJECTION, SOLUTION SUBCUTANEOUS at 09:03

## 2025-03-05 RX ADMIN — Medication 200 MG: at 05:44

## 2025-03-05 RX ADMIN — CEPHALEXIN 500 MG: 500 CAPSULE ORAL at 13:26

## 2025-03-05 RX ADMIN — INSULIN LISPRO 5 UNITS: 100 INJECTION, SOLUTION INTRAVENOUS; SUBCUTANEOUS at 12:50

## 2025-03-05 RX ADMIN — CEPHALEXIN 500 MG: 500 CAPSULE ORAL at 05:44

## 2025-03-05 ASSESSMENT — SOCIAL DETERMINANTS OF HEALTH (SDOH)
WITHIN THE LAST YEAR, HAVE TO BEEN RAPED OR FORCED TO HAVE ANY KIND OF SEXUAL ACTIVITY BY YOUR PARTNER OR EX-PARTNER?: NO
WITHIN THE LAST YEAR, HAVE YOU BEEN AFRAID OF YOUR PARTNER OR EX-PARTNER?: NO
WITHIN THE PAST 12 MONTHS, THE FOOD YOU BOUGHT JUST DIDN'T LAST AND YOU DIDN'T HAVE MONEY TO GET MORE: PATIENT DECLINED
WITHIN THE LAST YEAR, HAVE YOU BEEN KICKED, HIT, SLAPPED, OR OTHERWISE PHYSICALLY HURT BY YOUR PARTNER OR EX-PARTNER?: NO
WITHIN THE LAST YEAR, HAVE YOU BEEN HUMILIATED OR EMOTIONALLY ABUSED IN OTHER WAYS BY YOUR PARTNER OR EX-PARTNER?: NO
IN THE PAST 12 MONTHS, HAS THE ELECTRIC, GAS, OIL, OR WATER COMPANY THREATENED TO SHUT OFF SERVICE IN YOUR HOME?: PATIENT DECLINED
WITHIN THE PAST 12 MONTHS, YOU WORRIED THAT YOUR FOOD WOULD RUN OUT BEFORE YOU GOT THE MONEY TO BUY MORE: PATIENT DECLINED

## 2025-03-05 ASSESSMENT — PAIN DESCRIPTION - PAIN TYPE
TYPE: ACUTE PAIN

## 2025-03-05 NOTE — CARE PLAN
The patient is Stable - Low risk of patient condition declining or worsening    Shift Goals  Clinical Goals: Blood sugar monitoring and control  Patient Goals: Rest  Family Goals: LILI    Progress made toward(s) clinical / shift goals:  Pt free from pain and able to rest comfortably. Pt free from falls this shift.     Problem: Knowledge Deficit - Standard  Goal: Patient and family/care givers will demonstrate understanding of plan of care, disease process/condition, diagnostic tests and medications  Outcome: Progressing     Problem: Skin Integrity  Goal: Skin integrity is maintained or improved  Outcome: Progressing     Problem: Fall Risk  Goal: Patient will remain free from falls  Outcome: Progressing     Problem: Pain - Standard  Goal: Alleviation of pain or a reduction in pain to the patient’s comfort goal  Outcome: Progressing     Problem: Diabetes Management  Goal: Patient will achieve and maintain glucose in satisfactory range  Outcome: Progressing       Patient is not progressing towards the following goals:

## 2025-03-05 NOTE — CONSULTS
Diabetes education: Pt has a hx of type one diabetes and is known from previous visit. Pt was admitted with blood sugar of 710, and Hga1c of 15.3%. Pt is currently on Glargine 20 units in AM ( lowered from 25 units 3/3 to 15 units 3/4 and the now increased to 20 units to start tomorrow am), with Lispro per sliding scale coverage ac and hs. Blood sugars are 326 ( 6 units), 272 ( 5 units), 231 ( 3 units).  Met with pt this pm. Pt states he usually gets his prescriptions delivered  but ran out x 3 days before admit. Pt states provider was changing medications and Basaglar needed a PA. CDE called [facility] pharmacy and they have not filled for him since 9/24. Called [facility] ( waited for a call back) but all rx were sent to [facility], but there was a PA needed for a Basaglar prescription ( pt stated his provider was changing medications), as well as Admelog . CDE called [facility] and they are waiting for a PA for Basaglar.  Did not have a name for provider  ( earlier was Diana Valle) but a number:  which is hospitals. Pt needs his rx delivered.  Unclear why the change to Basaglar and why use a medication needing a PA and not following up?  Plan: Pt will need insulin and pen needles upon discharge ( one without needing a PA), sent to [facility] k[facility]. CDE will attempt to call [facility] again tomorrow.

## 2025-03-05 NOTE — PROGRESS NOTES
Assumed care of patient at 1900. Received Report from CLARIBEL Stevenson. Patient A&Ox3, on O2 at 3L via nasal cannula and not on apparent distress. Reporting a pain level of 0. PIV on left AC and right forearm checked and flushed- patent and no signs of phlebitis. Patient repositioned and legs elevated with pillows. Call light within reach, fall prevention education given, belongings within reach, bed alarm is on and bed in lowest position. All questions answered, plan of care discussed and pt verbalized understanding.

## 2025-03-05 NOTE — PROGRESS NOTES
Colton Prasad Jr. has been provided discharge instructions, to include follow up care, home medications, and activity/diet reviewed. Copy of discharge instructions in patient chart, signed and reviewed. Patient verbalizes the understanding of the discharge instructions. Arm band removed. Patient did not have home meds during admit. Meds to Beds given. Questions and concerns addressed prior to leaving the discharge lounge. Case Management set up transportation through [facility], ride was cancelled. [facility] then set up another ride, patient was waiting for an hour and patient was picked up by his father instead. Patient discharge to home.

## 2025-03-05 NOTE — CARE PLAN
The patient is Watcher - Medium risk of patient condition declining or worsening    Shift Goals  Clinical Goals: Blood sugar monitoring; remain free from fall through out the night.  Patient Goals: Rest  Family Goals: LILI    Progress made toward(s) clinical / shift goals:        Problem: Fall Risk  Goal: Patient will remain free from falls  Outcome: Progressing  Note: Bed is placed on low position and alarms on. Patient understood the need to use the  call button for assistance. Care items within his reach. Remains free from fall.     Problem: Pain - Standard  Goal: Alleviation of pain or a reduction in pain to the patient’s comfort goal  Outcome: Progressing  Note: Patient slept comfortably through the night; did not report any sort of pain. Educated to report any new onset of pain so we can address it promptly.     Problem: Diabetes Management  Goal: Patient will achieve and maintain glucose in satisfactory range  Outcome: Progressing  Note: Patient understood the need to monitor his blood sugar levels. Educated on dietary changes he may have to observe to regulate his blood sugar level. HS blood sugar reading addressed through insulin sliding scale per order.       Patient is not progressing towards the following goals:

## 2025-03-05 NOTE — DISCHARGE INSTRUCTIONS
Discharge Instructions per Ivette Roper M.D.    Please follow up with the [facility] for further refills of your medications.  Please complete the antibiotic for the cellulitis on your legs.    Short acting insulin sliding scale:  For glucose:  70   - 150  mg/dL =      0 Units  151 - 200  mg/dL =    2 Units  201 - 250  mg/dL =    3 Units  251 - 300  mg/dL  =   5 Units  301 - 350  mg/dL  =   6 Units  351 - 400 mg/dL   =   8 Units  Over 400 mg/dL   =   9 Units and contact your doctor

## 2025-03-05 NOTE — PROGRESS NOTES
Rec'd report from NOC shift RN. Assumed pt care. Pt assessment completed and pt is AA&Ox4. Pt on RA and vss. No complaints of pain at this time. All needs met. Bed locked, bed in lowest position, call light and personal belongings within reach, treaded socks and bed alarm in place for safety. Hourly rounding in place.

## 2025-03-05 NOTE — DISCHARGE PLANNING
DC Transport Scheduled    Transport Company Scheduled:  [employer]  Spoke with Mary at [facility] to schedule transport.  [facility] Trip #: [id number]    Scheduled Date: 3/5/2025  Scheduled Time: 1415    Transport Type: Self  Destination: Home   Destination address: [place] [address]    Notified care team of scheduled transport via [employer].     If there are any changes needed to the DC transportation scheduled, please contact [facility] at ext. [phone] between the hours of [phone]. If outside those hours, contact the ED Case Manager at ext. [phone].

## 2025-03-05 NOTE — PROGRESS NOTES
Diabetes education: Met with pt today. Please see consult note.  Plan: Pt will need insulin and pen needles upon discharge ( one without needing a PA), sent to [facility] k[facility]. CDE will attempt to call [facility] again tomorrow.

## 2025-03-05 NOTE — PROGRESS NOTES
4 Eyes Skin Assessment Completed by CLARIBEL Stevenson and CLARIBEL Jackson.    Head WDL  Ears WDL  Nose WDL  Mouth WDL  Neck WDL  Breast/Chest WDL  Shoulder Blades WDL  Spine WDL  (R) Arm/Elbow/Hand WDL  (L) Arm/Elbow/Hand WDL  Abdomen WDL  Groin Redness  Scrotum/Coccyx/Buttocks Redness and Blanching  (R) Leg Redness and Swelling  (L) Leg Swelling  (R) Heel/Foot/Toe Swelling and Dryness  (L) Heel/Foot/Toe Swelling and Dryness          Devices In Places Blood Pressure Cuff, Pulse Ox, and Nasal Cannula      Interventions In Place Gray Ear Foams, Heel Mepilex, Sacral Mepilex, TAP System, Pillows, Q2 Turns, and Heels Loaded W/Pillows    Possible Skin Injury Yes    Pictures Uploaded Into Epic Yes  Wound Consult Placed N/A  RN Wound Prevention Protocol Ordered Yes

## 2025-03-06 LAB
BACTERIA UR CULT: NORMAL
SIGNIFICANT IND 70042: NORMAL
SITE SITE: NORMAL
SOURCE SOURCE: NORMAL

## 2025-03-06 NOTE — PROGRESS NOTES
Diabetes education: Met with pt prior to discharge.  Pt confirmed his PCP is Diana Yang and he missed his appointment on 3/3. CDE  called [facility] to inform them of his admission ( and discharge today). That appointment was a phone appointment. Pt has a in person appointment on 3/18 at 1120 at the [address] [place] [address] location. Pt informed ( he forgot), and CDE place this information in the AVS for discharge.  Explained pt needs to bring the AVS to his appointment, and call [facility] soon to schedule transportation.  Pt was anxious to leave and wanted medications moved ( RN waiting for medications to discharge). CDE explained he was in DKA and admitted because he ran out of medications so it would be better to wait ( per pharmacy 20 minutes), for the medications to assure he has them. Glargine given in AM here and to be given in AM at home. Questions answered.

## 2025-03-06 NOTE — DISCHARGE SUMMARY
"Discharge Summary    CHIEF COMPLAINT ON ADMISSION  Chief Complaint   Patient presents with    High Blood Sugar     Pt BIB EMS. Per report pt has not had insulin for 3 days, BG \"HIGH\" PTA. Pt with multiple recent GLF, no trauma noted, negative thinners.     Leg Swelling    GLF       Reason for Admission  ems     Admission Date  3/2/2025    CODE STATUS  Full Code    HPI & HOSPITAL COURSE  55 yo man with DM1 who ran out of insulin and presented with DKA, hyperkalemia, KYREE. DKA and KYREE resolved, he was resumed on insulin regimen. A1c 15%.   Patient found with some concern for lower extremity perfusion, a CTA was performed shows no obstructing vascular disease. There was some concern of lower extremity cellulitis, the patient placed on ancef/keflex for this with improvement. Otherwise blood cultures were negative, UA neg for infection. He was weaned to room air. I provided him refills on discharge.    Therefore, he is discharged in good and stable condition to home with close outpatient follow-up.    The patient met 2-midnight criteria for an inpatient stay at the time of discharge.    Discharge Date  3/5/2025    FOLLOW UP ITEMS POST DISCHARGE  DM management    DISCHARGE DIAGNOSES  Principal Problem:    DKA, type 1, not at goal (HCC) (POA: Yes)  Active Problems:    KYREE (acute kidney injury) (HCC) (POA: Yes)    Hyperkalemia (POA: Yes)    Transaminitis (POA: Yes)    Acute metabolic encephalopathy (POA: Yes)    Lower extremity edema (POA: Yes)    Vascular insufficiency of extremity (POA: Yes)    Noncompliance (POA: Unknown)    Substance abuse (HCC) (POA: Unknown)    Fever (POA: Unknown)  Resolved Problems:    Mottled skin (POA: Unknown)    Pseudohyponatremia (POA: Unknown)      FOLLOW UP  [facility]  [address] [facility] [address]  [phone]  Go in 13 day(s)  You have an in person appointment with GRETCHEN Yang at [facility]'s [address] th [place] location, on 3/18/25 at 1120 am. Please call [facility] soon to schedule " transportation and arrive before the 1120 appointment. Please bring med list from [facility] as well      MEDICATIONS ON DISCHARGE     Medication List        START taking these medications        Instructions   Alcohol Prep 70 % Pads   Doctor's comments: Per formulary preference. ICD-10 code: E11.65 Uncontrolled type 2 Diabetes Mellitus  Wipe site with prep pad prior to injection.     atorvastatin 10 MG Tabs  Commonly known as: Lipitor   Take 1 Tablet by mouth every evening.  Dose: 10 mg     BD Pen Needle Vaishali U/F  Generic drug: Insulin Pen Needle 32 G x 4 mm   Doctor's comments: Per patient/formulary preference. ICD-10 code: E11.65 Uncontrolled type 2 Diabetes Mellitus  Use one pen needle in pen device to inject insulin four times daily.     cephALEXin 500 MG Caps  Commonly known as: Keflex   Take 1 Capsule by mouth every 8 hours for 3 days.  Dose: 500 mg     glucose blood strip   Doctor's comments: Or per formulary preference. ICD-10 code: E11.65 Uncontrolled type 2 Diabetes Mellitus  test blood sugar three times daily before meals.     insulin lispro 100 UNIT/ML Sopn injection PEN  Commonly known as: HumaLOG/AdmeLOG   Inject 2-9 Units under the skin 3 times a day before meals. For glucose: 70 - 150  mg/dL = 0 Units; 151 - 200  mg/dL = 2 Units; 201 - 250  mg/dL =  3 Units; 251 - 300  mg/dL =  5 Units; 301 - 350  mg/dL  =  6 Units; 351 - 400 mg/dL = 8 Units; Over 400 mg/dL = 9 Units  Dose: 2-9 Units     Lancets   Doctor's comments: Or per formulary preference. ICD-10 code: E11.65 Uncontrolled type 2 Diabetes Mellitus  test blood sugar three times daily before meals.     Lantus SoloStar 100 UNIT/ML Sopn injection  Generic drug: insulin glargine   Inject 25 Units under the skin every morning.  Dose: 25 Units     * True Metrix Meter w/Device Kit   Doctor's comments: Please dispense kit that is available  Use to check blood sugar as directed     * Blood Glucose Meter Kit   Doctor's comments: Or per formulary preference.  ICD-10 code: E11.65 Uncontrolled type 2 Diabetes Mellitus  Test blood sugar as recommended by provider. One Touch Verio blood glucose monitoring kit.           * This list has 2 medication(s) that are the same as other medications prescribed for you. Read the directions carefully, and ask your doctor or other care provider to review them with you.                  Allergies  No Active Allergies    DIET  Orders Placed This Encounter   Procedures    Diet Order Diet: Consistent CHO (Diabetic)     Standing Status:   Standing     Number of Occurrences:   1     Diet::   Consistent CHO (Diabetic) [4]    Discontinue Diet Tray     Standing Status:   Standing     Number of Occurrences:   1    Discontinue Diet Tray     Standing Status:   Standing     Number of Occurrences:   1    Discontinue Diet Tray     Standing Status:   Standing     Number of Occurrences:   1       ACTIVITY  As tolerated.    CONSULTATIONS  Critical care    PROCEDURES  DX-CHEST-PORTABLE (1 VIEW)   Final Result      1.  Faint right perihilar and infrahilar atelectasis and or pneumonitis.      US-DARLENE SINGLE LEVEL BILAT   Final Result      CT-ABDOMEN-PELVIS WITH   Final Result      1.  Wall thickening of the proximal duodenum suggesting duodenitis/ulcer disease.   2.  No bowel obstruction or perforation.   3.  Stomach is distended with fluid.   4.  Small hiatal hernia with wall thickening, suggesting inflammation.         CT-CTA LOWER EXT WITH & W/O-POST PROCESS RIGHT   Final Result      1.  No high-grade RIGHT lower extremity arterial stenosis.  Potential occlusion of distal peroneal artery.  Two-vessel runoff at the ankle.   2.  Subcutaneous edema of the lower leg and ankle.   3.  No soft tissue abscess or gas.   4.  Muscle atrophy.      CT-CTA LOWER EXT WITH & W/O-POST PROCESS LEFT   Final Result   Addendum (preliminary) 1 of 1   Peroneal artery is not well visualized distally.  Two-vessel runoff at the    ankle.      Final      1.  Diffuse soft tissues  edema of LEFT lower extremity, worst in the lower leg and ankle.   2.  No abscess or soft tissue gas.   3.  No gross arterial stenosis or occlusion.   4.  Muscle atrophy.      DX-CHEST-LIMITED (1 VIEW)   Final Result      No acute cardiopulmonary disease.            LABORATORY  Lab Results   Component Value Date    SODIUM 133 (L) 03/05/2025    POTASSIUM 3.7 03/05/2025    CHLORIDE 99 03/05/2025    CO2 28 03/05/2025    GLUCOSE 265 (H) 03/05/2025    BUN 13 03/05/2025    CREATININE 0.58 03/05/2025    CREATININE 0.9 02/25/2009        Lab Results   Component Value Date    WBC 3.2 (L) 03/05/2025    HEMOGLOBIN 10.3 (L) 03/05/2025    HEMATOCRIT 31.1 (L) 03/05/2025    PLATELETCT 151 (L) 03/05/2025        Total time of the discharge process exceeds 32 minutes.

## 2025-03-07 LAB
BACTERIA BLD CULT: NORMAL
BACTERIA BLD CULT: NORMAL
SIGNIFICANT IND 70042: NORMAL
SIGNIFICANT IND 70042: NORMAL
SITE SITE: NORMAL
SITE SITE: NORMAL
SOURCE SOURCE: NORMAL
SOURCE SOURCE: NORMAL

## 2025-03-10 ENCOUNTER — PATIENT OUTREACH (OUTPATIENT)
Dept: HEALTH INFORMATION MANAGEMENT | Facility: OTHER | Age: 55
End: 2025-03-10
Payer: MEDICAID

## 2025-03-10 NOTE — PROGRESS NOTES
CHW tried to contact patient over phone, but patient did not answer and CHW was sent to voicemail. CHW left a detailed voicemail with contact information.     CHW will try and reach out again at a later date.

## 2025-03-18 NOTE — PROGRESS NOTES
CHW tried to contact patient over phone, but patient did not answer and CHW was sent to voicemail. CHW left a detailed voicemail with contact information.     3x no answer.     CHW will now close case due to inability to contact pt. CHW will reopen case if pt reaches out with any needs.

## 2025-08-15 ENCOUNTER — APPOINTMENT (OUTPATIENT)
Dept: RADIOLOGY | Facility: MEDICAL CENTER | Age: 55
DRG: 871 | End: 2025-08-15
Attending: INTERNAL MEDICINE
Payer: MEDICAID

## 2025-08-15 ENCOUNTER — APPOINTMENT (OUTPATIENT)
Dept: RADIOLOGY | Facility: MEDICAL CENTER | Age: 55
DRG: 871 | End: 2025-08-15
Attending: EMERGENCY MEDICINE
Payer: MEDICAID

## 2025-08-15 ENCOUNTER — HOSPITAL ENCOUNTER (INPATIENT)
Facility: MEDICAL CENTER | Age: 55
LOS: 4 days | DRG: 871 | End: 2025-08-19
Attending: EMERGENCY MEDICINE | Admitting: INTERNAL MEDICINE
Payer: MEDICAID

## 2025-08-15 DIAGNOSIS — E83.51 HYPOCALCEMIA: ICD-10-CM

## 2025-08-15 DIAGNOSIS — S81.801A NON-HEALING WOUND OF RIGHT LOWER EXTREMITY: ICD-10-CM

## 2025-08-15 DIAGNOSIS — R53.81 PHYSICAL DEBILITY: ICD-10-CM

## 2025-08-15 DIAGNOSIS — E10.10 DIABETIC KETOACIDOSIS WITHOUT COMA ASSOCIATED WITH TYPE 1 DIABETES MELLITUS (HCC): Primary | ICD-10-CM

## 2025-08-15 DIAGNOSIS — F17.219 CIGARETTE NICOTINE DEPENDENCE WITH NICOTINE-INDUCED DISORDER: ICD-10-CM

## 2025-08-15 DIAGNOSIS — I10 PRIMARY HYPERTENSION: ICD-10-CM

## 2025-08-15 DIAGNOSIS — I82.621 ACUTE DEEP VEIN THROMBOSIS (DVT) OF RIGHT UPPER EXTREMITY, UNSPECIFIED VEIN (HCC): ICD-10-CM

## 2025-08-15 PROBLEM — A41.9 SEPSIS (HCC): Status: ACTIVE | Noted: 2025-08-15

## 2025-08-15 LAB
ALBUMIN SERPL BCP-MCNC: 2.8 G/DL (ref 3.2–4.9)
ALBUMIN/GLOB SERPL: 0.9 G/DL
ALP SERPL-CCNC: 136 U/L (ref 30–99)
ALT SERPL-CCNC: 31 U/L (ref 2–50)
AMPHET UR QL SCN: NEGATIVE
ANION GAP SERPL CALC-SCNC: 12 MMOL/L (ref 7–16)
ANION GAP SERPL CALC-SCNC: 14 MMOL/L (ref 7–16)
ANION GAP SERPL CALC-SCNC: 31 MMOL/L (ref 7–16)
ANION GAP SERPL CALC-SCNC: 35 MMOL/L (ref 7–16)
ANION GAP SERPL CALC-SCNC: 37 MMOL/L (ref 7–16)
ANISOCYTOSIS BLD QL SMEAR: ABNORMAL
APPEARANCE UR: CLEAR
AST SERPL-CCNC: 41 U/L (ref 12–45)
B-OH-BUTYR SERPL-MCNC: >9 MMOL/L (ref 0.02–0.27)
BACTERIA #/AREA URNS HPF: ABNORMAL /HPF
BARBITURATES UR QL SCN: NEGATIVE
BASE EXCESS BLDV CALC-SCNC: -29 MMOL/L (ref -2–3)
BASOPHILS # BLD AUTO: 0 % (ref 0–1.8)
BASOPHILS # BLD: 0 K/UL (ref 0–0.12)
BENZODIAZ UR QL SCN: NEGATIVE
BILIRUB SERPL-MCNC: <0.2 MG/DL (ref 0.1–1.5)
BILIRUB UR QL STRIP.AUTO: NEGATIVE
BODY TEMPERATURE: 36.5 CENTIGRADE
BUN SERPL-MCNC: 49 MG/DL (ref 8–22)
BUN SERPL-MCNC: 55 MG/DL (ref 8–22)
BUN SERPL-MCNC: 56 MG/DL (ref 8–22)
BUN SERPL-MCNC: 58 MG/DL (ref 8–22)
BUN SERPL-MCNC: 60 MG/DL (ref 8–22)
BURR CELLS BLD QL SMEAR: NORMAL
BZE UR QL SCN: NEGATIVE
CALCIUM ALBUM COR SERPL-MCNC: 9.4 MG/DL (ref 8.5–10.5)
CALCIUM SERPL-MCNC: 7.7 MG/DL (ref 8.5–10.5)
CALCIUM SERPL-MCNC: 7.7 MG/DL (ref 8.5–10.5)
CALCIUM SERPL-MCNC: 7.9 MG/DL (ref 8.5–10.5)
CALCIUM SERPL-MCNC: 8.2 MG/DL (ref 8.5–10.5)
CALCIUM SERPL-MCNC: 8.4 MG/DL (ref 8.5–10.5)
CANNABINOIDS UR QL SCN: NEGATIVE
CASTS URNS QL MICRO: ABNORMAL /LPF (ref 0–2)
CHLORIDE SERPL-SCNC: 101 MMOL/L (ref 96–112)
CHLORIDE SERPL-SCNC: 82 MMOL/L (ref 96–112)
CHLORIDE SERPL-SCNC: 83 MMOL/L (ref 96–112)
CHLORIDE SERPL-SCNC: 90 MMOL/L (ref 96–112)
CHLORIDE SERPL-SCNC: 97 MMOL/L (ref 96–112)
CO2 SERPL-SCNC: 20 MMOL/L (ref 20–33)
CO2 SERPL-SCNC: 23 MMOL/L (ref 20–33)
CO2 SERPL-SCNC: 3 MMOL/L (ref 20–33)
CO2 SERPL-SCNC: 4 MMOL/L (ref 20–33)
CO2 SERPL-SCNC: 9 MMOL/L (ref 20–33)
COLOR UR: YELLOW
CORTIS SERPL-MCNC: 58.2 UG/DL (ref 0–23)
CREAT SERPL-MCNC: 1.4 MG/DL (ref 0.5–1.4)
CREAT SERPL-MCNC: 1.49 MG/DL (ref 0.5–1.4)
CREAT SERPL-MCNC: 1.51 MG/DL (ref 0.5–1.4)
CREAT SERPL-MCNC: 1.59 MG/DL (ref 0.5–1.4)
CREAT SERPL-MCNC: 1.71 MG/DL (ref 0.5–1.4)
EKG IMPRESSION: NORMAL
EOSINOPHIL # BLD AUTO: 0 K/UL (ref 0–0.51)
EOSINOPHIL NFR BLD: 0 % (ref 0–6.9)
EPITHELIAL CELLS 1715: ABNORMAL /HPF (ref 0–5)
ERYTHROCYTE [DISTWIDTH] IN BLOOD BY AUTOMATED COUNT: 39.7 FL (ref 35.9–50)
FENTANYL UR QL: NEGATIVE
GFR SERPLBLD CREATININE-BSD FMLA CKD-EPI: 47 ML/MIN/1.73 M 2
GFR SERPLBLD CREATININE-BSD FMLA CKD-EPI: 51 ML/MIN/1.73 M 2
GFR SERPLBLD CREATININE-BSD FMLA CKD-EPI: 54 ML/MIN/1.73 M 2
GFR SERPLBLD CREATININE-BSD FMLA CKD-EPI: 55 ML/MIN/1.73 M 2
GFR SERPLBLD CREATININE-BSD FMLA CKD-EPI: 59 ML/MIN/1.73 M 2
GLOBULIN SER CALC-MCNC: 3.2 G/DL (ref 1.9–3.5)
GLUCOSE BLD STRIP.AUTO-MCNC: 135 MG/DL (ref 65–99)
GLUCOSE BLD STRIP.AUTO-MCNC: 146 MG/DL (ref 65–99)
GLUCOSE BLD STRIP.AUTO-MCNC: 162 MG/DL (ref 65–99)
GLUCOSE BLD STRIP.AUTO-MCNC: 226 MG/DL (ref 65–99)
GLUCOSE BLD STRIP.AUTO-MCNC: 285 MG/DL (ref 65–99)
GLUCOSE BLD STRIP.AUTO-MCNC: 322 MG/DL (ref 65–99)
GLUCOSE BLD STRIP.AUTO-MCNC: 342 MG/DL (ref 65–99)
GLUCOSE BLD STRIP.AUTO-MCNC: 378 MG/DL (ref 65–99)
GLUCOSE BLD STRIP.AUTO-MCNC: 381 MG/DL (ref 65–99)
GLUCOSE BLD STRIP.AUTO-MCNC: 474 MG/DL (ref 65–99)
GLUCOSE BLD STRIP.AUTO-MCNC: 487 MG/DL (ref 65–99)
GLUCOSE BLD STRIP.AUTO-MCNC: 526 MG/DL (ref 65–99)
GLUCOSE BLD STRIP.AUTO-MCNC: 561 MG/DL (ref 65–99)
GLUCOSE BLD STRIP.AUTO-MCNC: 589 MG/DL (ref 65–99)
GLUCOSE BLD STRIP.AUTO-MCNC: 88 MG/DL (ref 65–99)
GLUCOSE BLD STRIP.AUTO-MCNC: >600 MG/DL (ref 65–99)
GLUCOSE SERPL-MCNC: 129 MG/DL (ref 65–99)
GLUCOSE SERPL-MCNC: 326 MG/DL (ref 65–99)
GLUCOSE SERPL-MCNC: 597 MG/DL (ref 65–99)
GLUCOSE SERPL-MCNC: 630 MG/DL (ref 65–99)
GLUCOSE SERPL-MCNC: 727 MG/DL (ref 65–99)
GLUCOSE SERPL-MCNC: 734 MG/DL (ref 65–99)
GLUCOSE UR STRIP.AUTO-MCNC: >=1000 MG/DL
HCO3 BLDV-SCNC: 2 MMOL/L (ref 22–29)
HCT VFR BLD AUTO: 40.6 % (ref 42–52)
HGB BLD-MCNC: 12.8 G/DL (ref 14–18)
HYALINE CAST   1831: PRESENT /LPF
INR PPP: 1.18 (ref 0.87–1.13)
KETONES UR STRIP.AUTO-MCNC: 40 MG/DL
LACTATE SERPL-SCNC: 1.9 MMOL/L (ref 0.5–2)
LACTATE SERPL-SCNC: 2.1 MMOL/L (ref 0.5–2)
LACTATE SERPL-SCNC: 3.3 MMOL/L (ref 0.5–2)
LACTATE SERPL-SCNC: 3.6 MMOL/L (ref 0.5–2)
LEUKOCYTE ESTERASE UR QL STRIP.AUTO: NEGATIVE
LYMPHOCYTES # BLD AUTO: 0.81 K/UL (ref 1–4.8)
LYMPHOCYTES NFR BLD: 7 % (ref 22–41)
MAGNESIUM SERPL-MCNC: 1.8 MG/DL (ref 1.5–2.5)
MAGNESIUM SERPL-MCNC: 2 MG/DL (ref 1.5–2.5)
MAGNESIUM SERPL-MCNC: 2.3 MG/DL (ref 1.5–2.5)
MANUAL DIFF BLD: NORMAL
MCH RBC QN AUTO: 27.6 PG (ref 27–33)
MCHC RBC AUTO-ENTMCNC: 31.5 G/DL (ref 32.3–36.5)
MCV RBC AUTO: 87.7 FL (ref 81.4–97.8)
METAMYELOCYTES NFR BLD MANUAL: 3.5 %
METHADONE UR QL SCN: NEGATIVE
MICRO URNS: ABNORMAL
MICROCYTES BLD QL SMEAR: ABNORMAL
MONOCYTES # BLD AUTO: 0.2 K/UL (ref 0–0.85)
MONOCYTES NFR BLD AUTO: 1.7 % (ref 0–13.4)
MORPHOLOGY BLD-IMP: NORMAL
MYELOCYTES NFR BLD MANUAL: 1.7 %
NEUTROPHILS # BLD AUTO: 9.9 K/UL (ref 1.82–7.42)
NEUTROPHILS NFR BLD: 84.4 % (ref 44–72)
NEUTS BAND NFR BLD MANUAL: 1.7 % (ref 0–10)
NITRITE UR QL STRIP.AUTO: NEGATIVE
NRBC # BLD AUTO: 0 K/UL
NRBC BLD-RTO: 0 /100 WBC (ref 0–0.2)
OPIATES UR QL SCN: NEGATIVE
OSMOLALITY SERPL: 338 MOSM/KG H2O (ref 278–298)
OXYCODONE UR QL SCN: NEGATIVE
PCO2 BLDV: 7.4 MMHG (ref 38–54)
PCO2 TEMP ADJ BLDV: 7.2 MMHG (ref 38–54)
PCP UR QL SCN: NEGATIVE
PH BLDV: 6.92 [PH] (ref 7.31–7.45)
PH TEMP ADJ BLDV: 6.93 [PH] (ref 7.31–7.45)
PH UR STRIP.AUTO: 5 [PH] (ref 5–8)
PHOSPHATE SERPL-MCNC: 2.2 MG/DL (ref 2.5–4.5)
PHOSPHATE SERPL-MCNC: 5.5 MG/DL (ref 2.5–4.5)
PHOSPHATE SERPL-MCNC: 7.9 MG/DL (ref 2.5–4.5)
PLATELET # BLD AUTO: 317 K/UL (ref 164–446)
PLATELET BLD QL SMEAR: NORMAL
PMV BLD AUTO: 11 FL (ref 9–12.9)
PO2 BLDV: 67.8 MMHG (ref 23–48)
PO2 TEMP ADJ BLDV: 65.6 MMHG (ref 23–48)
POIKILOCYTOSIS BLD QL SMEAR: NORMAL
POTASSIUM SERPL-SCNC: 3.6 MMOL/L (ref 3.6–5.5)
POTASSIUM SERPL-SCNC: 3.9 MMOL/L (ref 3.6–5.5)
POTASSIUM SERPL-SCNC: 5.2 MMOL/L (ref 3.6–5.5)
POTASSIUM SERPL-SCNC: 7.1 MMOL/L (ref 3.6–5.5)
POTASSIUM SERPL-SCNC: 7.1 MMOL/L (ref 3.6–5.5)
PROCALCITONIN SERPL-MCNC: 6.51 NG/ML
PROPOXYPH UR QL SCN: NEGATIVE
PROT SERPL-MCNC: 6 G/DL (ref 6–8.2)
PROT UR QL STRIP: 100 MG/DL
PROTHROMBIN TIME: 15.2 SEC (ref 12–14.6)
RBC # BLD AUTO: 4.63 M/UL (ref 4.7–6.1)
RBC # URNS HPF: ABNORMAL /HPF (ref 0–2)
RBC BLD AUTO: PRESENT
RBC UR QL AUTO: ABNORMAL
SAO2 % BLDV: 85 % (ref 60–85)
SODIUM SERPL-SCNC: 120 MMOL/L (ref 135–145)
SODIUM SERPL-SCNC: 124 MMOL/L (ref 135–145)
SODIUM SERPL-SCNC: 130 MMOL/L (ref 135–145)
SODIUM SERPL-SCNC: 133 MMOL/L (ref 135–145)
SODIUM SERPL-SCNC: 134 MMOL/L (ref 135–145)
SP GR UR STRIP.AUTO: 1.02
UROBILINOGEN UR STRIP.AUTO-MCNC: 0.2 EU/DL
VANCOMYCIN SERPL-MCNC: 14.5 UG/ML
WBC # BLD AUTO: 11.5 K/UL (ref 4.8–10.8)
WBC #/AREA URNS HPF: ABNORMAL /HPF
WBC TOXIC VACUOLES BLD QL SMEAR: NORMAL

## 2025-08-15 PROCEDURE — 80307 DRUG TEST PRSMV CHEM ANLYZR: CPT

## 2025-08-15 PROCEDURE — 85027 COMPLETE CBC AUTOMATED: CPT

## 2025-08-15 PROCEDURE — 84145 PROCALCITONIN (PCT): CPT

## 2025-08-15 PROCEDURE — 96374 THER/PROPH/DIAG INJ IV PUSH: CPT

## 2025-08-15 PROCEDURE — 85610 PROTHROMBIN TIME: CPT

## 2025-08-15 PROCEDURE — 71045 X-RAY EXAM CHEST 1 VIEW: CPT

## 2025-08-15 PROCEDURE — 700111 HCHG RX REV CODE 636 W/ 250 OVERRIDE (IP): Mod: JZ | Performed by: INTERNAL MEDICINE

## 2025-08-15 PROCEDURE — 700111 HCHG RX REV CODE 636 W/ 250 OVERRIDE (IP)

## 2025-08-15 PROCEDURE — 82962 GLUCOSE BLOOD TEST: CPT | Performed by: INTERNAL MEDICINE

## 2025-08-15 PROCEDURE — 700111 HCHG RX REV CODE 636 W/ 250 OVERRIDE (IP): Performed by: INTERNAL MEDICINE

## 2025-08-15 PROCEDURE — C1751 CATH, INF, PER/CENT/MIDLINE: HCPCS

## 2025-08-15 PROCEDURE — 770022 HCHG ROOM/CARE - ICU (200)

## 2025-08-15 PROCEDURE — 82010 KETONE BODYS QUAN: CPT

## 2025-08-15 PROCEDURE — 82803 BLOOD GASES ANY COMBINATION: CPT

## 2025-08-15 PROCEDURE — 83605 ASSAY OF LACTIC ACID: CPT | Mod: 91

## 2025-08-15 PROCEDURE — 700102 HCHG RX REV CODE 250 W/ 637 OVERRIDE(OP): Performed by: INTERNAL MEDICINE

## 2025-08-15 PROCEDURE — 83735 ASSAY OF MAGNESIUM: CPT

## 2025-08-15 PROCEDURE — 96361 HYDRATE IV INFUSION ADD-ON: CPT

## 2025-08-15 PROCEDURE — 99291 CRITICAL CARE FIRST HOUR: CPT | Performed by: INTERNAL MEDICINE

## 2025-08-15 PROCEDURE — 93005 ELECTROCARDIOGRAM TRACING: CPT | Mod: TC | Performed by: EMERGENCY MEDICINE

## 2025-08-15 PROCEDURE — 81001 URINALYSIS AUTO W/SCOPE: CPT

## 2025-08-15 PROCEDURE — 87086 URINE CULTURE/COLONY COUNT: CPT

## 2025-08-15 PROCEDURE — 80053 COMPREHEN METABOLIC PANEL: CPT

## 2025-08-15 PROCEDURE — 96375 TX/PRO/DX INJ NEW DRUG ADDON: CPT

## 2025-08-15 PROCEDURE — 84100 ASSAY OF PHOSPHORUS: CPT

## 2025-08-15 PROCEDURE — 87077 CULTURE AEROBIC IDENTIFY: CPT

## 2025-08-15 PROCEDURE — 82947 ASSAY GLUCOSE BLOOD QUANT: CPT

## 2025-08-15 PROCEDURE — 82533 TOTAL CORTISOL: CPT

## 2025-08-15 PROCEDURE — 80048 BASIC METABOLIC PNL TOTAL CA: CPT

## 2025-08-15 PROCEDURE — 87040 BLOOD CULTURE FOR BACTERIA: CPT

## 2025-08-15 PROCEDURE — 85007 BL SMEAR W/DIFF WBC COUNT: CPT

## 2025-08-15 PROCEDURE — 83930 ASSAY OF BLOOD OSMOLALITY: CPT

## 2025-08-15 PROCEDURE — 700105 HCHG RX REV CODE 258: Performed by: INTERNAL MEDICINE

## 2025-08-15 PROCEDURE — 700102 HCHG RX REV CODE 250 W/ 637 OVERRIDE(OP)

## 2025-08-15 PROCEDURE — 99291 CRITICAL CARE FIRST HOUR: CPT

## 2025-08-15 PROCEDURE — 87186 SC STD MICRODIL/AGAR DIL: CPT

## 2025-08-15 PROCEDURE — 700105 HCHG RX REV CODE 258: Performed by: EMERGENCY MEDICINE

## 2025-08-15 PROCEDURE — 05H933Z INSERTION OF INFUSION DEVICE INTO RIGHT BRACHIAL VEIN, PERCUTANEOUS APPROACH: ICD-10-PCS | Performed by: INTERNAL MEDICINE

## 2025-08-15 PROCEDURE — 700111 HCHG RX REV CODE 636 W/ 250 OVERRIDE (IP): Performed by: EMERGENCY MEDICINE

## 2025-08-15 PROCEDURE — 80202 ASSAY OF VANCOMYCIN: CPT

## 2025-08-15 RX ORDER — SODIUM CHLORIDE, SODIUM LACTATE, POTASSIUM CHLORIDE, CALCIUM CHLORIDE 600; 310; 30; 20 MG/100ML; MG/100ML; MG/100ML; MG/100ML
INJECTION, SOLUTION INTRAVENOUS CONTINUOUS
Status: DISCONTINUED | OUTPATIENT
Start: 2025-08-15 | End: 2025-08-15

## 2025-08-15 RX ORDER — POTASSIUM CHLORIDE 7.45 MG/ML
10 INJECTION INTRAVENOUS
Status: COMPLETED | OUTPATIENT
Start: 2025-08-15 | End: 2025-08-15

## 2025-08-15 RX ORDER — POTASSIUM CHLORIDE 7.45 MG/ML
10 INJECTION INTRAVENOUS
Status: COMPLETED | OUTPATIENT
Start: 2025-08-15 | End: 2025-08-16

## 2025-08-15 RX ORDER — SODIUM CHLORIDE, SODIUM LACTATE, POTASSIUM CHLORIDE, AND CALCIUM CHLORIDE .6; .31; .03; .02 G/100ML; G/100ML; G/100ML; G/100ML
2000 INJECTION, SOLUTION INTRAVENOUS ONCE
Status: COMPLETED | OUTPATIENT
Start: 2025-08-15 | End: 2025-08-15

## 2025-08-15 RX ORDER — MAGNESIUM SULFATE HEPTAHYDRATE 40 MG/ML
4 INJECTION, SOLUTION INTRAVENOUS
Status: DISCONTINUED | OUTPATIENT
Start: 2025-08-15 | End: 2025-08-15

## 2025-08-15 RX ORDER — DEXTROSE AND SODIUM CHLORIDE 10; .45 G/100ML; G/100ML
INJECTION, SOLUTION INTRAVENOUS CONTINUOUS
Status: ACTIVE | OUTPATIENT
Start: 2025-08-15 | End: 2025-08-16

## 2025-08-15 RX ORDER — MAGNESIUM SULFATE HEPTAHYDRATE 40 MG/ML
2 INJECTION, SOLUTION INTRAVENOUS
Status: DISCONTINUED | OUTPATIENT
Start: 2025-08-15 | End: 2025-08-15

## 2025-08-15 RX ORDER — DEXTROSE MONOHYDRATE 25 G/50ML
25 INJECTION, SOLUTION INTRAVENOUS
Status: DISCONTINUED | OUTPATIENT
Start: 2025-08-15 | End: 2025-08-17

## 2025-08-15 RX ORDER — POTASSIUM CHLORIDE 7.45 MG/ML
10 INJECTION INTRAVENOUS ONCE
Status: COMPLETED | OUTPATIENT
Start: 2025-08-15 | End: 2025-08-15

## 2025-08-15 RX ORDER — M-VIT,TX,IRON,MINS/CALC/FOLIC 27MG-0.4MG
1 TABLET ORAL DAILY
COMMUNITY
End: 2025-08-23

## 2025-08-15 RX ORDER — ACETAMINOPHEN 325 MG/1
650 TABLET ORAL EVERY 6 HOURS PRN
Status: DISCONTINUED | OUTPATIENT
Start: 2025-08-15 | End: 2025-08-19 | Stop reason: HOSPADM

## 2025-08-15 RX ORDER — INDOMETHACIN 25 MG/1
100 CAPSULE ORAL ONCE
Status: COMPLETED | OUTPATIENT
Start: 2025-08-15 | End: 2025-08-15

## 2025-08-15 RX ORDER — SODIUM CHLORIDE, SODIUM LACTATE, POTASSIUM CHLORIDE, AND CALCIUM CHLORIDE .6; .31; .03; .02 G/100ML; G/100ML; G/100ML; G/100ML
30 INJECTION, SOLUTION INTRAVENOUS ONCE
Status: COMPLETED | OUTPATIENT
Start: 2025-08-15 | End: 2025-08-15

## 2025-08-15 RX ORDER — DEXTROSE, SODIUM CHLORIDE, SODIUM LACTATE, POTASSIUM CHLORIDE, AND CALCIUM CHLORIDE 5; .6; .31; .03; .02 G/100ML; G/100ML; G/100ML; G/100ML; G/100ML
INJECTION, SOLUTION INTRAVENOUS CONTINUOUS
Status: DISCONTINUED | OUTPATIENT
Start: 2025-08-15 | End: 2025-08-15

## 2025-08-15 RX ORDER — HEPARIN SODIUM 5000 [USP'U]/ML
5000 INJECTION, SOLUTION INTRAVENOUS; SUBCUTANEOUS EVERY 8 HOURS
Status: DISCONTINUED | OUTPATIENT
Start: 2025-08-15 | End: 2025-08-17

## 2025-08-15 RX ORDER — INSULIN LISPRO 100 [IU]/ML
3-14 INJECTION, SOLUTION INTRAVENOUS; SUBCUTANEOUS EVERY 6 HOURS
Status: DISCONTINUED | OUTPATIENT
Start: 2025-08-16 | End: 2025-08-17

## 2025-08-15 RX ADMIN — INSULIN HUMAN 10 UNITS: 100 INJECTION, SOLUTION PARENTERAL at 05:43

## 2025-08-15 RX ADMIN — PIPERACILLIN AND TAZOBACTAM 4.5 G: 4; .5 INJECTION, POWDER, FOR SOLUTION INTRAVENOUS at 17:26

## 2025-08-15 RX ADMIN — HEPARIN SODIUM 5000 UNITS: 5000 INJECTION, SOLUTION INTRAVENOUS; SUBCUTANEOUS at 13:26

## 2025-08-15 RX ADMIN — SODIUM CHLORIDE, POTASSIUM CHLORIDE, SODIUM LACTATE AND CALCIUM CHLORIDE: 600; 310; 30; 20 INJECTION, SOLUTION INTRAVENOUS at 09:47

## 2025-08-15 RX ADMIN — VANCOMYCIN HYDROCHLORIDE 2000 MG: 5 INJECTION, POWDER, LYOPHILIZED, FOR SOLUTION INTRAVENOUS at 07:18

## 2025-08-15 RX ADMIN — PIPERACILLIN AND TAZOBACTAM 4.5 G: 4; .5 INJECTION, POWDER, FOR SOLUTION INTRAVENOUS at 07:56

## 2025-08-15 RX ADMIN — PIPERACILLIN AND TAZOBACTAM 4.5 G: 4; .5 INJECTION, POWDER, FOR SOLUTION INTRAVENOUS at 09:29

## 2025-08-15 RX ADMIN — POTASSIUM CHLORIDE 10 MEQ: 7.46 INJECTION, SOLUTION INTRAVENOUS at 23:14

## 2025-08-15 RX ADMIN — SODIUM CHLORIDE 20 UNITS/HR: 9 INJECTION, SOLUTION INTRAVENOUS at 21:17

## 2025-08-15 RX ADMIN — HEPARIN SODIUM 5000 UNITS: 5000 INJECTION, SOLUTION INTRAVENOUS; SUBCUTANEOUS at 07:40

## 2025-08-15 RX ADMIN — DEXTROSE AND SODIUM CHLORIDE: 10; .45 INJECTION, SOLUTION INTRAVENOUS at 21:12

## 2025-08-15 RX ADMIN — POTASSIUM CHLORIDE 10 MEQ: 7.46 INJECTION, SOLUTION INTRAVENOUS at 13:29

## 2025-08-15 RX ADMIN — POTASSIUM CHLORIDE 10 MEQ: 7.46 INJECTION, SOLUTION INTRAVENOUS at 22:11

## 2025-08-15 RX ADMIN — SODIUM CHLORIDE, POTASSIUM CHLORIDE, SODIUM LACTATE AND CALCIUM CHLORIDE 2340 ML: 600; 310; 30; 20 INJECTION, SOLUTION INTRAVENOUS at 04:48

## 2025-08-15 RX ADMIN — POTASSIUM CHLORIDE 10 MEQ: 7.46 INJECTION, SOLUTION INTRAVENOUS at 18:20

## 2025-08-15 RX ADMIN — SODIUM CHLORIDE 8 UNITS/HR: 9 INJECTION, SOLUTION INTRAVENOUS at 07:39

## 2025-08-15 RX ADMIN — SODIUM BICARBONATE 100 MEQ: 84 INJECTION INTRAVENOUS at 06:02

## 2025-08-15 RX ADMIN — POTASSIUM CHLORIDE 10 MEQ: 7.46 INJECTION, SOLUTION INTRAVENOUS at 19:28

## 2025-08-15 RX ADMIN — HEPARIN SODIUM 5000 UNITS: 5000 INJECTION, SOLUTION INTRAVENOUS; SUBCUTANEOUS at 21:05

## 2025-08-15 RX ADMIN — SODIUM CHLORIDE, SODIUM LACTATE, POTASSIUM CHLORIDE, CALCIUM CHLORIDE AND DEXTROSE MONOHYDRATE: 5; 600; 310; 30; 20 INJECTION, SOLUTION INTRAVENOUS at 19:07

## 2025-08-15 RX ADMIN — SODIUM CHLORIDE 16 UNITS/HR: 9 INJECTION, SOLUTION INTRAVENOUS at 16:11

## 2025-08-15 RX ADMIN — SODIUM CHLORIDE, POTASSIUM CHLORIDE, SODIUM LACTATE AND CALCIUM CHLORIDE 2000 ML: 600; 310; 30; 20 INJECTION, SOLUTION INTRAVENOUS at 07:51

## 2025-08-15 RX ADMIN — PIPERACILLIN AND TAZOBACTAM 4.5 G: 4; .5 INJECTION, POWDER, FOR SOLUTION INTRAVENOUS at 23:07

## 2025-08-15 ASSESSMENT — PAIN DESCRIPTION - PAIN TYPE
TYPE: ACUTE PAIN
TYPE: ACUTE PAIN

## 2025-08-15 ASSESSMENT — FIBROSIS 4 INDEX: FIB4 SCORE: 1.31

## 2025-08-16 ENCOUNTER — APPOINTMENT (OUTPATIENT)
Dept: RADIOLOGY | Facility: MEDICAL CENTER | Age: 55
DRG: 871 | End: 2025-08-16
Payer: MEDICAID

## 2025-08-16 PROBLEM — S81.801A NON-HEALING WOUND OF RIGHT LOWER EXTREMITY: Status: ACTIVE | Noted: 2025-08-16

## 2025-08-16 LAB
ALBUMIN SERPL BCP-MCNC: 2.5 G/DL (ref 3.2–4.9)
ALBUMIN/GLOB SERPL: 1 G/DL
ALP SERPL-CCNC: 95 U/L (ref 30–99)
ALT SERPL-CCNC: 24 U/L (ref 2–50)
ANION GAP SERPL CALC-SCNC: 9 MMOL/L (ref 7–16)
AST SERPL-CCNC: 33 U/L (ref 12–45)
BASOPHILS # BLD AUTO: 0.2 % (ref 0–1.8)
BASOPHILS # BLD: 0.02 K/UL (ref 0–0.12)
BILIRUB SERPL-MCNC: 0.2 MG/DL (ref 0.1–1.5)
BUN SERPL-MCNC: 45 MG/DL (ref 8–22)
CALCIUM ALBUM COR SERPL-MCNC: 9.2 MG/DL (ref 8.5–10.5)
CALCIUM SERPL-MCNC: 8 MG/DL (ref 8.5–10.5)
CHLORIDE SERPL-SCNC: 101 MMOL/L (ref 96–112)
CO2 SERPL-SCNC: 24 MMOL/L (ref 20–33)
CREAT SERPL-MCNC: 1.36 MG/DL (ref 0.5–1.4)
EOSINOPHIL # BLD AUTO: 0.01 K/UL (ref 0–0.51)
EOSINOPHIL NFR BLD: 0.1 % (ref 0–6.9)
ERYTHROCYTE [DISTWIDTH] IN BLOOD BY AUTOMATED COUNT: 35.4 FL (ref 35.9–50)
GFR SERPLBLD CREATININE-BSD FMLA CKD-EPI: 61 ML/MIN/1.73 M 2
GLOBULIN SER CALC-MCNC: 2.5 G/DL (ref 1.9–3.5)
GLUCOSE BLD STRIP.AUTO-MCNC: 109 MG/DL (ref 65–99)
GLUCOSE BLD STRIP.AUTO-MCNC: 112 MG/DL (ref 65–99)
GLUCOSE BLD STRIP.AUTO-MCNC: 203 MG/DL (ref 65–99)
GLUCOSE BLD STRIP.AUTO-MCNC: 292 MG/DL (ref 65–99)
GLUCOSE BLD STRIP.AUTO-MCNC: 58 MG/DL (ref 65–99)
GLUCOSE BLD STRIP.AUTO-MCNC: 60 MG/DL (ref 65–99)
GLUCOSE BLD STRIP.AUTO-MCNC: 83 MG/DL (ref 65–99)
GLUCOSE BLD STRIP.AUTO-MCNC: 87 MG/DL (ref 65–99)
GLUCOSE BLD STRIP.AUTO-MCNC: 98 MG/DL (ref 65–99)
GLUCOSE SERPL-MCNC: 143 MG/DL (ref 65–99)
HCT VFR BLD AUTO: 32.6 % (ref 42–52)
HGB BLD-MCNC: 11.4 G/DL (ref 14–18)
IMM GRANULOCYTES # BLD AUTO: 0.07 K/UL (ref 0–0.11)
IMM GRANULOCYTES NFR BLD AUTO: 0.7 % (ref 0–0.9)
LYMPHOCYTES # BLD AUTO: 0.5 K/UL (ref 1–4.8)
LYMPHOCYTES NFR BLD: 5.3 % (ref 22–41)
MCH RBC QN AUTO: 27.9 PG (ref 27–33)
MCHC RBC AUTO-ENTMCNC: 35 G/DL (ref 32.3–36.5)
MCV RBC AUTO: 79.7 FL (ref 81.4–97.8)
MONOCYTES # BLD AUTO: 1.56 K/UL (ref 0–0.85)
MONOCYTES NFR BLD AUTO: 16.5 % (ref 0–13.4)
NEUTROPHILS # BLD AUTO: 7.3 K/UL (ref 1.82–7.42)
NEUTROPHILS NFR BLD: 77.2 % (ref 44–72)
NRBC # BLD AUTO: 0 K/UL
NRBC BLD-RTO: 0 /100 WBC (ref 0–0.2)
PLATELET # BLD AUTO: 219 K/UL (ref 164–446)
PMV BLD AUTO: 9.6 FL (ref 9–12.9)
POTASSIUM SERPL-SCNC: 4 MMOL/L (ref 3.6–5.5)
PROT SERPL-MCNC: 5 G/DL (ref 6–8.2)
RBC # BLD AUTO: 4.09 M/UL (ref 4.7–6.1)
SODIUM SERPL-SCNC: 134 MMOL/L (ref 135–145)
WBC # BLD AUTO: 9.5 K/UL (ref 4.8–10.8)

## 2025-08-16 PROCEDURE — 80053 COMPREHEN METABOLIC PANEL: CPT

## 2025-08-16 PROCEDURE — 82962 GLUCOSE BLOOD TEST: CPT | Performed by: INTERNAL MEDICINE

## 2025-08-16 PROCEDURE — 85025 COMPLETE CBC W/AUTO DIFF WBC: CPT

## 2025-08-16 PROCEDURE — 700111 HCHG RX REV CODE 636 W/ 250 OVERRIDE (IP): Performed by: INTERNAL MEDICINE

## 2025-08-16 PROCEDURE — 99223 1ST HOSP IP/OBS HIGH 75: CPT | Performed by: INTERNAL MEDICINE

## 2025-08-16 PROCEDURE — A9270 NON-COVERED ITEM OR SERVICE: HCPCS | Performed by: INTERNAL MEDICINE

## 2025-08-16 PROCEDURE — 700117 HCHG RX CONTRAST REV CODE 255

## 2025-08-16 PROCEDURE — 700111 HCHG RX REV CODE 636 W/ 250 OVERRIDE (IP): Mod: JZ | Performed by: INTERNAL MEDICINE

## 2025-08-16 PROCEDURE — 73701 CT LOWER EXTREMITY W/DYE: CPT | Mod: RT

## 2025-08-16 PROCEDURE — 770006 HCHG ROOM/CARE - MED/SURG/GYN SEMI*

## 2025-08-16 PROCEDURE — 700102 HCHG RX REV CODE 250 W/ 637 OVERRIDE(OP): Performed by: INTERNAL MEDICINE

## 2025-08-16 PROCEDURE — 700105 HCHG RX REV CODE 258: Performed by: INTERNAL MEDICINE

## 2025-08-16 PROCEDURE — 51798 US URINE CAPACITY MEASURE: CPT

## 2025-08-16 PROCEDURE — 700102 HCHG RX REV CODE 250 W/ 637 OVERRIDE(OP)

## 2025-08-16 RX ORDER — NICOTINE 21 MG/24HR
21 PATCH, TRANSDERMAL 24 HOURS TRANSDERMAL
Status: DISCONTINUED | OUTPATIENT
Start: 2025-08-16 | End: 2025-08-19 | Stop reason: HOSPADM

## 2025-08-16 RX ADMIN — HEPARIN SODIUM 5000 UNITS: 5000 INJECTION, SOLUTION INTRAVENOUS; SUBCUTANEOUS at 14:44

## 2025-08-16 RX ADMIN — PIPERACILLIN AND TAZOBACTAM 4.5 G: 4; .5 INJECTION, POWDER, FOR SOLUTION INTRAVENOUS at 22:21

## 2025-08-16 RX ADMIN — PIPERACILLIN AND TAZOBACTAM 4.5 G: 4; .5 INJECTION, POWDER, FOR SOLUTION INTRAVENOUS at 14:50

## 2025-08-16 RX ADMIN — NICOTINE 21 MG: 21 PATCH TRANSDERMAL at 16:08

## 2025-08-16 RX ADMIN — INSULIN LISPRO 4 UNITS: 100 INJECTION, SOLUTION INTRAVENOUS; SUBCUTANEOUS at 18:14

## 2025-08-16 RX ADMIN — PIPERACILLIN AND TAZOBACTAM 4.5 G: 4; .5 INJECTION, POWDER, FOR SOLUTION INTRAVENOUS at 05:11

## 2025-08-16 RX ADMIN — IOHEXOL 90 ML: 350 INJECTION, SOLUTION INTRAVENOUS at 14:25

## 2025-08-16 RX ADMIN — HEPARIN SODIUM 5000 UNITS: 5000 INJECTION, SOLUTION INTRAVENOUS; SUBCUTANEOUS at 05:11

## 2025-08-16 RX ADMIN — VANCOMYCIN HYDROCHLORIDE 1250 MG: 5 INJECTION, POWDER, LYOPHILIZED, FOR SOLUTION INTRAVENOUS at 00:47

## 2025-08-16 RX ADMIN — VANCOMYCIN HYDROCHLORIDE 750 MG: 5 INJECTION, POWDER, LYOPHILIZED, FOR SOLUTION INTRAVENOUS at 11:58

## 2025-08-16 RX ADMIN — INSULIN GLARGINE-YFGN 30 UNITS: 100 INJECTION, SOLUTION SUBCUTANEOUS at 00:03

## 2025-08-16 RX ADMIN — INSULIN GLARGINE-YFGN 30 UNITS: 100 INJECTION, SOLUTION SUBCUTANEOUS at 19:49

## 2025-08-16 ASSESSMENT — ENCOUNTER SYMPTOMS
SENSORY CHANGE: 0
FOCAL WEAKNESS: 0
NAUSEA: 0
ABDOMINAL PAIN: 0
SHORTNESS OF BREATH: 0
DIARRHEA: 0
PHOTOPHOBIA: 0
BLURRED VISION: 0
TINGLING: 0
FEVER: 0
MYALGIAS: 0
WEIGHT LOSS: 0
CHILLS: 0
EYE PAIN: 0
NECK PAIN: 0
DIZZINESS: 0
SPEECH CHANGE: 0
HALLUCINATIONS: 0
COUGH: 0
DOUBLE VISION: 0
CONSTIPATION: 0
ORTHOPNEA: 0
TREMORS: 0
HEADACHES: 0
SPUTUM PRODUCTION: 0
PALPITATIONS: 0
BACK PAIN: 0
VOMITING: 0

## 2025-08-16 ASSESSMENT — PAIN DESCRIPTION - PAIN TYPE
TYPE: ACUTE PAIN

## 2025-08-16 ASSESSMENT — LIFESTYLE VARIABLES: SUBSTANCE_ABUSE: 0

## 2025-08-17 PROBLEM — A41.9 SEPSIS (HCC): Status: RESOLVED | Noted: 2025-08-15 | Resolved: 2025-08-17

## 2025-08-17 PROBLEM — E87.5 HYPERKALEMIA: Status: RESOLVED | Noted: 2025-03-02 | Resolved: 2025-08-17

## 2025-08-17 LAB
ALBUMIN SERPL BCP-MCNC: 2.3 G/DL (ref 3.2–4.9)
ALBUMIN/GLOB SERPL: 1 G/DL
ALP SERPL-CCNC: 99 U/L (ref 30–99)
ALT SERPL-CCNC: 25 U/L (ref 2–50)
ANION GAP SERPL CALC-SCNC: 8 MMOL/L (ref 7–16)
AST SERPL-CCNC: 47 U/L (ref 12–45)
BILIRUB SERPL-MCNC: 0.2 MG/DL (ref 0.1–1.5)
BUN SERPL-MCNC: 38 MG/DL (ref 8–22)
CALCIUM ALBUM COR SERPL-MCNC: 9.2 MG/DL (ref 8.5–10.5)
CALCIUM SERPL-MCNC: 7.8 MG/DL (ref 8.5–10.5)
CHLORIDE SERPL-SCNC: 100 MMOL/L (ref 96–112)
CO2 SERPL-SCNC: 24 MMOL/L (ref 20–33)
CREAT SERPL-MCNC: 1.14 MG/DL (ref 0.5–1.4)
ERYTHROCYTE [DISTWIDTH] IN BLOOD BY AUTOMATED COUNT: 37.5 FL (ref 35.9–50)
EST. AVERAGE GLUCOSE BLD GHB EST-MCNC: 306 MG/DL
GFR SERPLBLD CREATININE-BSD FMLA CKD-EPI: 76 ML/MIN/1.73 M 2
GLOBULIN SER CALC-MCNC: 2.3 G/DL (ref 1.9–3.5)
GLUCOSE BLD STRIP.AUTO-MCNC: 161 MG/DL (ref 65–99)
GLUCOSE BLD STRIP.AUTO-MCNC: 209 MG/DL (ref 65–99)
GLUCOSE BLD STRIP.AUTO-MCNC: 212 MG/DL (ref 65–99)
GLUCOSE BLD STRIP.AUTO-MCNC: 220 MG/DL (ref 65–99)
GLUCOSE BLD STRIP.AUTO-MCNC: 87 MG/DL (ref 65–99)
GLUCOSE SERPL-MCNC: 105 MG/DL (ref 65–99)
HBA1C MFR BLD: 12.3 % (ref 4–5.6)
HCT VFR BLD AUTO: 29.5 % (ref 42–52)
HGB BLD-MCNC: 10.2 G/DL (ref 14–18)
MAGNESIUM SERPL-MCNC: 1.8 MG/DL (ref 1.5–2.5)
MCH RBC QN AUTO: 27.1 PG (ref 27–33)
MCHC RBC AUTO-ENTMCNC: 34.6 G/DL (ref 32.3–36.5)
MCV RBC AUTO: 78.2 FL (ref 81.4–97.8)
PHOSPHATE SERPL-MCNC: 2 MG/DL (ref 2.5–4.5)
PLATELET # BLD AUTO: 167 K/UL (ref 164–446)
PMV BLD AUTO: 10.1 FL (ref 9–12.9)
POTASSIUM SERPL-SCNC: 3.3 MMOL/L (ref 3.6–5.5)
PROT SERPL-MCNC: 4.6 G/DL (ref 6–8.2)
RBC # BLD AUTO: 3.77 M/UL (ref 4.7–6.1)
SCCMEC + MECA PNL NOSE NAA+PROBE: NEGATIVE
SODIUM SERPL-SCNC: 132 MMOL/L (ref 135–145)
WBC # BLD AUTO: 3.7 K/UL (ref 4.8–10.8)

## 2025-08-17 PROCEDURE — 82962 GLUCOSE BLOOD TEST: CPT | Performed by: INTERNAL MEDICINE

## 2025-08-17 PROCEDURE — A9270 NON-COVERED ITEM OR SERVICE: HCPCS | Performed by: NURSE PRACTITIONER

## 2025-08-17 PROCEDURE — 700102 HCHG RX REV CODE 250 W/ 637 OVERRIDE(OP): Performed by: HOSPITALIST

## 2025-08-17 PROCEDURE — 700111 HCHG RX REV CODE 636 W/ 250 OVERRIDE (IP): Mod: JZ | Performed by: HOSPITALIST

## 2025-08-17 PROCEDURE — 700102 HCHG RX REV CODE 250 W/ 637 OVERRIDE(OP): Performed by: INTERNAL MEDICINE

## 2025-08-17 PROCEDURE — 84100 ASSAY OF PHOSPHORUS: CPT

## 2025-08-17 PROCEDURE — 770006 HCHG ROOM/CARE - MED/SURG/GYN SEMI*

## 2025-08-17 PROCEDURE — 700105 HCHG RX REV CODE 258: Performed by: INTERNAL MEDICINE

## 2025-08-17 PROCEDURE — 83036 HEMOGLOBIN GLYCOSYLATED A1C: CPT

## 2025-08-17 PROCEDURE — 87641 MR-STAPH DNA AMP PROBE: CPT

## 2025-08-17 PROCEDURE — 36415 COLL VENOUS BLD VENIPUNCTURE: CPT

## 2025-08-17 PROCEDURE — 700102 HCHG RX REV CODE 250 W/ 637 OVERRIDE(OP)

## 2025-08-17 PROCEDURE — 85027 COMPLETE CBC AUTOMATED: CPT

## 2025-08-17 PROCEDURE — 83735 ASSAY OF MAGNESIUM: CPT

## 2025-08-17 PROCEDURE — 700111 HCHG RX REV CODE 636 W/ 250 OVERRIDE (IP): Mod: JZ | Performed by: NURSE PRACTITIONER

## 2025-08-17 PROCEDURE — A9270 NON-COVERED ITEM OR SERVICE: HCPCS | Performed by: INTERNAL MEDICINE

## 2025-08-17 PROCEDURE — A9270 NON-COVERED ITEM OR SERVICE: HCPCS

## 2025-08-17 PROCEDURE — 82962 GLUCOSE BLOOD TEST: CPT | Performed by: HOSPITALIST

## 2025-08-17 PROCEDURE — 700105 HCHG RX REV CODE 258: Performed by: HOSPITALIST

## 2025-08-17 PROCEDURE — 80053 COMPREHEN METABOLIC PANEL: CPT

## 2025-08-17 PROCEDURE — 700102 HCHG RX REV CODE 250 W/ 637 OVERRIDE(OP): Performed by: NURSE PRACTITIONER

## 2025-08-17 PROCEDURE — A9270 NON-COVERED ITEM OR SERVICE: HCPCS | Performed by: HOSPITALIST

## 2025-08-17 PROCEDURE — 99233 SBSQ HOSP IP/OBS HIGH 50: CPT | Performed by: HOSPITALIST

## 2025-08-17 PROCEDURE — 700111 HCHG RX REV CODE 636 W/ 250 OVERRIDE (IP): Mod: JZ | Performed by: INTERNAL MEDICINE

## 2025-08-17 PROCEDURE — 700111 HCHG RX REV CODE 636 W/ 250 OVERRIDE (IP): Performed by: INTERNAL MEDICINE

## 2025-08-17 PROCEDURE — 700101 HCHG RX REV CODE 250: Performed by: HOSPITALIST

## 2025-08-17 PROCEDURE — 97602 WOUND(S) CARE NON-SELECTIVE: CPT

## 2025-08-17 RX ORDER — TRAMADOL HYDROCHLORIDE 50 MG/1
50 TABLET ORAL EVERY 6 HOURS PRN
Status: DISCONTINUED | OUTPATIENT
Start: 2025-08-17 | End: 2025-08-19 | Stop reason: HOSPADM

## 2025-08-17 RX ORDER — ATORVASTATIN CALCIUM 10 MG/1
10 TABLET, FILM COATED ORAL NIGHTLY
Status: DISCONTINUED | OUTPATIENT
Start: 2025-08-17 | End: 2025-08-19 | Stop reason: HOSPADM

## 2025-08-17 RX ORDER — DEXTROSE MONOHYDRATE 25 G/50ML
25 INJECTION, SOLUTION INTRAVENOUS
Status: DISCONTINUED | OUTPATIENT
Start: 2025-08-17 | End: 2025-08-19 | Stop reason: HOSPADM

## 2025-08-17 RX ORDER — INSULIN LISPRO 100 [IU]/ML
3 INJECTION, SOLUTION INTRAVENOUS; SUBCUTANEOUS
Status: DISCONTINUED | OUTPATIENT
Start: 2025-08-17 | End: 2025-08-19 | Stop reason: HOSPADM

## 2025-08-17 RX ORDER — INSULIN LISPRO 100 [IU]/ML
2-9 INJECTION, SOLUTION INTRAVENOUS; SUBCUTANEOUS
Status: DISCONTINUED | OUTPATIENT
Start: 2025-08-17 | End: 2025-08-19 | Stop reason: HOSPADM

## 2025-08-17 RX ORDER — GABAPENTIN 100 MG/1
100 CAPSULE ORAL 3 TIMES DAILY
Status: DISCONTINUED | OUTPATIENT
Start: 2025-08-17 | End: 2025-08-19 | Stop reason: HOSPADM

## 2025-08-17 RX ORDER — KETOROLAC TROMETHAMINE 15 MG/ML
15 INJECTION, SOLUTION INTRAMUSCULAR; INTRAVENOUS ONCE
Status: COMPLETED | OUTPATIENT
Start: 2025-08-17 | End: 2025-08-17

## 2025-08-17 RX ORDER — INSULIN LISPRO 100 [IU]/ML
5 INJECTION, SOLUTION INTRAVENOUS; SUBCUTANEOUS
Status: DISCONTINUED | OUTPATIENT
Start: 2025-08-17 | End: 2025-08-17

## 2025-08-17 RX ORDER — ENOXAPARIN SODIUM 100 MG/ML
40 INJECTION SUBCUTANEOUS DAILY
Status: DISCONTINUED | OUTPATIENT
Start: 2025-08-17 | End: 2025-08-18

## 2025-08-17 RX ADMIN — CALCIUM POLYCARBOPHIL 625 MG: 625 TABLET, FILM COATED ORAL at 18:03

## 2025-08-17 RX ADMIN — POTASSIUM PHOSPHATE, MONOBASIC POTASSIUM PHOSPHATE, DIBASIC INJECTION, 15 MMOL: 236; 224 SOLUTION, CONCENTRATE INTRAVENOUS at 10:47

## 2025-08-17 RX ADMIN — KETOROLAC TROMETHAMINE 15 MG: 15 INJECTION, SOLUTION INTRAMUSCULAR; INTRAVENOUS at 20:02

## 2025-08-17 RX ADMIN — AMPICILLIN AND SULBACTAM 3 G: 1; 2 INJECTION, POWDER, FOR SOLUTION INTRAMUSCULAR; INTRAVENOUS at 18:06

## 2025-08-17 RX ADMIN — VANCOMYCIN HYDROCHLORIDE 750 MG: 5 INJECTION, POWDER, LYOPHILIZED, FOR SOLUTION INTRAVENOUS at 00:39

## 2025-08-17 RX ADMIN — GABAPENTIN 100 MG: 100 CAPSULE ORAL at 23:29

## 2025-08-17 RX ADMIN — HEPARIN SODIUM 5000 UNITS: 5000 INJECTION, SOLUTION INTRAVENOUS; SUBCUTANEOUS at 05:45

## 2025-08-17 RX ADMIN — INSULIN LISPRO 2 UNITS: 100 INJECTION, SOLUTION INTRAVENOUS; SUBCUTANEOUS at 21:07

## 2025-08-17 RX ADMIN — INSULIN LISPRO 4 UNITS: 100 INJECTION, SOLUTION INTRAVENOUS; SUBCUTANEOUS at 00:47

## 2025-08-17 RX ADMIN — INSULIN LISPRO 3 UNITS: 100 INJECTION, SOLUTION INTRAVENOUS; SUBCUTANEOUS at 17:57

## 2025-08-17 RX ADMIN — PIPERACILLIN AND TAZOBACTAM 4.5 G: 4; .5 INJECTION, POWDER, FOR SOLUTION INTRAVENOUS at 10:44

## 2025-08-17 RX ADMIN — PIPERACILLIN AND TAZOBACTAM 4.5 G: 4; .5 INJECTION, POWDER, FOR SOLUTION INTRAVENOUS at 03:45

## 2025-08-17 RX ADMIN — TRAMADOL HYDROCHLORIDE 50 MG: 50 TABLET, COATED ORAL at 20:02

## 2025-08-17 RX ADMIN — ACETAMINOPHEN 650 MG: 325 TABLET ORAL at 10:41

## 2025-08-17 RX ADMIN — INSULIN GLARGINE-YFGN 30 UNITS: 100 INJECTION, SOLUTION SUBCUTANEOUS at 17:57

## 2025-08-17 RX ADMIN — INSULIN LISPRO 4 UNITS: 100 INJECTION, SOLUTION INTRAVENOUS; SUBCUTANEOUS at 13:13

## 2025-08-17 RX ADMIN — ATORVASTATIN CALCIUM 10 MG: 10 TABLET, FILM COATED ORAL at 21:01

## 2025-08-17 RX ADMIN — AMPICILLIN AND SULBACTAM 3 G: 1; 2 INJECTION, POWDER, FOR SOLUTION INTRAMUSCULAR; INTRAVENOUS at 23:30

## 2025-08-17 RX ADMIN — ENOXAPARIN SODIUM 40 MG: 100 INJECTION SUBCUTANEOUS at 18:03

## 2025-08-17 RX ADMIN — NICOTINE 21 MG: 21 PATCH TRANSDERMAL at 05:46

## 2025-08-17 ASSESSMENT — PAIN DESCRIPTION - PAIN TYPE
TYPE: ACUTE PAIN

## 2025-08-18 ENCOUNTER — APPOINTMENT (OUTPATIENT)
Dept: RADIOLOGY | Facility: MEDICAL CENTER | Age: 55
DRG: 871 | End: 2025-08-18
Attending: HOSPITALIST
Payer: MEDICAID

## 2025-08-18 PROBLEM — R60.9 EDEMA: Status: ACTIVE | Noted: 2025-08-18

## 2025-08-18 PROBLEM — I10 HYPERTENSION: Status: ACTIVE | Noted: 2025-08-18

## 2025-08-18 LAB
ANION GAP SERPL CALC-SCNC: 9 MMOL/L (ref 7–16)
APTT PPP: 31.2 SEC (ref 24.7–36)
BACTERIA UR CULT: ABNORMAL
BACTERIA UR CULT: ABNORMAL
BUN SERPL-MCNC: 27 MG/DL (ref 8–22)
CALCIUM SERPL-MCNC: 7.9 MG/DL (ref 8.5–10.5)
CHLORIDE SERPL-SCNC: 102 MMOL/L (ref 96–112)
CO2 SERPL-SCNC: 24 MMOL/L (ref 20–33)
CREAT SERPL-MCNC: 1.19 MG/DL (ref 0.5–1.4)
ERYTHROCYTE [DISTWIDTH] IN BLOOD BY AUTOMATED COUNT: 40.4 FL (ref 35.9–50)
GFR SERPLBLD CREATININE-BSD FMLA CKD-EPI: 72 ML/MIN/1.73 M 2
GLUCOSE BLD STRIP.AUTO-MCNC: 120 MG/DL (ref 65–99)
GLUCOSE BLD STRIP.AUTO-MCNC: 157 MG/DL (ref 65–99)
GLUCOSE BLD STRIP.AUTO-MCNC: 178 MG/DL (ref 65–99)
GLUCOSE BLD STRIP.AUTO-MCNC: 232 MG/DL (ref 65–99)
GLUCOSE BLD STRIP.AUTO-MCNC: 41 MG/DL (ref 65–99)
GLUCOSE BLD STRIP.AUTO-MCNC: 44 MG/DL (ref 65–99)
GLUCOSE BLD STRIP.AUTO-MCNC: 59 MG/DL (ref 65–99)
GLUCOSE BLD STRIP.AUTO-MCNC: 82 MG/DL (ref 65–99)
GLUCOSE SERPL-MCNC: 122 MG/DL (ref 65–99)
HCT VFR BLD AUTO: 30.3 % (ref 42–52)
HGB BLD-MCNC: 9.9 G/DL (ref 14–18)
INR PPP: 0.89 (ref 0.87–1.13)
MCH RBC QN AUTO: 27 PG (ref 27–33)
MCHC RBC AUTO-ENTMCNC: 32.7 G/DL (ref 32.3–36.5)
MCV RBC AUTO: 82.8 FL (ref 81.4–97.8)
PLATELET # BLD AUTO: 114 K/UL (ref 164–446)
PMV BLD AUTO: 10 FL (ref 9–12.9)
POTASSIUM SERPL-SCNC: 3.6 MMOL/L (ref 3.6–5.5)
PROTHROMBIN TIME: 12.1 SEC (ref 12–14.6)
RBC # BLD AUTO: 3.66 M/UL (ref 4.7–6.1)
SIGNIFICANT IND 70042: ABNORMAL
SITE SITE: ABNORMAL
SODIUM SERPL-SCNC: 135 MMOL/L (ref 135–145)
SOURCE SOURCE: ABNORMAL
UFH PPP CHRO-ACNC: 0.28 IU/ML
WBC # BLD AUTO: 3.1 K/UL (ref 4.8–10.8)

## 2025-08-18 PROCEDURE — 85730 THROMBOPLASTIN TIME PARTIAL: CPT

## 2025-08-18 PROCEDURE — 700102 HCHG RX REV CODE 250 W/ 637 OVERRIDE(OP): Performed by: HOSPITALIST

## 2025-08-18 PROCEDURE — 93971 EXTREMITY STUDY: CPT | Mod: 26,RT | Performed by: INTERNAL MEDICINE

## 2025-08-18 PROCEDURE — 700105 HCHG RX REV CODE 258: Performed by: HOSPITALIST

## 2025-08-18 PROCEDURE — 700102 HCHG RX REV CODE 250 W/ 637 OVERRIDE(OP)

## 2025-08-18 PROCEDURE — 85520 HEPARIN ASSAY: CPT

## 2025-08-18 PROCEDURE — 700101 HCHG RX REV CODE 250: Performed by: HOSPITALIST

## 2025-08-18 PROCEDURE — 99291 CRITICAL CARE FIRST HOUR: CPT | Performed by: STUDENT IN AN ORGANIZED HEALTH CARE EDUCATION/TRAINING PROGRAM

## 2025-08-18 PROCEDURE — A9270 NON-COVERED ITEM OR SERVICE: HCPCS | Performed by: NURSE PRACTITIONER

## 2025-08-18 PROCEDURE — 85610 PROTHROMBIN TIME: CPT

## 2025-08-18 PROCEDURE — 93971 EXTREMITY STUDY: CPT | Mod: RT

## 2025-08-18 PROCEDURE — A9270 NON-COVERED ITEM OR SERVICE: HCPCS | Performed by: INTERNAL MEDICINE

## 2025-08-18 PROCEDURE — 700102 HCHG RX REV CODE 250 W/ 637 OVERRIDE(OP): Performed by: INTERNAL MEDICINE

## 2025-08-18 PROCEDURE — 99232 SBSQ HOSP IP/OBS MODERATE 35: CPT | Performed by: HOSPITALIST

## 2025-08-18 PROCEDURE — 82962 GLUCOSE BLOOD TEST: CPT | Performed by: HOSPITALIST

## 2025-08-18 PROCEDURE — 36415 COLL VENOUS BLD VENIPUNCTURE: CPT

## 2025-08-18 PROCEDURE — 770006 HCHG ROOM/CARE - MED/SURG/GYN SEMI*

## 2025-08-18 PROCEDURE — A9270 NON-COVERED ITEM OR SERVICE: HCPCS | Performed by: HOSPITALIST

## 2025-08-18 PROCEDURE — 85027 COMPLETE CBC AUTOMATED: CPT

## 2025-08-18 PROCEDURE — 700111 HCHG RX REV CODE 636 W/ 250 OVERRIDE (IP): Mod: JZ | Performed by: HOSPITALIST

## 2025-08-18 PROCEDURE — 80048 BASIC METABOLIC PNL TOTAL CA: CPT

## 2025-08-18 PROCEDURE — 700102 HCHG RX REV CODE 250 W/ 637 OVERRIDE(OP): Performed by: NURSE PRACTITIONER

## 2025-08-18 PROCEDURE — A9270 NON-COVERED ITEM OR SERVICE: HCPCS

## 2025-08-18 PROCEDURE — 99222 1ST HOSP IP/OBS MODERATE 55: CPT | Performed by: STUDENT IN AN ORGANIZED HEALTH CARE EDUCATION/TRAINING PROGRAM

## 2025-08-18 RX ORDER — HEPARIN SODIUM 1000 [USP'U]/ML
80 INJECTION, SOLUTION INTRAVENOUS; SUBCUTANEOUS ONCE
Status: COMPLETED | OUTPATIENT
Start: 2025-08-19 | End: 2025-08-19

## 2025-08-18 RX ORDER — HEPARIN SODIUM 5000 [USP'U]/100ML
0-30 INJECTION, SOLUTION INTRAVENOUS CONTINUOUS
Status: DISCONTINUED | OUTPATIENT
Start: 2025-08-18 | End: 2025-08-18

## 2025-08-18 RX ORDER — HEPARIN SODIUM 1000 [USP'U]/ML
40 INJECTION, SOLUTION INTRAVENOUS; SUBCUTANEOUS PRN
Status: DISCONTINUED | OUTPATIENT
Start: 2025-08-19 | End: 2025-08-19

## 2025-08-18 RX ORDER — HEPARIN SODIUM 1000 [USP'U]/ML
40 INJECTION, SOLUTION INTRAVENOUS; SUBCUTANEOUS PRN
Status: DISCONTINUED | OUTPATIENT
Start: 2025-08-19 | End: 2025-08-18

## 2025-08-18 RX ORDER — LOSARTAN POTASSIUM 50 MG/1
50 TABLET ORAL
Status: DISCONTINUED | OUTPATIENT
Start: 2025-08-18 | End: 2025-08-19

## 2025-08-18 RX ORDER — HEPARIN SODIUM 5000 [USP'U]/100ML
0-30 INJECTION, SOLUTION INTRAVENOUS CONTINUOUS
Status: DISCONTINUED | OUTPATIENT
Start: 2025-08-19 | End: 2025-08-19

## 2025-08-18 RX ORDER — HEPARIN SODIUM 1000 [USP'U]/ML
80 INJECTION, SOLUTION INTRAVENOUS; SUBCUTANEOUS ONCE
Status: DISCONTINUED | OUTPATIENT
Start: 2025-08-18 | End: 2025-08-18

## 2025-08-18 RX ADMIN — CALCIUM POLYCARBOPHIL 625 MG: 625 TABLET, FILM COATED ORAL at 11:56

## 2025-08-18 RX ADMIN — CALCIUM POLYCARBOPHIL 625 MG: 625 TABLET, FILM COATED ORAL at 08:31

## 2025-08-18 RX ADMIN — GABAPENTIN 100 MG: 100 CAPSULE ORAL at 05:23

## 2025-08-18 RX ADMIN — DEXTROSE MONOHYDRATE 25 G: 25 INJECTION, SOLUTION INTRAVENOUS at 03:34

## 2025-08-18 RX ADMIN — INSULIN LISPRO 3 UNITS: 100 INJECTION, SOLUTION INTRAVENOUS; SUBCUTANEOUS at 22:46

## 2025-08-18 RX ADMIN — AMPICILLIN AND SULBACTAM 3 G: 1; 2 INJECTION, POWDER, FOR SOLUTION INTRAMUSCULAR; INTRAVENOUS at 17:52

## 2025-08-18 RX ADMIN — AMPICILLIN AND SULBACTAM 3 G: 1; 2 INJECTION, POWDER, FOR SOLUTION INTRAMUSCULAR; INTRAVENOUS at 05:25

## 2025-08-18 RX ADMIN — ENOXAPARIN SODIUM 40 MG: 100 INJECTION SUBCUTANEOUS at 17:48

## 2025-08-18 RX ADMIN — ATORVASTATIN CALCIUM 10 MG: 10 TABLET, FILM COATED ORAL at 20:48

## 2025-08-18 RX ADMIN — GABAPENTIN 100 MG: 100 CAPSULE ORAL at 17:48

## 2025-08-18 RX ADMIN — AMPICILLIN AND SULBACTAM 3 G: 1; 2 INJECTION, POWDER, FOR SOLUTION INTRAMUSCULAR; INTRAVENOUS at 11:59

## 2025-08-18 RX ADMIN — NICOTINE 21 MG: 21 PATCH TRANSDERMAL at 05:23

## 2025-08-18 RX ADMIN — NICOTINE POLACRILEX 2 MG: 2 GUM, CHEWING BUCCAL at 12:04

## 2025-08-18 RX ADMIN — NICOTINE POLACRILEX 2 MG: 2 GUM, CHEWING BUCCAL at 15:22

## 2025-08-18 RX ADMIN — GABAPENTIN 100 MG: 100 CAPSULE ORAL at 11:56

## 2025-08-18 RX ADMIN — LOSARTAN POTASSIUM 50 MG: 50 TABLET, FILM COATED ORAL at 16:48

## 2025-08-18 RX ADMIN — TRAMADOL HYDROCHLORIDE 50 MG: 50 TABLET, COATED ORAL at 12:02

## 2025-08-18 RX ADMIN — CALCIUM POLYCARBOPHIL 625 MG: 625 TABLET, FILM COATED ORAL at 17:48

## 2025-08-18 RX ADMIN — INSULIN GLARGINE-YFGN 22 UNITS: 100 INJECTION, SOLUTION SUBCUTANEOUS at 17:54

## 2025-08-18 RX ADMIN — INSULIN LISPRO 2 UNITS: 100 INJECTION, SOLUTION INTRAVENOUS; SUBCUTANEOUS at 17:56

## 2025-08-18 ASSESSMENT — COGNITIVE AND FUNCTIONAL STATUS - GENERAL
CLIMB 3 TO 5 STEPS WITH RAILING: A LITTLE
SUGGESTED CMS G CODE MODIFIER MOBILITY: CI
DAILY ACTIVITIY SCORE: 24
SUGGESTED CMS G CODE MODIFIER DAILY ACTIVITY: CH
MOBILITY SCORE: 23

## 2025-08-18 ASSESSMENT — PAIN DESCRIPTION - PAIN TYPE
TYPE: ACUTE PAIN

## 2025-08-18 ASSESSMENT — FIBROSIS 4 INDEX: FIB4 SCORE: 4.54

## 2025-08-19 ENCOUNTER — PHARMACY VISIT (OUTPATIENT)
Dept: PHARMACY | Facility: MEDICAL CENTER | Age: 55
End: 2025-08-19
Payer: COMMERCIAL

## 2025-08-19 VITALS
RESPIRATION RATE: 16 BRPM | DIASTOLIC BLOOD PRESSURE: 66 MMHG | HEART RATE: 89 BPM | TEMPERATURE: 98 F | WEIGHT: 183.86 LBS | HEIGHT: 73 IN | SYSTOLIC BLOOD PRESSURE: 170 MMHG | OXYGEN SATURATION: 95 % | BODY MASS INDEX: 24.37 KG/M2

## 2025-08-19 PROBLEM — O22.30 DVT (DEEP VEIN THROMBOSIS) IN PREGNANCY: Status: ACTIVE | Noted: 2025-08-19

## 2025-08-19 LAB
ANION GAP SERPL CALC-SCNC: 12 MMOL/L (ref 7–16)
BUN SERPL-MCNC: 20 MG/DL (ref 8–22)
CALCIUM SERPL-MCNC: 8.2 MG/DL (ref 8.5–10.5)
CHLORIDE SERPL-SCNC: 100 MMOL/L (ref 96–112)
CO2 SERPL-SCNC: 22 MMOL/L (ref 20–33)
CREAT SERPL-MCNC: 1.28 MG/DL (ref 0.5–1.4)
ERYTHROCYTE [DISTWIDTH] IN BLOOD BY AUTOMATED COUNT: 40.2 FL (ref 35.9–50)
GFR SERPLBLD CREATININE-BSD FMLA CKD-EPI: 66 ML/MIN/1.73 M 2
GLUCOSE BLD STRIP.AUTO-MCNC: 110 MG/DL (ref 65–99)
GLUCOSE BLD STRIP.AUTO-MCNC: 152 MG/DL (ref 65–99)
GLUCOSE BLD STRIP.AUTO-MCNC: 77 MG/DL (ref 65–99)
GLUCOSE SERPL-MCNC: 157 MG/DL (ref 65–99)
HCT VFR BLD AUTO: 37.4 % (ref 42–52)
HGB BLD-MCNC: 11.8 G/DL (ref 14–18)
MAGNESIUM SERPL-MCNC: 2 MG/DL (ref 1.5–2.5)
MCH RBC QN AUTO: 27 PG (ref 27–33)
MCHC RBC AUTO-ENTMCNC: 31.6 G/DL (ref 32.3–36.5)
MCV RBC AUTO: 85.6 FL (ref 81.4–97.8)
PHOSPHATE SERPL-MCNC: 2.9 MG/DL (ref 2.5–4.5)
PLATELET # BLD AUTO: 112 K/UL (ref 164–446)
PMV BLD AUTO: 10.7 FL (ref 9–12.9)
POTASSIUM SERPL-SCNC: 3.6 MMOL/L (ref 3.6–5.5)
RBC # BLD AUTO: 4.37 M/UL (ref 4.7–6.1)
SODIUM SERPL-SCNC: 134 MMOL/L (ref 135–145)
UFH PPP CHRO-ACNC: >1.1 IU/ML
WBC # BLD AUTO: 4.2 K/UL (ref 4.8–10.8)

## 2025-08-19 PROCEDURE — A9270 NON-COVERED ITEM OR SERVICE: HCPCS | Performed by: HOSPITALIST

## 2025-08-19 PROCEDURE — 85520 HEPARIN ASSAY: CPT

## 2025-08-19 PROCEDURE — A9270 NON-COVERED ITEM OR SERVICE: HCPCS | Performed by: STUDENT IN AN ORGANIZED HEALTH CARE EDUCATION/TRAINING PROGRAM

## 2025-08-19 PROCEDURE — 700102 HCHG RX REV CODE 250 W/ 637 OVERRIDE(OP): Performed by: INTERNAL MEDICINE

## 2025-08-19 PROCEDURE — 700111 HCHG RX REV CODE 636 W/ 250 OVERRIDE (IP): Mod: JZ | Performed by: HOSPITALIST

## 2025-08-19 PROCEDURE — 36415 COLL VENOUS BLD VENIPUNCTURE: CPT

## 2025-08-19 PROCEDURE — 83735 ASSAY OF MAGNESIUM: CPT

## 2025-08-19 PROCEDURE — A9270 NON-COVERED ITEM OR SERVICE: HCPCS | Performed by: INTERNAL MEDICINE

## 2025-08-19 PROCEDURE — A9270 NON-COVERED ITEM OR SERVICE: HCPCS

## 2025-08-19 PROCEDURE — 700105 HCHG RX REV CODE 258: Performed by: HOSPITALIST

## 2025-08-19 PROCEDURE — 700111 HCHG RX REV CODE 636 W/ 250 OVERRIDE (IP)

## 2025-08-19 PROCEDURE — 700102 HCHG RX REV CODE 250 W/ 637 OVERRIDE(OP): Performed by: HOSPITALIST

## 2025-08-19 PROCEDURE — 80048 BASIC METABOLIC PNL TOTAL CA: CPT

## 2025-08-19 PROCEDURE — RXMED WILLOW AMBULATORY MEDICATION CHARGE: Performed by: STUDENT IN AN ORGANIZED HEALTH CARE EDUCATION/TRAINING PROGRAM

## 2025-08-19 PROCEDURE — 700102 HCHG RX REV CODE 250 W/ 637 OVERRIDE(OP): Performed by: NURSE PRACTITIONER

## 2025-08-19 PROCEDURE — 700102 HCHG RX REV CODE 250 W/ 637 OVERRIDE(OP)

## 2025-08-19 PROCEDURE — 82962 GLUCOSE BLOOD TEST: CPT | Performed by: HOSPITALIST

## 2025-08-19 PROCEDURE — A9270 NON-COVERED ITEM OR SERVICE: HCPCS | Performed by: NURSE PRACTITIONER

## 2025-08-19 PROCEDURE — 85027 COMPLETE CBC AUTOMATED: CPT

## 2025-08-19 PROCEDURE — 99239 HOSP IP/OBS DSCHRG MGMT >30: CPT | Performed by: STUDENT IN AN ORGANIZED HEALTH CARE EDUCATION/TRAINING PROGRAM

## 2025-08-19 PROCEDURE — 700102 HCHG RX REV CODE 250 W/ 637 OVERRIDE(OP): Performed by: STUDENT IN AN ORGANIZED HEALTH CARE EDUCATION/TRAINING PROGRAM

## 2025-08-19 PROCEDURE — 84100 ASSAY OF PHOSPHORUS: CPT

## 2025-08-19 PROCEDURE — 82962 GLUCOSE BLOOD TEST: CPT | Performed by: STUDENT IN AN ORGANIZED HEALTH CARE EDUCATION/TRAINING PROGRAM

## 2025-08-19 RX ORDER — BLOOD-GLUCOSE METER
EACH MISCELLANEOUS
Qty: 1 KIT | Refills: 0 | Status: SHIPPED | OUTPATIENT
Start: 2025-08-19 | End: 2025-08-23

## 2025-08-19 RX ORDER — TRAMADOL HYDROCHLORIDE 50 MG/1
50 TABLET ORAL EVERY 6 HOURS PRN
Qty: 20 TABLET | Refills: 0 | Status: ON HOLD | OUTPATIENT
Start: 2025-08-19 | End: 2025-08-26

## 2025-08-19 RX ORDER — GLUCOSAMINE HCL/CHONDROITIN SU 500-400 MG
CAPSULE ORAL
Qty: 100 EACH | Refills: 0 | Status: SHIPPED | OUTPATIENT
Start: 2025-08-19 | End: 2025-08-23

## 2025-08-19 RX ORDER — LOSARTAN POTASSIUM 50 MG/1
50 TABLET ORAL ONCE
Status: COMPLETED | OUTPATIENT
Start: 2025-08-19 | End: 2025-08-19

## 2025-08-19 RX ORDER — AVOBENZONE, HOMOSALATE, OCTISALATE, OCTOCRYLENE 30; 40; 45; 26 MG/ML; MG/ML; MG/ML; MG/ML
CREAM TOPICAL
Qty: 100 EACH | Refills: 0 | Status: SHIPPED | OUTPATIENT
Start: 2025-08-19 | End: 2025-08-23

## 2025-08-19 RX ORDER — GABAPENTIN 100 MG/1
100 CAPSULE ORAL 3 TIMES DAILY
Qty: 90 CAPSULE | Refills: 2 | Status: ON HOLD | OUTPATIENT
Start: 2025-08-19

## 2025-08-19 RX ORDER — CALCIUM POLYCARBOPHIL 625 MG
625 TABLET ORAL
Qty: 30 TABLET | Refills: 0 | Status: SHIPPED | OUTPATIENT
Start: 2025-08-19 | End: 2025-08-23

## 2025-08-19 RX ORDER — NICOTINE 21 MG/24HR
1 PATCH, TRANSDERMAL 24 HOURS TRANSDERMAL EVERY 24 HOURS
Qty: 28 PATCH | Refills: 0 | Status: ON HOLD | OUTPATIENT
Start: 2025-08-19

## 2025-08-19 RX ORDER — INSULIN LISPRO 100 [IU]/ML
2-9 INJECTION, SOLUTION INTRAVENOUS; SUBCUTANEOUS
Qty: 27 ML | Refills: 2 | Status: ON HOLD | OUTPATIENT
Start: 2025-08-19

## 2025-08-19 RX ORDER — HYDRALAZINE HYDROCHLORIDE 20 MG/ML
10 INJECTION INTRAMUSCULAR; INTRAVENOUS EVERY 6 HOURS PRN
Status: DISCONTINUED | OUTPATIENT
Start: 2025-08-19 | End: 2025-08-19 | Stop reason: HOSPADM

## 2025-08-19 RX ORDER — LOSARTAN POTASSIUM 100 MG/1
100 TABLET ORAL DAILY
Qty: 100 TABLET | Refills: 3 | Status: ON HOLD | OUTPATIENT
Start: 2025-08-20 | End: 2026-09-24

## 2025-08-19 RX ORDER — INSULIN GLARGINE 100 [IU]/ML
22 INJECTION, SOLUTION SUBCUTANEOUS EVERY EVENING
Qty: 21 ML | Refills: 2 | Status: ON HOLD | OUTPATIENT
Start: 2025-08-19

## 2025-08-19 RX ORDER — SODIUM CHLORIDE 9 MG/ML
INJECTION, SOLUTION INTRAVENOUS CONTINUOUS
Status: DISCONTINUED | OUTPATIENT
Start: 2025-08-19 | End: 2025-08-19

## 2025-08-19 RX ORDER — BLOOD SUGAR DIAGNOSTIC
STRIP MISCELLANEOUS
Qty: 100 STRIP | Refills: 0 | Status: SHIPPED | OUTPATIENT
Start: 2025-08-19 | End: 2025-08-23

## 2025-08-19 RX ORDER — LOSARTAN POTASSIUM 50 MG/1
100 TABLET ORAL
Status: DISCONTINUED | OUTPATIENT
Start: 2025-08-20 | End: 2025-08-19 | Stop reason: HOSPADM

## 2025-08-19 RX ADMIN — HEPARIN SODIUM 18 UNITS/KG/HR: 5000 INJECTION, SOLUTION INTRAVENOUS at 00:26

## 2025-08-19 RX ADMIN — AMPICILLIN AND SULBACTAM 3 G: 1; 2 INJECTION, POWDER, FOR SOLUTION INTRAMUSCULAR; INTRAVENOUS at 12:54

## 2025-08-19 RX ADMIN — TRAMADOL HYDROCHLORIDE 50 MG: 50 TABLET, COATED ORAL at 08:21

## 2025-08-19 RX ADMIN — NICOTINE 21 MG: 21 PATCH TRANSDERMAL at 05:27

## 2025-08-19 RX ADMIN — HEPARIN SODIUM 6700 UNITS: 1000 INJECTION, SOLUTION INTRAVENOUS; SUBCUTANEOUS at 00:15

## 2025-08-19 RX ADMIN — AMPICILLIN AND SULBACTAM 3 G: 1; 2 INJECTION, POWDER, FOR SOLUTION INTRAMUSCULAR; INTRAVENOUS at 01:07

## 2025-08-19 RX ADMIN — SODIUM CHLORIDE: 9 INJECTION, SOLUTION INTRAVENOUS at 01:06

## 2025-08-19 RX ADMIN — LOSARTAN POTASSIUM 50 MG: 50 TABLET, FILM COATED ORAL at 05:26

## 2025-08-19 RX ADMIN — AMPICILLIN AND SULBACTAM 3 G: 1; 2 INJECTION, POWDER, FOR SOLUTION INTRAMUSCULAR; INTRAVENOUS at 05:31

## 2025-08-19 RX ADMIN — APIXABAN 10 MG: 5 TABLET, FILM COATED ORAL at 08:22

## 2025-08-19 RX ADMIN — LOSARTAN POTASSIUM 50 MG: 50 TABLET, FILM COATED ORAL at 08:22

## 2025-08-19 RX ADMIN — GABAPENTIN 100 MG: 100 CAPSULE ORAL at 05:26

## 2025-08-19 RX ADMIN — CALCIUM POLYCARBOPHIL 625 MG: 625 TABLET, FILM COATED ORAL at 08:23

## 2025-08-19 RX ADMIN — GABAPENTIN 100 MG: 100 CAPSULE ORAL at 12:49

## 2025-08-19 RX ADMIN — CALCIUM POLYCARBOPHIL 625 MG: 625 TABLET, FILM COATED ORAL at 12:46

## 2025-08-19 RX ADMIN — INSULIN LISPRO 2 UNITS: 100 INJECTION, SOLUTION INTRAVENOUS; SUBCUTANEOUS at 08:27

## 2025-08-19 ASSESSMENT — PAIN DESCRIPTION - PAIN TYPE
TYPE: ACUTE PAIN
TYPE: ACUTE PAIN

## 2025-08-20 LAB
BACTERIA BLD CULT: NORMAL
SIGNIFICANT IND 70042: NORMAL
SITE SITE: NORMAL
SOURCE SOURCE: NORMAL

## 2025-08-23 ENCOUNTER — HOSPITAL ENCOUNTER (INPATIENT)
Facility: MEDICAL CENTER | Age: 55
End: 2025-08-23
Attending: EMERGENCY MEDICINE | Admitting: STUDENT IN AN ORGANIZED HEALTH CARE EDUCATION/TRAINING PROGRAM
Payer: MEDICAID

## 2025-08-23 DIAGNOSIS — N17.9 AKI (ACUTE KIDNEY INJURY) (HCC): ICD-10-CM

## 2025-08-23 DIAGNOSIS — Z91.199 NON-COMPLIANCE: ICD-10-CM

## 2025-08-23 DIAGNOSIS — G89.4 CHRONIC PAIN SYNDROME: ICD-10-CM

## 2025-08-23 DIAGNOSIS — A41.9 SEPSIS, DUE TO UNSPECIFIED ORGANISM, UNSPECIFIED WHETHER ACUTE ORGAN DYSFUNCTION PRESENT (HCC): ICD-10-CM

## 2025-08-23 DIAGNOSIS — O22.30 DVT (DEEP VEIN THROMBOSIS) IN PREGNANCY: ICD-10-CM

## 2025-08-23 DIAGNOSIS — I82.621 ACUTE DEEP VEIN THROMBOSIS (DVT) OF BRACHIAL VEIN OF RIGHT UPPER EXTREMITY (HCC): Primary | ICD-10-CM

## 2025-08-23 DIAGNOSIS — S81.801A NON-HEALING WOUND OF RIGHT LOWER EXTREMITY: ICD-10-CM

## 2025-08-23 DIAGNOSIS — G93.41 ACUTE METABOLIC ENCEPHALOPATHY: ICD-10-CM

## 2025-08-23 DIAGNOSIS — I99.8 VASCULAR INSUFFICIENCY OF EXTREMITY: ICD-10-CM

## 2025-08-23 DIAGNOSIS — F19.10 SUBSTANCE ABUSE (HCC): ICD-10-CM

## 2025-08-23 DIAGNOSIS — R60.0 LOWER EXTREMITY EDEMA: ICD-10-CM

## 2025-08-23 DIAGNOSIS — S72.002A CLOSED FRACTURE OF LEFT HIP, INITIAL ENCOUNTER (HCC): ICD-10-CM

## 2025-08-23 DIAGNOSIS — Z86.14 HX MRSA INFECTION: ICD-10-CM

## 2025-08-23 DIAGNOSIS — Z89.511 STATUS POST BELOW-KNEE AMPUTATION OF RIGHT LOWER EXTREMITY (HCC): ICD-10-CM

## 2025-08-23 DIAGNOSIS — R74.01 TRANSAMINITIS: ICD-10-CM

## 2025-08-23 DIAGNOSIS — F17.200 TOBACCO USE DISORDER: ICD-10-CM

## 2025-08-23 DIAGNOSIS — R80.9 MICROALBUMINURIA: ICD-10-CM

## 2025-08-23 DIAGNOSIS — I10 PRIMARY HYPERTENSION: ICD-10-CM

## 2025-08-23 DIAGNOSIS — E10.10 DKA, TYPE 1, NOT AT GOAL (HCC): ICD-10-CM

## 2025-08-23 DIAGNOSIS — Z91.199 NONCOMPLIANCE: ICD-10-CM

## 2025-08-23 LAB
ALBUMIN SERPL BCP-MCNC: 3.2 G/DL (ref 3.2–4.9)
ALBUMIN/GLOB SERPL: 1 G/DL
ALP SERPL-CCNC: 166 U/L (ref 30–99)
ALT SERPL-CCNC: 43 U/L (ref 2–50)
ANION GAP SERPL CALC-SCNC: 37 MMOL/L (ref 7–16)
ANION GAP SERPL CALC-SCNC: 40 MMOL/L (ref 7–16)
APPEARANCE UR: ABNORMAL
AST SERPL-CCNC: 29 U/L (ref 12–45)
B-OH-BUTYR SERPL-MCNC: >9 MMOL/L (ref 0.02–0.27)
BACTERIA #/AREA URNS HPF: ABNORMAL /HPF
BASE EXCESS BLDV CALC-SCNC: -27 MMOL/L (ref -2–3)
BASOPHILS # BLD AUTO: 0 % (ref 0–1.8)
BASOPHILS # BLD: 0 K/UL (ref 0–0.12)
BILIRUB SERPL-MCNC: 0.2 MG/DL (ref 0.1–1.5)
BILIRUB UR QL STRIP.AUTO: NEGATIVE
BODY TEMPERATURE: 36 CENTIGRADE
BUN SERPL-MCNC: 46 MG/DL (ref 8–22)
BUN SERPL-MCNC: 47 MG/DL (ref 8–22)
BURR CELLS BLD QL SMEAR: NORMAL
CALCIUM ALBUM COR SERPL-MCNC: 9 MG/DL (ref 8.5–10.5)
CALCIUM SERPL-MCNC: 7.4 MG/DL (ref 8.5–10.5)
CALCIUM SERPL-MCNC: 8.4 MG/DL (ref 8.5–10.5)
CASTS URNS QL MICRO: ABNORMAL /LPF (ref 0–2)
CHLORIDE SERPL-SCNC: 81 MMOL/L (ref 96–112)
CHLORIDE SERPL-SCNC: 87 MMOL/L (ref 96–112)
CO2 SERPL-SCNC: 2 MMOL/L (ref 20–33)
CO2 SERPL-SCNC: 3 MMOL/L (ref 20–33)
COLOR UR: YELLOW
CREAT SERPL-MCNC: 1.8 MG/DL (ref 0.5–1.4)
CREAT SERPL-MCNC: 1.85 MG/DL (ref 0.5–1.4)
CRP SERPL HS-MCNC: 1.06 MG/DL (ref 0–0.75)
EKG IMPRESSION: NORMAL
EOSINOPHIL # BLD AUTO: 0 K/UL (ref 0–0.51)
EOSINOPHIL NFR BLD: 0 % (ref 0–6.9)
EPITHELIAL CELLS 1715: ABNORMAL /HPF (ref 0–5)
ERYTHROCYTE [DISTWIDTH] IN BLOOD BY AUTOMATED COUNT: 43.8 FL (ref 35.9–50)
ERYTHROCYTE [SEDIMENTATION RATE] IN BLOOD BY WESTERGREN METHOD: 14 MM/HOUR (ref 0–20)
GFR SERPLBLD CREATININE-BSD FMLA CKD-EPI: 42 ML/MIN/1.73 M 2
GFR SERPLBLD CREATININE-BSD FMLA CKD-EPI: 44 ML/MIN/1.73 M 2
GLOBULIN SER CALC-MCNC: 3.2 G/DL (ref 1.9–3.5)
GLUCOSE BLD STRIP.AUTO-MCNC: 571 MG/DL (ref 65–99)
GLUCOSE BLD STRIP.AUTO-MCNC: >600 MG/DL (ref 65–99)
GLUCOSE SERPL-MCNC: 666 MG/DL (ref 65–99)
GLUCOSE SERPL-MCNC: 701 MG/DL (ref 65–99)
GLUCOSE SERPL-MCNC: 725 MG/DL (ref 65–99)
GLUCOSE SERPL-MCNC: 796 MG/DL (ref 65–99)
GLUCOSE UR STRIP.AUTO-MCNC: >=1000 MG/DL
GRANULAR CASTS  1716G: PRESENT /LPF
HCO3 BLDV-SCNC: 3 MMOL/L (ref 22–29)
HCT VFR BLD AUTO: 41.9 % (ref 42–52)
HGB BLD-MCNC: 12.4 G/DL (ref 14–18)
HYALINE CAST   1831: PRESENT /LPF
KETONES UR STRIP.AUTO-MCNC: 15 MG/DL
LACTATE SERPL-SCNC: 1.7 MMOL/L (ref 0.5–2)
LACTATE SERPL-SCNC: 2.4 MMOL/L (ref 0.5–2)
LEUKOCYTE ESTERASE UR QL STRIP.AUTO: NEGATIVE
LYMPHOCYTES # BLD AUTO: 0.77 K/UL (ref 1–4.8)
LYMPHOCYTES NFR BLD: 4.3 % (ref 22–41)
MAGNESIUM SERPL-MCNC: 2.4 MG/DL (ref 1.5–2.5)
MANUAL DIFF BLD: NORMAL
MCH RBC QN AUTO: 27.4 PG (ref 27–33)
MCHC RBC AUTO-ENTMCNC: 29.6 G/DL (ref 32.3–36.5)
MCV RBC AUTO: 92.7 FL (ref 81.4–97.8)
METAMYELOCYTES NFR BLD MANUAL: 0.9 % (ref 0–1)
MICRO URNS: ABNORMAL
MONOCYTES # BLD AUTO: 1.1 K/UL (ref 0–0.85)
MONOCYTES NFR BLD AUTO: 6.1 % (ref 0–13.4)
MORPHOLOGY BLD-IMP: NORMAL
NEUTROPHILS # BLD AUTO: 15.97 K/UL (ref 1.82–7.42)
NEUTROPHILS NFR BLD: 87.8 % (ref 44–72)
NEUTS BAND NFR BLD MANUAL: 0.9 % (ref 0–10)
NITRITE UR QL STRIP.AUTO: NEGATIVE
NRBC # BLD AUTO: 0 K/UL
NRBC BLD-RTO: 0 /100 WBC (ref 0–0.2)
PCO2 BLDV: 14.1 MMHG (ref 38–54)
PCO2 TEMP ADJ BLDV: 13.5 MMHG (ref 38–54)
PH BLDV: 6.95 [PH] (ref 7.31–7.45)
PH TEMP ADJ BLDV: 6.97 [PH] (ref 7.31–7.45)
PH UR STRIP.AUTO: 5 [PH] (ref 5–8)
PLATELET # BLD AUTO: 549 K/UL (ref 164–446)
PLATELET BLD QL SMEAR: NORMAL
PMV BLD AUTO: 10.2 FL (ref 9–12.9)
PO2 BLDV: 44 MMHG (ref 23–48)
PO2 TEMP ADJ BLDV: 41 MMHG (ref 23–48)
POIKILOCYTOSIS BLD QL SMEAR: NORMAL
POTASSIUM SERPL-SCNC: 4.4 MMOL/L (ref 3.6–5.5)
POTASSIUM SERPL-SCNC: 4.5 MMOL/L (ref 3.6–5.5)
POTASSIUM SERPL-SCNC: 5.3 MMOL/L (ref 3.6–5.5)
PROT SERPL-MCNC: 6.4 G/DL (ref 6–8.2)
PROT UR QL STRIP: 100 MG/DL
RBC # BLD AUTO: 4.52 M/UL (ref 4.7–6.1)
RBC # URNS HPF: ABNORMAL /HPF (ref 0–2)
RBC BLD AUTO: PRESENT
RBC UR QL AUTO: NEGATIVE
SAO2 % BLDV: 59 % (ref 60–85)
SODIUM SERPL-SCNC: 123 MMOL/L (ref 135–145)
SODIUM SERPL-SCNC: 127 MMOL/L (ref 135–145)
SP GR UR STRIP.AUTO: 1.02
UROBILINOGEN UR STRIP.AUTO-MCNC: 0.2 EU/DL
WBC # BLD AUTO: 18 K/UL (ref 4.8–10.8)
WBC #/AREA URNS HPF: ABNORMAL /HPF

## 2025-08-23 PROCEDURE — 770022 HCHG ROOM/CARE - ICU (200)

## 2025-08-23 PROCEDURE — 36415 COLL VENOUS BLD VENIPUNCTURE: CPT

## 2025-08-23 PROCEDURE — 93005 ELECTROCARDIOGRAM TRACING: CPT | Mod: TC | Performed by: EMERGENCY MEDICINE

## 2025-08-23 PROCEDURE — 700105 HCHG RX REV CODE 258: Mod: UD | Performed by: EMERGENCY MEDICINE

## 2025-08-23 PROCEDURE — 82962 GLUCOSE BLOOD TEST: CPT | Performed by: EMERGENCY MEDICINE

## 2025-08-23 PROCEDURE — 700111 HCHG RX REV CODE 636 W/ 250 OVERRIDE (IP): Mod: JZ | Performed by: EMERGENCY MEDICINE

## 2025-08-23 PROCEDURE — 81001 URINALYSIS AUTO W/SCOPE: CPT

## 2025-08-23 PROCEDURE — 85027 COMPLETE CBC AUTOMATED: CPT

## 2025-08-23 PROCEDURE — 96365 THER/PROPH/DIAG IV INF INIT: CPT

## 2025-08-23 PROCEDURE — 700105 HCHG RX REV CODE 258: Performed by: STUDENT IN AN ORGANIZED HEALTH CARE EDUCATION/TRAINING PROGRAM

## 2025-08-23 PROCEDURE — 82947 ASSAY GLUCOSE BLOOD QUANT: CPT

## 2025-08-23 PROCEDURE — 99291 CRITICAL CARE FIRST HOUR: CPT

## 2025-08-23 PROCEDURE — 82010 KETONE BODYS QUAN: CPT

## 2025-08-23 PROCEDURE — 87040 BLOOD CULTURE FOR BACTERIA: CPT | Mod: 91

## 2025-08-23 PROCEDURE — 96368 THER/DIAG CONCURRENT INF: CPT

## 2025-08-23 PROCEDURE — 80053 COMPREHEN METABOLIC PANEL: CPT

## 2025-08-23 PROCEDURE — 85652 RBC SED RATE AUTOMATED: CPT

## 2025-08-23 PROCEDURE — 84132 ASSAY OF SERUM POTASSIUM: CPT

## 2025-08-23 PROCEDURE — 80048 BASIC METABOLIC PNL TOTAL CA: CPT

## 2025-08-23 PROCEDURE — 82962 GLUCOSE BLOOD TEST: CPT | Performed by: STUDENT IN AN ORGANIZED HEALTH CARE EDUCATION/TRAINING PROGRAM

## 2025-08-23 PROCEDURE — 87086 URINE CULTURE/COLONY COUNT: CPT

## 2025-08-23 PROCEDURE — 83735 ASSAY OF MAGNESIUM: CPT

## 2025-08-23 PROCEDURE — 83605 ASSAY OF LACTIC ACID: CPT

## 2025-08-23 PROCEDURE — 96375 TX/PRO/DX INJ NEW DRUG ADDON: CPT

## 2025-08-23 PROCEDURE — 93005 ELECTROCARDIOGRAM TRACING: CPT | Mod: TC

## 2025-08-23 PROCEDURE — 85007 BL SMEAR W/DIFF WBC COUNT: CPT

## 2025-08-23 PROCEDURE — 700111 HCHG RX REV CODE 636 W/ 250 OVERRIDE (IP): Performed by: STUDENT IN AN ORGANIZED HEALTH CARE EDUCATION/TRAINING PROGRAM

## 2025-08-23 PROCEDURE — 700102 HCHG RX REV CODE 250 W/ 637 OVERRIDE(OP): Performed by: EMERGENCY MEDICINE

## 2025-08-23 PROCEDURE — 86140 C-REACTIVE PROTEIN: CPT

## 2025-08-23 PROCEDURE — 82803 BLOOD GASES ANY COMBINATION: CPT

## 2025-08-23 RX ORDER — MAGNESIUM SULFATE HEPTAHYDRATE 40 MG/ML
2 INJECTION, SOLUTION INTRAVENOUS
Status: COMPLETED | OUTPATIENT
Start: 2025-08-23 | End: 2025-08-24

## 2025-08-23 RX ORDER — SODIUM CHLORIDE, SODIUM LACTATE, POTASSIUM CHLORIDE, AND CALCIUM CHLORIDE .6; .31; .03; .02 G/100ML; G/100ML; G/100ML; G/100ML
2000 INJECTION, SOLUTION INTRAVENOUS ONCE
Status: COMPLETED | OUTPATIENT
Start: 2025-08-23 | End: 2025-08-23

## 2025-08-23 RX ORDER — ONDANSETRON 2 MG/ML
4 INJECTION INTRAMUSCULAR; INTRAVENOUS ONCE
Status: COMPLETED | OUTPATIENT
Start: 2025-08-23 | End: 2025-08-23

## 2025-08-23 RX ORDER — HEPARIN SODIUM 5000 [USP'U]/ML
5000 INJECTION, SOLUTION INTRAVENOUS; SUBCUTANEOUS EVERY 8 HOURS
Status: DISCONTINUED | OUTPATIENT
Start: 2025-08-23 | End: 2025-08-23

## 2025-08-23 RX ORDER — POTASSIUM CHLORIDE 7.45 MG/ML
10 INJECTION INTRAVENOUS ONCE
Status: COMPLETED | OUTPATIENT
Start: 2025-08-23 | End: 2025-08-23

## 2025-08-23 RX ORDER — NICOTINE 21 MG/24HR
1 PATCH, TRANSDERMAL 24 HOURS TRANSDERMAL EVERY 24 HOURS
Status: DISPENSED | OUTPATIENT
Start: 2025-08-24

## 2025-08-23 RX ORDER — DEXTROSE, SODIUM CHLORIDE, SODIUM LACTATE, POTASSIUM CHLORIDE, AND CALCIUM CHLORIDE 5; .6; .31; .03; .02 G/100ML; G/100ML; G/100ML; G/100ML; G/100ML
INJECTION, SOLUTION INTRAVENOUS CONTINUOUS
Status: DISCONTINUED | OUTPATIENT
Start: 2025-08-23 | End: 2025-08-24

## 2025-08-23 RX ORDER — INDOMETHACIN 25 MG/1
100 CAPSULE ORAL ONCE
Status: COMPLETED | OUTPATIENT
Start: 2025-08-23 | End: 2025-08-23

## 2025-08-23 RX ORDER — INDOMETHACIN 25 MG/1
50 CAPSULE ORAL ONCE
Status: COMPLETED | OUTPATIENT
Start: 2025-08-23 | End: 2025-08-24

## 2025-08-23 RX ORDER — PROMETHAZINE HYDROCHLORIDE 25 MG/1
12.5-25 TABLET ORAL EVERY 4 HOURS PRN
Status: ACTIVE | OUTPATIENT
Start: 2025-08-23

## 2025-08-23 RX ORDER — SODIUM CHLORIDE 9 MG/ML
30 INJECTION, SOLUTION INTRAVENOUS ONCE
Status: ACTIVE | OUTPATIENT
Start: 2025-08-23 | End: 2025-08-24

## 2025-08-23 RX ORDER — SODIUM CHLORIDE 9 MG/ML
1000 INJECTION, SOLUTION INTRAVENOUS ONCE
Status: ACTIVE | OUTPATIENT
Start: 2025-08-23 | End: 2025-08-24

## 2025-08-23 RX ORDER — MAGNESIUM SULFATE HEPTAHYDRATE 40 MG/ML
4 INJECTION, SOLUTION INTRAVENOUS
Status: COMPLETED | OUTPATIENT
Start: 2025-08-23 | End: 2025-08-24

## 2025-08-23 RX ORDER — PROMETHAZINE HYDROCHLORIDE 25 MG/1
12.5-25 SUPPOSITORY RECTAL EVERY 4 HOURS PRN
Status: ACTIVE | OUTPATIENT
Start: 2025-08-23

## 2025-08-23 RX ORDER — ONDANSETRON 4 MG/1
4 TABLET, ORALLY DISINTEGRATING ORAL EVERY 4 HOURS PRN
Status: ACTIVE | OUTPATIENT
Start: 2025-08-23

## 2025-08-23 RX ORDER — SODIUM CHLORIDE 9 MG/ML
1000 INJECTION, SOLUTION INTRAVENOUS ONCE
Status: COMPLETED | OUTPATIENT
Start: 2025-08-23 | End: 2025-08-23

## 2025-08-23 RX ORDER — POTASSIUM CHLORIDE 7.45 MG/ML
10 INJECTION INTRAVENOUS
Status: COMPLETED | OUTPATIENT
Start: 2025-08-24 | End: 2025-08-24

## 2025-08-23 RX ORDER — AMOXICILLIN 250 MG
2 CAPSULE ORAL EVERY EVENING
Status: DISPENSED | OUTPATIENT
Start: 2025-08-23

## 2025-08-23 RX ORDER — ACETAMINOPHEN 500 MG
1000 TABLET ORAL EVERY 6 HOURS PRN
Status: DISPENSED | OUTPATIENT
Start: 2025-08-23

## 2025-08-23 RX ORDER — DEXTROSE AND SODIUM CHLORIDE 10; .45 G/100ML; G/100ML
INJECTION, SOLUTION INTRAVENOUS CONTINUOUS
Status: ACTIVE | OUTPATIENT
Start: 2025-08-23 | End: 2025-08-24

## 2025-08-23 RX ORDER — SODIUM CHLORIDE, SODIUM LACTATE, POTASSIUM CHLORIDE, CALCIUM CHLORIDE 600; 310; 30; 20 MG/100ML; MG/100ML; MG/100ML; MG/100ML
INJECTION, SOLUTION INTRAVENOUS CONTINUOUS
Status: DISCONTINUED | OUTPATIENT
Start: 2025-08-23 | End: 2025-08-24

## 2025-08-23 RX ORDER — POLYETHYLENE GLYCOL 3350 17 G/17G
1 POWDER, FOR SOLUTION ORAL
Status: ACTIVE | OUTPATIENT
Start: 2025-08-23

## 2025-08-23 RX ORDER — ONDANSETRON 2 MG/ML
4 INJECTION INTRAMUSCULAR; INTRAVENOUS EVERY 4 HOURS PRN
Status: ACTIVE | OUTPATIENT
Start: 2025-08-23

## 2025-08-23 RX ORDER — PROCHLORPERAZINE EDISYLATE 5 MG/ML
5-10 INJECTION INTRAMUSCULAR; INTRAVENOUS EVERY 4 HOURS PRN
Status: ACTIVE | OUTPATIENT
Start: 2025-08-23

## 2025-08-23 RX ADMIN — SODIUM CHLORIDE, POTASSIUM CHLORIDE, SODIUM LACTATE AND CALCIUM CHLORIDE: 600; 310; 30; 20 INJECTION, SOLUTION INTRAVENOUS at 20:35

## 2025-08-23 RX ADMIN — SODIUM BICARBONATE 100 MEQ: 84 INJECTION INTRAVENOUS at 18:50

## 2025-08-23 RX ADMIN — SODIUM CHLORIDE 1000 ML: 9 INJECTION, SOLUTION INTRAVENOUS at 18:36

## 2025-08-23 RX ADMIN — POTASSIUM CHLORIDE 10 MEQ: 7.46 INJECTION, SOLUTION INTRAVENOUS at 20:41

## 2025-08-23 RX ADMIN — PIPERACILLIN AND TAZOBACTAM 3.38 G: 3; .375 INJECTION, POWDER, FOR SOLUTION INTRAVENOUS at 18:32

## 2025-08-23 RX ADMIN — SODIUM CHLORIDE 1000 ML: 9 INJECTION, SOLUTION INTRAVENOUS at 17:26

## 2025-08-23 RX ADMIN — SODIUM CHLORIDE, POTASSIUM CHLORIDE, SODIUM LACTATE AND CALCIUM CHLORIDE 2000 ML: 600; 310; 30; 20 INJECTION, SOLUTION INTRAVENOUS at 19:27

## 2025-08-23 RX ADMIN — ONDANSETRON 4 MG: 2 INJECTION INTRAMUSCULAR; INTRAVENOUS at 18:34

## 2025-08-23 RX ADMIN — SODIUM CHLORIDE 8 UNITS/HR: 9 INJECTION, SOLUTION INTRAVENOUS at 18:46

## 2025-08-23 ASSESSMENT — FIBROSIS 4 INDEX
FIB4 SCORE: 0.44
FIB4 SCORE: 4.62

## 2025-08-23 ASSESSMENT — PAIN DESCRIPTION - PAIN TYPE
TYPE: ACUTE PAIN
TYPE: ACUTE PAIN

## 2025-08-24 LAB
ANION GAP SERPL CALC-SCNC: 11 MMOL/L (ref 7–16)
ANION GAP SERPL CALC-SCNC: 12 MMOL/L (ref 7–16)
ANION GAP SERPL CALC-SCNC: 17 MMOL/L (ref 7–16)
ANION GAP SERPL CALC-SCNC: 23 MMOL/L (ref 7–16)
B-OH-BUTYR SERPL-MCNC: 6.23 MMOL/L (ref 0.02–0.27)
BUN SERPL-MCNC: 41 MG/DL (ref 8–22)
BUN SERPL-MCNC: 45 MG/DL (ref 8–22)
BUN SERPL-MCNC: 46 MG/DL (ref 8–22)
BUN SERPL-MCNC: 47 MG/DL (ref 8–22)
CALCIUM SERPL-MCNC: 7 MG/DL (ref 8.5–10.5)
CALCIUM SERPL-MCNC: 7.1 MG/DL (ref 8.5–10.5)
CALCIUM SERPL-MCNC: 7.3 MG/DL (ref 8.5–10.5)
CALCIUM SERPL-MCNC: 7.3 MG/DL (ref 8.5–10.5)
CHLORIDE SERPL-SCNC: 101 MMOL/L (ref 96–112)
CHLORIDE SERPL-SCNC: 102 MMOL/L (ref 96–112)
CHLORIDE SERPL-SCNC: 95 MMOL/L (ref 96–112)
CHLORIDE SERPL-SCNC: 97 MMOL/L (ref 96–112)
CO2 SERPL-SCNC: 12 MMOL/L (ref 20–33)
CO2 SERPL-SCNC: 17 MMOL/L (ref 20–33)
CO2 SERPL-SCNC: 21 MMOL/L (ref 20–33)
CO2 SERPL-SCNC: 22 MMOL/L (ref 20–33)
CREAT SERPL-MCNC: 1.69 MG/DL (ref 0.5–1.4)
CREAT SERPL-MCNC: 1.78 MG/DL (ref 0.5–1.4)
CREAT SERPL-MCNC: 1.78 MG/DL (ref 0.5–1.4)
CREAT SERPL-MCNC: 1.84 MG/DL (ref 0.5–1.4)
ERYTHROCYTE [DISTWIDTH] IN BLOOD BY AUTOMATED COUNT: 36.8 FL (ref 35.9–50)
GFR SERPLBLD CREATININE-BSD FMLA CKD-EPI: 43 ML/MIN/1.73 M 2
GFR SERPLBLD CREATININE-BSD FMLA CKD-EPI: 44 ML/MIN/1.73 M 2
GFR SERPLBLD CREATININE-BSD FMLA CKD-EPI: 44 ML/MIN/1.73 M 2
GFR SERPLBLD CREATININE-BSD FMLA CKD-EPI: 47 ML/MIN/1.73 M 2
GLUCOSE BLD STRIP.AUTO-MCNC: 122 MG/DL (ref 65–99)
GLUCOSE BLD STRIP.AUTO-MCNC: 142 MG/DL (ref 65–99)
GLUCOSE BLD STRIP.AUTO-MCNC: 146 MG/DL (ref 65–99)
GLUCOSE BLD STRIP.AUTO-MCNC: 149 MG/DL (ref 65–99)
GLUCOSE BLD STRIP.AUTO-MCNC: 166 MG/DL (ref 65–99)
GLUCOSE BLD STRIP.AUTO-MCNC: 239 MG/DL (ref 65–99)
GLUCOSE BLD STRIP.AUTO-MCNC: 290 MG/DL (ref 65–99)
GLUCOSE BLD STRIP.AUTO-MCNC: 302 MG/DL (ref 65–99)
GLUCOSE BLD STRIP.AUTO-MCNC: 359 MG/DL (ref 65–99)
GLUCOSE BLD STRIP.AUTO-MCNC: 383 MG/DL (ref 65–99)
GLUCOSE BLD STRIP.AUTO-MCNC: 401 MG/DL (ref 65–99)
GLUCOSE BLD STRIP.AUTO-MCNC: 475 MG/DL (ref 65–99)
GLUCOSE BLD STRIP.AUTO-MCNC: 509 MG/DL (ref 65–99)
GLUCOSE BLD STRIP.AUTO-MCNC: 522 MG/DL (ref 65–99)
GLUCOSE BLD STRIP.AUTO-MCNC: 571 MG/DL (ref 65–99)
GLUCOSE BLD STRIP.AUTO-MCNC: 64 MG/DL (ref 65–99)
GLUCOSE BLD STRIP.AUTO-MCNC: 71 MG/DL (ref 65–99)
GLUCOSE BLD STRIP.AUTO-MCNC: 84 MG/DL (ref 65–99)
GLUCOSE BLD STRIP.AUTO-MCNC: 93 MG/DL (ref 65–99)
GLUCOSE BLD STRIP.AUTO-MCNC: 96 MG/DL (ref 65–99)
GLUCOSE SERPL-MCNC: 196 MG/DL (ref 65–99)
GLUCOSE SERPL-MCNC: 352 MG/DL (ref 65–99)
GLUCOSE SERPL-MCNC: 476 MG/DL (ref 65–99)
GLUCOSE SERPL-MCNC: 73 MG/DL (ref 65–99)
HCT VFR BLD AUTO: 29.1 % (ref 42–52)
HGB BLD-MCNC: 9.8 G/DL (ref 14–18)
MAGNESIUM SERPL-MCNC: 1.9 MG/DL (ref 1.5–2.5)
MAGNESIUM SERPL-MCNC: 2 MG/DL (ref 1.5–2.5)
MCH RBC QN AUTO: 27.3 PG (ref 27–33)
MCHC RBC AUTO-ENTMCNC: 33.7 G/DL (ref 32.3–36.5)
MCV RBC AUTO: 81.1 FL (ref 81.4–97.8)
PHOSPHATE SERPL-MCNC: 2.9 MG/DL (ref 2.5–4.5)
PHOSPHATE SERPL-MCNC: 4 MG/DL (ref 2.5–4.5)
PLATELET # BLD AUTO: 402 K/UL (ref 164–446)
PMV BLD AUTO: 9.4 FL (ref 9–12.9)
POTASSIUM SERPL-SCNC: 3.6 MMOL/L (ref 3.6–5.5)
POTASSIUM SERPL-SCNC: 3.8 MMOL/L (ref 3.6–5.5)
POTASSIUM SERPL-SCNC: 4 MMOL/L (ref 3.6–5.5)
POTASSIUM SERPL-SCNC: 4.2 MMOL/L (ref 3.6–5.5)
RBC # BLD AUTO: 3.59 M/UL (ref 4.7–6.1)
SODIUM SERPL-SCNC: 130 MMOL/L (ref 135–145)
SODIUM SERPL-SCNC: 131 MMOL/L (ref 135–145)
SODIUM SERPL-SCNC: 134 MMOL/L (ref 135–145)
SODIUM SERPL-SCNC: 135 MMOL/L (ref 135–145)
WBC # BLD AUTO: 12.2 K/UL (ref 4.8–10.8)

## 2025-08-24 PROCEDURE — A9270 NON-COVERED ITEM OR SERVICE: HCPCS | Performed by: STUDENT IN AN ORGANIZED HEALTH CARE EDUCATION/TRAINING PROGRAM

## 2025-08-24 PROCEDURE — 85027 COMPLETE CBC AUTOMATED: CPT

## 2025-08-24 PROCEDURE — 82962 GLUCOSE BLOOD TEST: CPT | Performed by: HOSPITALIST

## 2025-08-24 PROCEDURE — 700105 HCHG RX REV CODE 258

## 2025-08-24 PROCEDURE — 80048 BASIC METABOLIC PNL TOTAL CA: CPT | Mod: 91

## 2025-08-24 PROCEDURE — 97602 WOUND(S) CARE NON-SELECTIVE: CPT

## 2025-08-24 PROCEDURE — 770006 HCHG ROOM/CARE - MED/SURG/GYN SEMI*

## 2025-08-24 PROCEDURE — 700102 HCHG RX REV CODE 250 W/ 637 OVERRIDE(OP)

## 2025-08-24 PROCEDURE — 700102 HCHG RX REV CODE 250 W/ 637 OVERRIDE(OP): Performed by: STUDENT IN AN ORGANIZED HEALTH CARE EDUCATION/TRAINING PROGRAM

## 2025-08-24 PROCEDURE — 84100 ASSAY OF PHOSPHORUS: CPT | Mod: 91

## 2025-08-24 PROCEDURE — 700105 HCHG RX REV CODE 258: Performed by: STUDENT IN AN ORGANIZED HEALTH CARE EDUCATION/TRAINING PROGRAM

## 2025-08-24 PROCEDURE — 82010 KETONE BODYS QUAN: CPT

## 2025-08-24 PROCEDURE — 82962 GLUCOSE BLOOD TEST: CPT | Performed by: STUDENT IN AN ORGANIZED HEALTH CARE EDUCATION/TRAINING PROGRAM

## 2025-08-24 PROCEDURE — 83735 ASSAY OF MAGNESIUM: CPT

## 2025-08-24 PROCEDURE — 700111 HCHG RX REV CODE 636 W/ 250 OVERRIDE (IP): Mod: JZ | Performed by: STUDENT IN AN ORGANIZED HEALTH CARE EDUCATION/TRAINING PROGRAM

## 2025-08-24 PROCEDURE — 700111 HCHG RX REV CODE 636 W/ 250 OVERRIDE (IP): Performed by: STUDENT IN AN ORGANIZED HEALTH CARE EDUCATION/TRAINING PROGRAM

## 2025-08-24 RX ORDER — INSULIN LISPRO 100 [IU]/ML
3-14 INJECTION, SOLUTION INTRAVENOUS; SUBCUTANEOUS
Status: DISPENSED | OUTPATIENT
Start: 2025-08-24

## 2025-08-24 RX ORDER — POTASSIUM CHLORIDE 7.45 MG/ML
10 INJECTION INTRAVENOUS
Status: COMPLETED | OUTPATIENT
Start: 2025-08-24 | End: 2025-08-24

## 2025-08-24 RX ORDER — DEXTROSE MONOHYDRATE 25 G/50ML
25 INJECTION, SOLUTION INTRAVENOUS
Status: DISPENSED | OUTPATIENT
Start: 2025-08-24

## 2025-08-24 RX ADMIN — SODIUM CHLORIDE, SODIUM LACTATE, POTASSIUM CHLORIDE, CALCIUM CHLORIDE AND DEXTROSE MONOHYDRATE: 5; 600; 310; 30; 20 INJECTION, SOLUTION INTRAVENOUS at 09:31

## 2025-08-24 RX ADMIN — SODIUM CHLORIDE, POTASSIUM CHLORIDE, SODIUM LACTATE AND CALCIUM CHLORIDE: 600; 310; 30; 20 INJECTION, SOLUTION INTRAVENOUS at 06:45

## 2025-08-24 RX ADMIN — PIPERACILLIN AND TAZOBACTAM 4.5 G: 4; .5 INJECTION, POWDER, FOR SOLUTION INTRAVENOUS at 00:06

## 2025-08-24 RX ADMIN — NICOTINE TRANSDERMAL SYSTEM 21 MG: 21 PATCH, EXTENDED RELEASE TRANSDERMAL at 05:42

## 2025-08-24 RX ADMIN — INSULIN GLARGINE-YFGN 22 UNITS: 100 INJECTION, SOLUTION SUBCUTANEOUS at 14:48

## 2025-08-24 RX ADMIN — APIXABAN 10 MG: 5 TABLET, FILM COATED ORAL at 05:35

## 2025-08-24 RX ADMIN — SODIUM BICARBONATE 50 MEQ: 84 INJECTION INTRAVENOUS at 00:09

## 2025-08-24 RX ADMIN — POTASSIUM CHLORIDE 10 MEQ: 7.46 INJECTION, SOLUTION INTRAVENOUS at 01:15

## 2025-08-24 RX ADMIN — MAGNESIUM SULFATE HEPTAHYDRATE 2 G: 2 INJECTION, SOLUTION INTRAVENOUS at 09:37

## 2025-08-24 RX ADMIN — POTASSIUM CHLORIDE 10 MEQ: 7.46 INJECTION, SOLUTION INTRAVENOUS at 08:50

## 2025-08-24 RX ADMIN — PIPERACILLIN AND TAZOBACTAM 4.5 G: 4; .5 INJECTION, POWDER, FOR SOLUTION INTRAVENOUS at 07:33

## 2025-08-24 RX ADMIN — APIXABAN 10 MG: 5 TABLET, FILM COATED ORAL at 17:16

## 2025-08-24 RX ADMIN — PIPERACILLIN AND TAZOBACTAM 4.5 G: 4; .5 INJECTION, POWDER, FOR SOLUTION INTRAVENOUS at 15:43

## 2025-08-24 RX ADMIN — POTASSIUM CHLORIDE 10 MEQ: 7.46 INJECTION, SOLUTION INTRAVENOUS at 07:45

## 2025-08-24 RX ADMIN — SODIUM CHLORIDE 22 UNITS/HR: 9 INJECTION, SOLUTION INTRAVENOUS at 14:15

## 2025-08-24 RX ADMIN — SENNOSIDES, DOCUSATE SODIUM 2 TABLET: 50; 8.6 TABLET, FILM COATED ORAL at 17:16

## 2025-08-24 RX ADMIN — SODIUM CHLORIDE 22 UNITS/HR: 9 INJECTION, SOLUTION INTRAVENOUS at 09:12

## 2025-08-24 RX ADMIN — POTASSIUM CHLORIDE 10 MEQ: 7.46 INJECTION, SOLUTION INTRAVENOUS at 04:30

## 2025-08-24 RX ADMIN — POTASSIUM CHLORIDE 10 MEQ: 7.46 INJECTION, SOLUTION INTRAVENOUS at 05:32

## 2025-08-24 RX ADMIN — DEXTROSE AND SODIUM CHLORIDE: 10; .45 INJECTION, SOLUTION INTRAVENOUS at 11:30

## 2025-08-24 RX ADMIN — POTASSIUM CHLORIDE 10 MEQ: 7.46 INJECTION, SOLUTION INTRAVENOUS at 12:38

## 2025-08-24 RX ADMIN — POTASSIUM CHLORIDE 10 MEQ: 7.46 INJECTION, SOLUTION INTRAVENOUS at 00:05

## 2025-08-24 RX ADMIN — SODIUM CHLORIDE 16 UNITS/HR: 9 INJECTION, SOLUTION INTRAVENOUS at 03:33

## 2025-08-24 RX ADMIN — POTASSIUM CHLORIDE 10 MEQ: 7.46 INJECTION, SOLUTION INTRAVENOUS at 11:22

## 2025-08-24 ASSESSMENT — ENCOUNTER SYMPTOMS
WEAKNESS: 0
DIAPHORESIS: 0
CONSTIPATION: 0
HEARTBURN: 0
PHOTOPHOBIA: 0
VOMITING: 0
DIARRHEA: 0
SHORTNESS OF BREATH: 0
CHILLS: 0
DIZZINESS: 0
NERVOUS/ANXIOUS: 0
HEADACHES: 0
ABDOMINAL PAIN: 0
SORE THROAT: 0
NAUSEA: 0
BLURRED VISION: 0
INSOMNIA: 0
PALPITATIONS: 0
WHEEZING: 0
COUGH: 0
FEVER: 0
EYE PAIN: 0
DEPRESSION: 0

## 2025-08-24 ASSESSMENT — PAIN DESCRIPTION - PAIN TYPE
TYPE: ACUTE PAIN

## 2025-08-25 ENCOUNTER — APPOINTMENT (OUTPATIENT)
Dept: RADIOLOGY | Facility: MEDICAL CENTER | Age: 55
End: 2025-08-25
Attending: STUDENT IN AN ORGANIZED HEALTH CARE EDUCATION/TRAINING PROGRAM
Payer: MEDICAID

## 2025-08-25 LAB
ALBUMIN SERPL BCP-MCNC: 2.3 G/DL (ref 3.2–4.9)
ALBUMIN/GLOB SERPL: 1 G/DL
ALP SERPL-CCNC: 92 U/L (ref 30–99)
ALT SERPL-CCNC: 22 U/L (ref 2–50)
ANION GAP SERPL CALC-SCNC: 9 MMOL/L (ref 7–16)
AST SERPL-CCNC: 18 U/L (ref 12–45)
BACTERIA UR CULT: NORMAL
BILIRUB SERPL-MCNC: <0.2 MG/DL (ref 0.1–1.5)
BUN SERPL-MCNC: 39 MG/DL (ref 8–22)
CALCIUM ALBUM COR SERPL-MCNC: 8.9 MG/DL (ref 8.5–10.5)
CALCIUM SERPL-MCNC: 7.5 MG/DL (ref 8.5–10.5)
CHLORIDE SERPL-SCNC: 103 MMOL/L (ref 96–112)
CO2 SERPL-SCNC: 22 MMOL/L (ref 20–33)
CREAT SERPL-MCNC: 1.58 MG/DL (ref 0.5–1.4)
ERYTHROCYTE [DISTWIDTH] IN BLOOD BY AUTOMATED COUNT: 36.9 FL (ref 35.9–50)
GFR SERPLBLD CREATININE-BSD FMLA CKD-EPI: 51 ML/MIN/1.73 M 2
GLOBULIN SER CALC-MCNC: 2.4 G/DL (ref 1.9–3.5)
GLUCOSE BLD STRIP.AUTO-MCNC: 153 MG/DL (ref 65–99)
GLUCOSE BLD STRIP.AUTO-MCNC: 277 MG/DL (ref 65–99)
GLUCOSE BLD STRIP.AUTO-MCNC: 281 MG/DL (ref 65–99)
GLUCOSE BLD STRIP.AUTO-MCNC: 81 MG/DL (ref 65–99)
GLUCOSE SERPL-MCNC: 116 MG/DL (ref 65–99)
HCT VFR BLD AUTO: 27.2 % (ref 42–52)
HGB BLD-MCNC: 9.4 G/DL (ref 14–18)
MCH RBC QN AUTO: 27.6 PG (ref 27–33)
MCHC RBC AUTO-ENTMCNC: 34.6 G/DL (ref 32.3–36.5)
MCV RBC AUTO: 79.8 FL (ref 81.4–97.8)
PLATELET # BLD AUTO: 356 K/UL (ref 164–446)
PMV BLD AUTO: 9.4 FL (ref 9–12.9)
POTASSIUM SERPL-SCNC: 3.7 MMOL/L (ref 3.6–5.5)
PROT SERPL-MCNC: 4.7 G/DL (ref 6–8.2)
RBC # BLD AUTO: 3.41 M/UL (ref 4.7–6.1)
SIGNIFICANT IND 70042: NORMAL
SITE SITE: NORMAL
SODIUM SERPL-SCNC: 134 MMOL/L (ref 135–145)
SOURCE SOURCE: NORMAL
WBC # BLD AUTO: 9.2 K/UL (ref 4.8–10.8)

## 2025-08-25 PROCEDURE — 700102 HCHG RX REV CODE 250 W/ 637 OVERRIDE(OP): Performed by: STUDENT IN AN ORGANIZED HEALTH CARE EDUCATION/TRAINING PROGRAM

## 2025-08-25 PROCEDURE — A9579 GAD-BASE MR CONTRAST NOS,1ML: HCPCS | Mod: JZ | Performed by: STUDENT IN AN ORGANIZED HEALTH CARE EDUCATION/TRAINING PROGRAM

## 2025-08-25 PROCEDURE — 700111 HCHG RX REV CODE 636 W/ 250 OVERRIDE (IP): Mod: JZ | Performed by: STUDENT IN AN ORGANIZED HEALTH CARE EDUCATION/TRAINING PROGRAM

## 2025-08-25 PROCEDURE — 73720 MRI LWR EXTREMITY W/O&W/DYE: CPT | Mod: RT

## 2025-08-25 PROCEDURE — A9270 NON-COVERED ITEM OR SERVICE: HCPCS | Performed by: STUDENT IN AN ORGANIZED HEALTH CARE EDUCATION/TRAINING PROGRAM

## 2025-08-25 PROCEDURE — 770006 HCHG ROOM/CARE - MED/SURG/GYN SEMI*

## 2025-08-25 PROCEDURE — 80053 COMPREHEN METABOLIC PANEL: CPT

## 2025-08-25 PROCEDURE — 36415 COLL VENOUS BLD VENIPUNCTURE: CPT

## 2025-08-25 PROCEDURE — 700117 HCHG RX CONTRAST REV CODE 255: Mod: JZ | Performed by: STUDENT IN AN ORGANIZED HEALTH CARE EDUCATION/TRAINING PROGRAM

## 2025-08-25 PROCEDURE — 82962 GLUCOSE BLOOD TEST: CPT | Performed by: STUDENT IN AN ORGANIZED HEALTH CARE EDUCATION/TRAINING PROGRAM

## 2025-08-25 PROCEDURE — 85027 COMPLETE CBC AUTOMATED: CPT

## 2025-08-25 PROCEDURE — 700105 HCHG RX REV CODE 258: Performed by: STUDENT IN AN ORGANIZED HEALTH CARE EDUCATION/TRAINING PROGRAM

## 2025-08-25 RX ORDER — DIPHENHYDRAMINE HCL 25 MG
25 TABLET ORAL EVERY 6 HOURS PRN
Status: DISPENSED | OUTPATIENT
Start: 2025-08-25

## 2025-08-25 RX ORDER — GADOTERIDOL 279.3 MG/ML
20 INJECTION INTRAVENOUS ONCE
Status: COMPLETED | OUTPATIENT
Start: 2025-08-25 | End: 2025-08-25

## 2025-08-25 RX ORDER — FUROSEMIDE 10 MG/ML
40 INJECTION INTRAMUSCULAR; INTRAVENOUS
Status: DISCONTINUED | OUTPATIENT
Start: 2025-08-25 | End: 2025-08-25

## 2025-08-25 RX ADMIN — INSULIN LISPRO 7 UNITS: 100 INJECTION, SOLUTION INTRAVENOUS; SUBCUTANEOUS at 17:32

## 2025-08-25 RX ADMIN — PIPERACILLIN AND TAZOBACTAM 4.5 G: 4; .5 INJECTION, POWDER, FOR SOLUTION INTRAVENOUS at 20:35

## 2025-08-25 RX ADMIN — PIPERACILLIN AND TAZOBACTAM 4.5 G: 4; .5 INJECTION, POWDER, FOR SOLUTION INTRAVENOUS at 05:14

## 2025-08-25 RX ADMIN — ACETAMINOPHEN 1000 MG: 500 TABLET ORAL at 16:01

## 2025-08-25 RX ADMIN — DIPHENHYDRAMINE HYDROCHLORIDE 25 MG: 25 TABLET ORAL at 17:36

## 2025-08-25 RX ADMIN — GADOTERIDOL 20 ML: 279.3 INJECTION, SOLUTION INTRAVENOUS at 14:35

## 2025-08-25 RX ADMIN — APIXABAN 10 MG: 5 TABLET, FILM COATED ORAL at 17:35

## 2025-08-25 RX ADMIN — NICOTINE TRANSDERMAL SYSTEM 21 MG: 21 PATCH, EXTENDED RELEASE TRANSDERMAL at 05:16

## 2025-08-25 RX ADMIN — INSULIN GLARGINE-YFGN 22 UNITS: 100 INJECTION, SOLUTION SUBCUTANEOUS at 17:32

## 2025-08-25 RX ADMIN — INSULIN LISPRO 7 UNITS: 100 INJECTION, SOLUTION INTRAVENOUS; SUBCUTANEOUS at 20:39

## 2025-08-25 RX ADMIN — PIPERACILLIN AND TAZOBACTAM 4.5 G: 4; .5 INJECTION, POWDER, FOR SOLUTION INTRAVENOUS at 12:21

## 2025-08-25 RX ADMIN — INSULIN LISPRO 3 UNITS: 100 INJECTION, SOLUTION INTRAVENOUS; SUBCUTANEOUS at 12:15

## 2025-08-25 RX ADMIN — APIXABAN 10 MG: 5 TABLET, FILM COATED ORAL at 05:16

## 2025-08-25 ASSESSMENT — PAIN DESCRIPTION - PAIN TYPE
TYPE: ACUTE PAIN

## 2025-08-26 ENCOUNTER — APPOINTMENT (OUTPATIENT)
Dept: RADIOLOGY | Facility: MEDICAL CENTER | Age: 55
End: 2025-08-26
Attending: STUDENT IN AN ORGANIZED HEALTH CARE EDUCATION/TRAINING PROGRAM
Payer: MEDICAID

## 2025-08-26 LAB
ALBUMIN SERPL BCP-MCNC: 2.5 G/DL (ref 3.2–4.9)
ALBUMIN/GLOB SERPL: 1 G/DL
ALP SERPL-CCNC: 99 U/L (ref 30–99)
ALT SERPL-CCNC: 26 U/L (ref 2–50)
ANION GAP SERPL CALC-SCNC: 6 MMOL/L (ref 7–16)
AST SERPL-CCNC: 38 U/L (ref 12–45)
BILIRUB SERPL-MCNC: 0.3 MG/DL (ref 0.1–1.5)
BUN SERPL-MCNC: 32 MG/DL (ref 8–22)
CALCIUM ALBUM COR SERPL-MCNC: 9 MG/DL (ref 8.5–10.5)
CALCIUM SERPL-MCNC: 7.8 MG/DL (ref 8.5–10.5)
CHLORIDE SERPL-SCNC: 103 MMOL/L (ref 96–112)
CO2 SERPL-SCNC: 23 MMOL/L (ref 20–33)
CREAT SERPL-MCNC: 1.48 MG/DL (ref 0.5–1.4)
ERYTHROCYTE [DISTWIDTH] IN BLOOD BY AUTOMATED COUNT: 40.8 FL (ref 35.9–50)
GFR SERPLBLD CREATININE-BSD FMLA CKD-EPI: 55 ML/MIN/1.73 M 2
GLOBULIN SER CALC-MCNC: 2.5 G/DL (ref 1.9–3.5)
GLUCOSE BLD STRIP.AUTO-MCNC: 142 MG/DL (ref 65–99)
GLUCOSE BLD STRIP.AUTO-MCNC: 179 MG/DL (ref 65–99)
GLUCOSE BLD STRIP.AUTO-MCNC: 227 MG/DL (ref 65–99)
GLUCOSE BLD STRIP.AUTO-MCNC: 280 MG/DL (ref 65–99)
GLUCOSE SERPL-MCNC: 149 MG/DL (ref 65–99)
HCT VFR BLD AUTO: 26.5 % (ref 42–52)
HGB BLD-MCNC: 8.5 G/DL (ref 14–18)
MAGNESIUM SERPL-MCNC: 2.1 MG/DL (ref 1.5–2.5)
MCH RBC QN AUTO: 26.8 PG (ref 27–33)
MCHC RBC AUTO-ENTMCNC: 32.1 G/DL (ref 32.3–36.5)
MCV RBC AUTO: 83.6 FL (ref 81.4–97.8)
PHOSPHATE SERPL-MCNC: 2.7 MG/DL (ref 2.5–4.5)
PLATELET # BLD AUTO: 251 K/UL (ref 164–446)
PMV BLD AUTO: 9.8 FL (ref 9–12.9)
POTASSIUM SERPL-SCNC: 4.2 MMOL/L (ref 3.6–5.5)
PROT SERPL-MCNC: 5 G/DL (ref 6–8.2)
RBC # BLD AUTO: 3.17 M/UL (ref 4.7–6.1)
SODIUM SERPL-SCNC: 132 MMOL/L (ref 135–145)
WBC # BLD AUTO: 4.2 K/UL (ref 4.8–10.8)

## 2025-08-26 PROCEDURE — A9270 NON-COVERED ITEM OR SERVICE: HCPCS | Performed by: STUDENT IN AN ORGANIZED HEALTH CARE EDUCATION/TRAINING PROGRAM

## 2025-08-26 PROCEDURE — 700102 HCHG RX REV CODE 250 W/ 637 OVERRIDE(OP): Performed by: STUDENT IN AN ORGANIZED HEALTH CARE EDUCATION/TRAINING PROGRAM

## 2025-08-26 PROCEDURE — 93970 EXTREMITY STUDY: CPT

## 2025-08-26 PROCEDURE — 83735 ASSAY OF MAGNESIUM: CPT

## 2025-08-26 PROCEDURE — 700111 HCHG RX REV CODE 636 W/ 250 OVERRIDE (IP): Performed by: STUDENT IN AN ORGANIZED HEALTH CARE EDUCATION/TRAINING PROGRAM

## 2025-08-26 PROCEDURE — 84100 ASSAY OF PHOSPHORUS: CPT

## 2025-08-26 PROCEDURE — 700105 HCHG RX REV CODE 258: Performed by: STUDENT IN AN ORGANIZED HEALTH CARE EDUCATION/TRAINING PROGRAM

## 2025-08-26 PROCEDURE — 82962 GLUCOSE BLOOD TEST: CPT | Performed by: STUDENT IN AN ORGANIZED HEALTH CARE EDUCATION/TRAINING PROGRAM

## 2025-08-26 PROCEDURE — 36415 COLL VENOUS BLD VENIPUNCTURE: CPT

## 2025-08-26 PROCEDURE — 85027 COMPLETE CBC AUTOMATED: CPT

## 2025-08-26 PROCEDURE — 700111 HCHG RX REV CODE 636 W/ 250 OVERRIDE (IP): Mod: JZ | Performed by: STUDENT IN AN ORGANIZED HEALTH CARE EDUCATION/TRAINING PROGRAM

## 2025-08-26 PROCEDURE — 770006 HCHG ROOM/CARE - MED/SURG/GYN SEMI*

## 2025-08-26 PROCEDURE — 80053 COMPREHEN METABOLIC PANEL: CPT

## 2025-08-26 RX ORDER — DIAZEPAM 10 MG/2ML
5 INJECTION, SOLUTION INTRAMUSCULAR; INTRAVENOUS ONCE
Status: COMPLETED | OUTPATIENT
Start: 2025-08-26 | End: 2025-08-26

## 2025-08-26 RX ADMIN — APIXABAN 10 MG: 5 TABLET, FILM COATED ORAL at 04:49

## 2025-08-26 RX ADMIN — PSYLLIUM HUSK 1 PACKET: 3.4 POWDER ORAL at 18:34

## 2025-08-26 RX ADMIN — INSULIN GLARGINE-YFGN 22 UNITS: 100 INJECTION, SOLUTION SUBCUTANEOUS at 17:39

## 2025-08-26 RX ADMIN — NICOTINE TRANSDERMAL SYSTEM 21 MG: 21 PATCH, EXTENDED RELEASE TRANSDERMAL at 04:49

## 2025-08-26 RX ADMIN — INSULIN LISPRO 7 UNITS: 100 INJECTION, SOLUTION INTRAVENOUS; SUBCUTANEOUS at 12:48

## 2025-08-26 RX ADMIN — INSULIN LISPRO 3 UNITS: 100 INJECTION, SOLUTION INTRAVENOUS; SUBCUTANEOUS at 20:16

## 2025-08-26 RX ADMIN — PIPERACILLIN AND TAZOBACTAM 4.5 G: 4; .5 INJECTION, POWDER, FOR SOLUTION INTRAVENOUS at 12:40

## 2025-08-26 RX ADMIN — DIAZEPAM 5 MG: 5 INJECTION INTRAMUSCULAR; INTRAVENOUS at 12:37

## 2025-08-26 RX ADMIN — DIPHENHYDRAMINE HYDROCHLORIDE 25 MG: 25 TABLET ORAL at 10:43

## 2025-08-26 RX ADMIN — PIPERACILLIN AND TAZOBACTAM 4.5 G: 4; .5 INJECTION, POWDER, FOR SOLUTION INTRAVENOUS at 04:52

## 2025-08-26 RX ADMIN — AMPICILLIN SODIUM, SULBACTAM SODIUM 3 G: 2; 1 INJECTION, POWDER, FOR SOLUTION INTRAMUSCULAR; INTRAVENOUS at 17:58

## 2025-08-26 RX ADMIN — AMPICILLIN SODIUM, SULBACTAM SODIUM 3 G: 2; 1 INJECTION, POWDER, FOR SOLUTION INTRAMUSCULAR; INTRAVENOUS at 23:06

## 2025-08-26 RX ADMIN — INSULIN LISPRO 4 UNITS: 100 INJECTION, SOLUTION INTRAVENOUS; SUBCUTANEOUS at 17:39

## 2025-08-26 ASSESSMENT — COGNITIVE AND FUNCTIONAL STATUS - GENERAL
SUGGESTED CMS G CODE MODIFIER DAILY ACTIVITY: CH
MOVING TO AND FROM BED TO CHAIR: A LITTLE
SUGGESTED CMS G CODE MODIFIER MOBILITY: CJ
STANDING UP FROM CHAIR USING ARMS: A LITTLE
CLIMB 3 TO 5 STEPS WITH RAILING: A LITTLE
MOVING FROM LYING ON BACK TO SITTING ON SIDE OF FLAT BED: A LITTLE
DAILY ACTIVITIY SCORE: 24
MOBILITY SCORE: 20

## 2025-08-26 ASSESSMENT — PAIN DESCRIPTION - PAIN TYPE: TYPE: ACUTE PAIN

## 2025-08-27 ENCOUNTER — SURGERY (OUTPATIENT)
Age: 55
End: 2025-08-27
Payer: MEDICAID

## 2025-08-27 ENCOUNTER — ANESTHESIA EVENT (OUTPATIENT)
Dept: SURGERY | Facility: MEDICAL CENTER | Age: 55
End: 2025-08-27
Payer: MEDICAID

## 2025-08-27 ENCOUNTER — ANESTHESIA (OUTPATIENT)
Dept: SURGERY | Facility: MEDICAL CENTER | Age: 55
End: 2025-08-27
Payer: MEDICAID

## 2025-08-27 LAB
ALBUMIN SERPL BCP-MCNC: 2.8 G/DL (ref 3.2–4.9)
ALBUMIN/GLOB SERPL: 1 G/DL
ALP SERPL-CCNC: 109 U/L (ref 30–99)
ALT SERPL-CCNC: 31 U/L (ref 2–50)
ANION GAP SERPL CALC-SCNC: 9 MMOL/L (ref 7–16)
AST SERPL-CCNC: 44 U/L (ref 12–45)
BILIRUB SERPL-MCNC: 0.2 MG/DL (ref 0.1–1.5)
BUN SERPL-MCNC: 36 MG/DL (ref 8–22)
CALCIUM ALBUM COR SERPL-MCNC: 9.4 MG/DL (ref 8.5–10.5)
CALCIUM SERPL-MCNC: 8.4 MG/DL (ref 8.5–10.5)
CHLORIDE SERPL-SCNC: 105 MMOL/L (ref 96–112)
CO2 SERPL-SCNC: 23 MMOL/L (ref 20–33)
CREAT SERPL-MCNC: 1.25 MG/DL (ref 0.5–1.4)
ERYTHROCYTE [DISTWIDTH] IN BLOOD BY AUTOMATED COUNT: 40.7 FL (ref 35.9–50)
GFR SERPLBLD CREATININE-BSD FMLA CKD-EPI: 68 ML/MIN/1.73 M 2
GLOBULIN SER CALC-MCNC: 2.7 G/DL (ref 1.9–3.5)
GLUCOSE BLD STRIP.AUTO-MCNC: 115 MG/DL (ref 65–99)
GLUCOSE BLD STRIP.AUTO-MCNC: 264 MG/DL (ref 65–99)
GLUCOSE BLD STRIP.AUTO-MCNC: 316 MG/DL (ref 65–99)
GLUCOSE BLD STRIP.AUTO-MCNC: 342 MG/DL (ref 65–99)
GLUCOSE SERPL-MCNC: 63 MG/DL (ref 65–99)
HCT VFR BLD AUTO: 30.5 % (ref 42–52)
HGB BLD-MCNC: 10.1 G/DL (ref 14–18)
MAGNESIUM SERPL-MCNC: 2.1 MG/DL (ref 1.5–2.5)
MCH RBC QN AUTO: 27.7 PG (ref 27–33)
MCHC RBC AUTO-ENTMCNC: 33.1 G/DL (ref 32.3–36.5)
MCV RBC AUTO: 83.8 FL (ref 81.4–97.8)
PHOSPHATE SERPL-MCNC: 2.7 MG/DL (ref 2.5–4.5)
PLATELET # BLD AUTO: 302 K/UL (ref 164–446)
PMV BLD AUTO: 9.9 FL (ref 9–12.9)
POTASSIUM SERPL-SCNC: 4.1 MMOL/L (ref 3.6–5.5)
PROT SERPL-MCNC: 5.5 G/DL (ref 6–8.2)
RBC # BLD AUTO: 3.64 M/UL (ref 4.7–6.1)
SODIUM SERPL-SCNC: 137 MMOL/L (ref 135–145)
WBC # BLD AUTO: 6.5 K/UL (ref 4.8–10.8)

## 2025-08-27 PROCEDURE — 64447 NJX AA&/STRD FEMORAL NRV IMG: CPT | Performed by: ORTHOPAEDIC SURGERY

## 2025-08-27 PROCEDURE — 770006 HCHG ROOM/CARE - MED/SURG/GYN SEMI*

## 2025-08-27 PROCEDURE — 84100 ASSAY OF PHOSPHORUS: CPT

## 2025-08-27 PROCEDURE — 700101 HCHG RX REV CODE 250: Performed by: ANESTHESIOLOGY

## 2025-08-27 PROCEDURE — 160028 HCHG SURGERY MINUTES - 1ST 30 MINS LEVEL 3: Performed by: ORTHOPAEDIC SURGERY

## 2025-08-27 PROCEDURE — 97162 PT EVAL MOD COMPLEX 30 MIN: CPT

## 2025-08-27 PROCEDURE — 85027 COMPLETE CBC AUTOMATED: CPT

## 2025-08-27 PROCEDURE — A9270 NON-COVERED ITEM OR SERVICE: HCPCS | Performed by: STUDENT IN AN ORGANIZED HEALTH CARE EDUCATION/TRAINING PROGRAM

## 2025-08-27 PROCEDURE — 83735 ASSAY OF MAGNESIUM: CPT

## 2025-08-27 PROCEDURE — 82962 GLUCOSE BLOOD TEST: CPT | Performed by: STUDENT IN AN ORGANIZED HEALTH CARE EDUCATION/TRAINING PROGRAM

## 2025-08-27 PROCEDURE — 160193 HCHG PACU STANDARD - 1ST 60 MINS: Performed by: ORTHOPAEDIC SURGERY

## 2025-08-27 PROCEDURE — 88307 TISSUE EXAM BY PATHOLOGIST: CPT | Performed by: PATHOLOGY

## 2025-08-27 PROCEDURE — 700102 HCHG RX REV CODE 250 W/ 637 OVERRIDE(OP): Performed by: STUDENT IN AN ORGANIZED HEALTH CARE EDUCATION/TRAINING PROGRAM

## 2025-08-27 PROCEDURE — 160015 HCHG STAT PREOP MINUTES: Performed by: ORTHOPAEDIC SURGERY

## 2025-08-27 PROCEDURE — 700101 HCHG RX REV CODE 250: Performed by: ORTHOPAEDIC SURGERY

## 2025-08-27 PROCEDURE — 160002 HCHG RECOVERY MINUTES (STAT): Performed by: ORTHOPAEDIC SURGERY

## 2025-08-27 PROCEDURE — 700111 HCHG RX REV CODE 636 W/ 250 OVERRIDE (IP): Performed by: ANESTHESIOLOGY

## 2025-08-27 PROCEDURE — 160048 HCHG OR STATISTICAL LEVEL 1-5: Performed by: ORTHOPAEDIC SURGERY

## 2025-08-27 PROCEDURE — 700105 HCHG RX REV CODE 258: Performed by: STUDENT IN AN ORGANIZED HEALTH CARE EDUCATION/TRAINING PROGRAM

## 2025-08-27 PROCEDURE — 700105 HCHG RX REV CODE 258: Performed by: ANESTHESIOLOGY

## 2025-08-27 PROCEDURE — 64445 NJX AA&/STRD SCIATIC NRV IMG: CPT | Performed by: ORTHOPAEDIC SURGERY

## 2025-08-27 PROCEDURE — 80053 COMPREHEN METABOLIC PANEL: CPT

## 2025-08-27 PROCEDURE — 160039 HCHG SURGERY MINUTES - EA ADDL 1 MIN LEVEL 3: Performed by: ORTHOPAEDIC SURGERY

## 2025-08-27 PROCEDURE — 700111 HCHG RX REV CODE 636 W/ 250 OVERRIDE (IP): Mod: JZ | Performed by: STUDENT IN AN ORGANIZED HEALTH CARE EDUCATION/TRAINING PROGRAM

## 2025-08-27 PROCEDURE — 160192 HCHG ANESTHESIA COMPLEX: Performed by: ORTHOPAEDIC SURGERY

## 2025-08-27 PROCEDURE — 36415 COLL VENOUS BLD VENIPUNCTURE: CPT

## 2025-08-27 PROCEDURE — 88311 DECALCIFY TISSUE: CPT | Performed by: PATHOLOGY

## 2025-08-27 RX ORDER — ONDANSETRON 2 MG/ML
4 INJECTION INTRAMUSCULAR; INTRAVENOUS
Status: DISCONTINUED | OUTPATIENT
Start: 2025-08-27 | End: 2025-08-27 | Stop reason: HOSPADM

## 2025-08-27 RX ORDER — DIAZEPAM 10 MG/2ML
5 INJECTION, SOLUTION INTRAMUSCULAR; INTRAVENOUS EVERY 6 HOURS PRN
Status: DISPENSED | OUTPATIENT
Start: 2025-08-27

## 2025-08-27 RX ORDER — MAGNESIUM HYDROXIDE 1200 MG/15ML
LIQUID ORAL
Status: COMPLETED | OUTPATIENT
Start: 2025-08-27 | End: 2025-08-27

## 2025-08-27 RX ORDER — BUPIVACAINE HYDROCHLORIDE AND EPINEPHRINE 2.5; 5 MG/ML; UG/ML
INJECTION, SOLUTION EPIDURAL; INFILTRATION; INTRACAUDAL; PERINEURAL
Status: COMPLETED | OUTPATIENT
Start: 2025-08-27 | End: 2025-08-27

## 2025-08-27 RX ORDER — DEXTROSE MONOHYDRATE 25 G/50ML
12.5 INJECTION, SOLUTION INTRAVENOUS
Status: DISCONTINUED | OUTPATIENT
Start: 2025-08-27 | End: 2025-08-27 | Stop reason: HOSPADM

## 2025-08-27 RX ORDER — HYDRALAZINE HYDROCHLORIDE 20 MG/ML
5 INJECTION INTRAMUSCULAR; INTRAVENOUS
Status: DISCONTINUED | OUTPATIENT
Start: 2025-08-27 | End: 2025-08-27 | Stop reason: HOSPADM

## 2025-08-27 RX ORDER — HYDROMORPHONE HYDROCHLORIDE 2 MG/ML
INJECTION, SOLUTION INTRAMUSCULAR; INTRAVENOUS; SUBCUTANEOUS PRN
Status: DISCONTINUED | OUTPATIENT
Start: 2025-08-27 | End: 2025-08-27 | Stop reason: SURG

## 2025-08-27 RX ORDER — HYDROMORPHONE HYDROCHLORIDE 1 MG/ML
0.1 INJECTION, SOLUTION INTRAMUSCULAR; INTRAVENOUS; SUBCUTANEOUS
Status: DISCONTINUED | OUTPATIENT
Start: 2025-08-27 | End: 2025-08-27 | Stop reason: HOSPADM

## 2025-08-27 RX ORDER — OXYCODONE HCL 5 MG/5 ML
10 SOLUTION, ORAL ORAL
Status: DISCONTINUED | OUTPATIENT
Start: 2025-08-27 | End: 2025-08-27 | Stop reason: HOSPADM

## 2025-08-27 RX ORDER — INSULIN LISPRO 100 [IU]/ML
2-9 INJECTION, SOLUTION INTRAVENOUS; SUBCUTANEOUS EVERY 6 HOURS
Status: DISCONTINUED | OUTPATIENT
Start: 2025-08-27 | End: 2025-08-27 | Stop reason: HOSPADM

## 2025-08-27 RX ORDER — ONDANSETRON 2 MG/ML
INJECTION INTRAMUSCULAR; INTRAVENOUS PRN
Status: DISCONTINUED | OUTPATIENT
Start: 2025-08-27 | End: 2025-08-27 | Stop reason: SURG

## 2025-08-27 RX ORDER — SODIUM CHLORIDE, SODIUM LACTATE, POTASSIUM CHLORIDE, CALCIUM CHLORIDE 600; 310; 30; 20 MG/100ML; MG/100ML; MG/100ML; MG/100ML
INJECTION, SOLUTION INTRAVENOUS CONTINUOUS
Status: DISCONTINUED | OUTPATIENT
Start: 2025-08-27 | End: 2025-08-27 | Stop reason: HOSPADM

## 2025-08-27 RX ORDER — HYDROMORPHONE HYDROCHLORIDE 1 MG/ML
0.4 INJECTION, SOLUTION INTRAMUSCULAR; INTRAVENOUS; SUBCUTANEOUS
Status: DISCONTINUED | OUTPATIENT
Start: 2025-08-27 | End: 2025-08-27 | Stop reason: HOSPADM

## 2025-08-27 RX ORDER — DIPHENHYDRAMINE HYDROCHLORIDE 50 MG/ML
12.5 INJECTION, SOLUTION INTRAMUSCULAR; INTRAVENOUS
Status: DISCONTINUED | OUTPATIENT
Start: 2025-08-27 | End: 2025-08-27 | Stop reason: HOSPADM

## 2025-08-27 RX ORDER — METOPROLOL TARTRATE 1 MG/ML
1 INJECTION, SOLUTION INTRAVENOUS
Status: DISCONTINUED | OUTPATIENT
Start: 2025-08-27 | End: 2025-08-27 | Stop reason: HOSPADM

## 2025-08-27 RX ORDER — VASOPRESSIN 20 [USP'U]/ML
INJECTION, SOLUTION INTRAVENOUS PRN
Status: DISCONTINUED | OUTPATIENT
Start: 2025-08-27 | End: 2025-08-27 | Stop reason: SURG

## 2025-08-27 RX ORDER — SODIUM CHLORIDE, SODIUM LACTATE, POTASSIUM CHLORIDE, CALCIUM CHLORIDE 600; 310; 30; 20 MG/100ML; MG/100ML; MG/100ML; MG/100ML
INJECTION, SOLUTION INTRAVENOUS
Status: DISCONTINUED | OUTPATIENT
Start: 2025-08-27 | End: 2025-08-27 | Stop reason: SURG

## 2025-08-27 RX ORDER — MIDAZOLAM HYDROCHLORIDE 1 MG/ML
INJECTION INTRAMUSCULAR; INTRAVENOUS PRN
Status: DISCONTINUED | OUTPATIENT
Start: 2025-08-27 | End: 2025-08-27 | Stop reason: SURG

## 2025-08-27 RX ORDER — MEPERIDINE HYDROCHLORIDE 50 MG/ML
12.5 INJECTION INTRAMUSCULAR; INTRAVENOUS; SUBCUTANEOUS
Status: DISCONTINUED | OUTPATIENT
Start: 2025-08-27 | End: 2025-08-27 | Stop reason: HOSPADM

## 2025-08-27 RX ORDER — OXYCODONE HCL 5 MG/5 ML
5 SOLUTION, ORAL ORAL
Status: DISCONTINUED | OUTPATIENT
Start: 2025-08-27 | End: 2025-08-27 | Stop reason: HOSPADM

## 2025-08-27 RX ORDER — IPRATROPIUM BROMIDE AND ALBUTEROL SULFATE 2.5; .5 MG/3ML; MG/3ML
3 SOLUTION RESPIRATORY (INHALATION)
Status: DISCONTINUED | OUTPATIENT
Start: 2025-08-27 | End: 2025-08-27 | Stop reason: HOSPADM

## 2025-08-27 RX ORDER — HYDROMORPHONE HYDROCHLORIDE 1 MG/ML
1 INJECTION, SOLUTION INTRAMUSCULAR; INTRAVENOUS; SUBCUTANEOUS EVERY 4 HOURS PRN
Status: DISCONTINUED | OUTPATIENT
Start: 2025-08-27 | End: 2025-08-28

## 2025-08-27 RX ORDER — HALOPERIDOL 5 MG/ML
1 INJECTION INTRAMUSCULAR
Status: DISCONTINUED | OUTPATIENT
Start: 2025-08-27 | End: 2025-08-27 | Stop reason: HOSPADM

## 2025-08-27 RX ORDER — HYDROMORPHONE HYDROCHLORIDE 1 MG/ML
0.2 INJECTION, SOLUTION INTRAMUSCULAR; INTRAVENOUS; SUBCUTANEOUS
Status: DISCONTINUED | OUTPATIENT
Start: 2025-08-27 | End: 2025-08-27 | Stop reason: HOSPADM

## 2025-08-27 RX ORDER — LIDOCAINE HYDROCHLORIDE 20 MG/ML
INJECTION, SOLUTION EPIDURAL; INFILTRATION; INTRACAUDAL; PERINEURAL PRN
Status: DISCONTINUED | OUTPATIENT
Start: 2025-08-27 | End: 2025-08-27 | Stop reason: SURG

## 2025-08-27 RX ADMIN — SODIUM CHLORIDE, POTASSIUM CHLORIDE, SODIUM LACTATE AND CALCIUM CHLORIDE: 600; 310; 30; 20 INJECTION, SOLUTION INTRAVENOUS at 12:15

## 2025-08-27 RX ADMIN — HYDROMORPHONE HYDROCHLORIDE 0.5 MG: 2 INJECTION INTRAMUSCULAR; INTRAVENOUS; SUBCUTANEOUS at 09:58

## 2025-08-27 RX ADMIN — ACETAMINOPHEN 1000 MG: 500 TABLET ORAL at 06:26

## 2025-08-27 RX ADMIN — LIDOCAINE HYDROCHLORIDE 40 MG: 20 INJECTION, SOLUTION EPIDURAL; INFILTRATION; INTRACAUDAL; PERINEURAL at 09:54

## 2025-08-27 RX ADMIN — SODIUM CHLORIDE 1000 ML: 900 IRRIGANT IRRIGATION at 10:08

## 2025-08-27 RX ADMIN — AMPICILLIN SODIUM, SULBACTAM SODIUM 3 G: 2; 1 INJECTION, POWDER, FOR SOLUTION INTRAMUSCULAR; INTRAVENOUS at 04:34

## 2025-08-27 RX ADMIN — INSULIN LISPRO 5 UNITS: 100 INJECTION, SOLUTION INTRAVENOUS; SUBCUTANEOUS at 11:44

## 2025-08-27 RX ADMIN — AMPICILLIN SODIUM, SULBACTAM SODIUM 3 G: 2; 1 INJECTION, POWDER, FOR SOLUTION INTRAMUSCULAR; INTRAVENOUS at 23:26

## 2025-08-27 RX ADMIN — INSULIN LISPRO 10 UNITS: 100 INJECTION, SOLUTION INTRAVENOUS; SUBCUTANEOUS at 17:17

## 2025-08-27 RX ADMIN — BUPIVACAINE HYDROCHLORIDE AND EPINEPHRINE 30 ML: 2.5; 5 INJECTION, SOLUTION EPIDURAL; INFILTRATION; INTRACAUDAL; PERINEURAL at 09:31

## 2025-08-27 RX ADMIN — BUPIVACAINE HYDROCHLORIDE AND EPINEPHRINE BITARTRATE 30 ML: 2.5; .0091 INJECTION, SOLUTION EPIDURAL; INFILTRATION; INTRACAUDAL; PERINEURAL at 09:36

## 2025-08-27 RX ADMIN — DIAZEPAM 5 MG: 10 INJECTION, SOLUTION INTRAMUSCULAR; INTRAVENOUS at 17:09

## 2025-08-27 RX ADMIN — VASOPRESSIN 2 UNITS: 20 INJECTION INTRAVENOUS at 10:20

## 2025-08-27 RX ADMIN — INSULIN LISPRO 10 UNITS: 100 INJECTION, SOLUTION INTRAVENOUS; SUBCUTANEOUS at 22:47

## 2025-08-27 RX ADMIN — SODIUM CHLORIDE, POTASSIUM CHLORIDE, SODIUM LACTATE AND CALCIUM CHLORIDE: 600; 310; 30; 20 INJECTION, SOLUTION INTRAVENOUS at 09:19

## 2025-08-27 RX ADMIN — NICOTINE TRANSDERMAL SYSTEM 21 MG: 21 PATCH, EXTENDED RELEASE TRANSDERMAL at 04:34

## 2025-08-27 RX ADMIN — ONDANSETRON 4 MG: 2 INJECTION INTRAMUSCULAR; INTRAVENOUS at 10:19

## 2025-08-27 RX ADMIN — PROPOFOL 150 MG: 10 INJECTION, EMULSION INTRAVENOUS at 09:54

## 2025-08-27 RX ADMIN — MIDAZOLAM HYDROCHLORIDE 2 MG: 1 INJECTION, SOLUTION INTRAMUSCULAR; INTRAVENOUS at 09:27

## 2025-08-27 RX ADMIN — HYDROMORPHONE HYDROCHLORIDE 1 MG: 1 INJECTION, SOLUTION INTRAMUSCULAR; INTRAVENOUS; SUBCUTANEOUS at 18:31

## 2025-08-27 RX ADMIN — SODIUM CHLORIDE, POTASSIUM CHLORIDE, SODIUM LACTATE AND CALCIUM CHLORIDE: 600; 310; 30; 20 INJECTION, SOLUTION INTRAVENOUS at 12:16

## 2025-08-27 RX ADMIN — HYDROMORPHONE HYDROCHLORIDE 1 MG: 1 INJECTION, SOLUTION INTRAMUSCULAR; INTRAVENOUS; SUBCUTANEOUS at 22:55

## 2025-08-27 RX ADMIN — AMPICILLIN SODIUM, SULBACTAM SODIUM 3 G: 2; 1 INJECTION, POWDER, FOR SOLUTION INTRAMUSCULAR; INTRAVENOUS at 17:14

## 2025-08-27 RX ADMIN — INSULIN GLARGINE-YFGN 22 UNITS: 100 INJECTION, SOLUTION SUBCUTANEOUS at 17:18

## 2025-08-27 RX ADMIN — HYDROMORPHONE HYDROCHLORIDE 1 MG: 2 INJECTION INTRAMUSCULAR; INTRAVENOUS; SUBCUTANEOUS at 09:27

## 2025-08-27 ASSESSMENT — COGNITIVE AND FUNCTIONAL STATUS - GENERAL
WALKING IN HOSPITAL ROOM: A LOT
MOVING TO AND FROM BED TO CHAIR: A LOT
SUGGESTED CMS G CODE MODIFIER MOBILITY: CK
STANDING UP FROM CHAIR USING ARMS: A LOT
MOBILITY SCORE: 16
CLIMB 3 TO 5 STEPS WITH RAILING: A LOT

## 2025-08-27 ASSESSMENT — GAIT ASSESSMENTS: GAIT LEVEL OF ASSIST: UNABLE TO PARTICIPATE

## 2025-08-27 ASSESSMENT — PAIN DESCRIPTION - PAIN TYPE
TYPE: ACUTE PAIN

## 2025-08-27 ASSESSMENT — PAIN SCALES - GENERAL: PAIN_LEVEL: 0

## 2025-08-28 LAB
ALBUMIN SERPL BCP-MCNC: 2.6 G/DL (ref 3.2–4.9)
ALBUMIN/GLOB SERPL: 1 G/DL
ALP SERPL-CCNC: 92 U/L (ref 30–99)
ALT SERPL-CCNC: 26 U/L (ref 2–50)
ANION GAP SERPL CALC-SCNC: 6 MMOL/L (ref 7–16)
AST SERPL-CCNC: 28 U/L (ref 12–45)
BACTERIA BLD CULT: NORMAL
BACTERIA BLD CULT: NORMAL
BILIRUB SERPL-MCNC: <0.2 MG/DL (ref 0.1–1.5)
BUN SERPL-MCNC: 35 MG/DL (ref 8–22)
CALCIUM ALBUM COR SERPL-MCNC: 9.2 MG/DL (ref 8.5–10.5)
CALCIUM SERPL-MCNC: 8.1 MG/DL (ref 8.5–10.5)
CHLORIDE SERPL-SCNC: 101 MMOL/L (ref 96–112)
CO2 SERPL-SCNC: 27 MMOL/L (ref 20–33)
CREAT SERPL-MCNC: 1.09 MG/DL (ref 0.5–1.4)
GFR SERPLBLD CREATININE-BSD FMLA CKD-EPI: 80 ML/MIN/1.73 M 2
GLOBULIN SER CALC-MCNC: 2.5 G/DL (ref 1.9–3.5)
GLUCOSE BLD STRIP.AUTO-MCNC: 111 MG/DL (ref 65–99)
GLUCOSE BLD STRIP.AUTO-MCNC: 207 MG/DL (ref 65–99)
GLUCOSE BLD STRIP.AUTO-MCNC: 226 MG/DL (ref 65–99)
GLUCOSE BLD STRIP.AUTO-MCNC: 332 MG/DL (ref 65–99)
GLUCOSE BLD STRIP.AUTO-MCNC: 39 MG/DL (ref 65–99)
GLUCOSE BLD STRIP.AUTO-MCNC: 55 MG/DL (ref 65–99)
GLUCOSE SERPL-MCNC: 61 MG/DL (ref 65–99)
PATHOLOGY CONSULT NOTE: NORMAL
POTASSIUM SERPL-SCNC: 4.1 MMOL/L (ref 3.6–5.5)
PROT SERPL-MCNC: 5.1 G/DL (ref 6–8.2)
SIGNIFICANT IND 70042: NORMAL
SIGNIFICANT IND 70042: NORMAL
SITE SITE: NORMAL
SITE SITE: NORMAL
SODIUM SERPL-SCNC: 134 MMOL/L (ref 135–145)
SOURCE SOURCE: NORMAL
SOURCE SOURCE: NORMAL

## 2025-08-28 PROCEDURE — 700111 HCHG RX REV CODE 636 W/ 250 OVERRIDE (IP): Mod: JZ | Performed by: STUDENT IN AN ORGANIZED HEALTH CARE EDUCATION/TRAINING PROGRAM

## 2025-08-28 PROCEDURE — 770006 HCHG ROOM/CARE - MED/SURG/GYN SEMI*

## 2025-08-28 PROCEDURE — 700102 HCHG RX REV CODE 250 W/ 637 OVERRIDE(OP): Performed by: STUDENT IN AN ORGANIZED HEALTH CARE EDUCATION/TRAINING PROGRAM

## 2025-08-28 PROCEDURE — 700105 HCHG RX REV CODE 258: Performed by: STUDENT IN AN ORGANIZED HEALTH CARE EDUCATION/TRAINING PROGRAM

## 2025-08-28 PROCEDURE — A9270 NON-COVERED ITEM OR SERVICE: HCPCS | Performed by: STUDENT IN AN ORGANIZED HEALTH CARE EDUCATION/TRAINING PROGRAM

## 2025-08-28 PROCEDURE — 97535 SELF CARE MNGMENT TRAINING: CPT

## 2025-08-28 PROCEDURE — 80053 COMPREHEN METABOLIC PANEL: CPT

## 2025-08-28 PROCEDURE — 36415 COLL VENOUS BLD VENIPUNCTURE: CPT

## 2025-08-28 PROCEDURE — 82962 GLUCOSE BLOOD TEST: CPT | Performed by: STUDENT IN AN ORGANIZED HEALTH CARE EDUCATION/TRAINING PROGRAM

## 2025-08-28 PROCEDURE — 97167 OT EVAL HIGH COMPLEX 60 MIN: CPT

## 2025-08-28 PROCEDURE — 700102 HCHG RX REV CODE 250 W/ 637 OVERRIDE(OP): Performed by: PHYSICIAN ASSISTANT

## 2025-08-28 PROCEDURE — A9270 NON-COVERED ITEM OR SERVICE: HCPCS | Performed by: PHYSICIAN ASSISTANT

## 2025-08-28 RX ORDER — GABAPENTIN 300 MG/1
300 CAPSULE ORAL 3 TIMES DAILY
Status: DISCONTINUED | OUTPATIENT
Start: 2025-08-28 | End: 2025-08-28

## 2025-08-28 RX ORDER — KETOROLAC TROMETHAMINE 15 MG/ML
15 INJECTION, SOLUTION INTRAMUSCULAR; INTRAVENOUS ONCE
Status: COMPLETED | OUTPATIENT
Start: 2025-08-28 | End: 2025-08-28

## 2025-08-28 RX ORDER — PREGABALIN 150 MG/1
150 CAPSULE ORAL 3 TIMES DAILY
Status: DISPENSED | OUTPATIENT
Start: 2025-08-28

## 2025-08-28 RX ORDER — OXYCODONE HYDROCHLORIDE 10 MG/1
10 TABLET ORAL EVERY 4 HOURS PRN
Refills: 0 | Status: DISPENSED | OUTPATIENT
Start: 2025-08-28

## 2025-08-28 RX ORDER — HYDROMORPHONE HYDROCHLORIDE 1 MG/ML
1 INJECTION, SOLUTION INTRAMUSCULAR; INTRAVENOUS; SUBCUTANEOUS EVERY 4 HOURS PRN
Status: DISPENSED | OUTPATIENT
Start: 2025-08-28

## 2025-08-28 RX ADMIN — KETOROLAC TROMETHAMINE 15 MG: 15 INJECTION, SOLUTION INTRAMUSCULAR; INTRAVENOUS at 09:46

## 2025-08-28 RX ADMIN — INSULIN LISPRO 10 UNITS: 100 INJECTION, SOLUTION INTRAVENOUS; SUBCUTANEOUS at 23:47

## 2025-08-28 RX ADMIN — NICOTINE TRANSDERMAL SYSTEM 21 MG: 21 PATCH, EXTENDED RELEASE TRANSDERMAL at 05:54

## 2025-08-28 RX ADMIN — INSULIN LISPRO 4 UNITS: 100 INJECTION, SOLUTION INTRAVENOUS; SUBCUTANEOUS at 12:49

## 2025-08-28 RX ADMIN — AMPICILLIN SODIUM, SULBACTAM SODIUM 3 G: 2; 1 INJECTION, POWDER, FOR SOLUTION INTRAMUSCULAR; INTRAVENOUS at 05:58

## 2025-08-28 RX ADMIN — INSULIN GLARGINE-YFGN 22 UNITS: 100 INJECTION, SOLUTION SUBCUTANEOUS at 17:21

## 2025-08-28 RX ADMIN — APIXABAN 5 MG: 5 TABLET, FILM COATED ORAL at 17:14

## 2025-08-28 RX ADMIN — INSULIN LISPRO 4 UNITS: 100 INJECTION, SOLUTION INTRAVENOUS; SUBCUTANEOUS at 17:21

## 2025-08-28 RX ADMIN — ACETAMINOPHEN 1000 MG: 500 TABLET ORAL at 10:51

## 2025-08-28 RX ADMIN — AMPICILLIN SODIUM, SULBACTAM SODIUM 3 G: 2; 1 INJECTION, POWDER, FOR SOLUTION INTRAMUSCULAR; INTRAVENOUS at 23:42

## 2025-08-28 RX ADMIN — OXYCODONE HYDROCHLORIDE 10 MG: 10 TABLET ORAL at 22:54

## 2025-08-28 RX ADMIN — HYDROMORPHONE HYDROCHLORIDE 1 MG: 1 INJECTION, SOLUTION INTRAMUSCULAR; INTRAVENOUS; SUBCUTANEOUS at 06:01

## 2025-08-28 RX ADMIN — HYDROMORPHONE HYDROCHLORIDE 1 MG: 1 INJECTION, SOLUTION INTRAMUSCULAR; INTRAVENOUS; SUBCUTANEOUS at 11:39

## 2025-08-28 RX ADMIN — PREGABALIN 150 MG: 150 CAPSULE ORAL at 11:33

## 2025-08-28 RX ADMIN — PSYLLIUM HUSK 1 PACKET: 3.4 POWDER ORAL at 05:55

## 2025-08-28 RX ADMIN — AMPICILLIN SODIUM, SULBACTAM SODIUM 3 G: 2; 1 INJECTION, POWDER, FOR SOLUTION INTRAMUSCULAR; INTRAVENOUS at 17:20

## 2025-08-28 RX ADMIN — PREGABALIN 150 MG: 150 CAPSULE ORAL at 17:14

## 2025-08-28 RX ADMIN — AMPICILLIN SODIUM, SULBACTAM SODIUM 3 G: 2; 1 INJECTION, POWDER, FOR SOLUTION INTRAMUSCULAR; INTRAVENOUS at 11:38

## 2025-08-28 ASSESSMENT — PAIN DESCRIPTION - PAIN TYPE
TYPE: ACUTE PAIN

## 2025-08-28 ASSESSMENT — COGNITIVE AND FUNCTIONAL STATUS - GENERAL
PERSONAL GROOMING: A LITTLE
DAILY ACTIVITIY SCORE: 19
HELP NEEDED FOR BATHING: A LITTLE
DRESSING REGULAR UPPER BODY CLOTHING: A LITTLE
SUGGESTED CMS G CODE MODIFIER DAILY ACTIVITY: CK
DRESSING REGULAR LOWER BODY CLOTHING: A LITTLE
TOILETING: A LITTLE

## 2025-08-28 ASSESSMENT — ACTIVITIES OF DAILY LIVING (ADL): TOILETING: INDEPENDENT

## 2025-08-29 LAB
ANION GAP SERPL CALC-SCNC: 10 MMOL/L (ref 7–16)
BASOPHILS # BLD AUTO: 0.3 % (ref 0–1.8)
BASOPHILS # BLD: 0.02 K/UL (ref 0–0.12)
BUN SERPL-MCNC: 50 MG/DL (ref 8–22)
CALCIUM SERPL-MCNC: 7.8 MG/DL (ref 8.5–10.5)
CHLORIDE SERPL-SCNC: 101 MMOL/L (ref 96–112)
CO2 SERPL-SCNC: 24 MMOL/L (ref 20–33)
CREAT SERPL-MCNC: 1.35 MG/DL (ref 0.5–1.4)
EOSINOPHIL # BLD AUTO: 0.14 K/UL (ref 0–0.51)
EOSINOPHIL NFR BLD: 2.1 % (ref 0–6.9)
ERYTHROCYTE [DISTWIDTH] IN BLOOD BY AUTOMATED COUNT: 41.4 FL (ref 35.9–50)
FERRITIN SERPL-MCNC: 207 NG/ML (ref 22–322)
GFR SERPLBLD CREATININE-BSD FMLA CKD-EPI: 62 ML/MIN/1.73 M 2
GLUCOSE BLD STRIP.AUTO-MCNC: 123 MG/DL (ref 65–99)
GLUCOSE BLD STRIP.AUTO-MCNC: 166 MG/DL (ref 65–99)
GLUCOSE BLD STRIP.AUTO-MCNC: 177 MG/DL (ref 65–99)
GLUCOSE BLD STRIP.AUTO-MCNC: 201 MG/DL (ref 65–99)
GLUCOSE BLD STRIP.AUTO-MCNC: 228 MG/DL (ref 65–99)
GLUCOSE SERPL-MCNC: 193 MG/DL (ref 65–99)
HCT VFR BLD AUTO: 23.9 % (ref 42–52)
HGB BLD-MCNC: 7.7 G/DL (ref 14–18)
IMM GRANULOCYTES # BLD AUTO: 0.02 K/UL (ref 0–0.11)
IMM GRANULOCYTES NFR BLD AUTO: 0.3 % (ref 0–0.9)
IRON SATN MFR SERPL: 8 % (ref 15–55)
IRON SERPL-MCNC: 19 UG/DL (ref 50–180)
LYMPHOCYTES # BLD AUTO: 0.9 K/UL (ref 1–4.8)
LYMPHOCYTES NFR BLD: 13.5 % (ref 22–41)
MAGNESIUM SERPL-MCNC: 1.8 MG/DL (ref 1.5–2.5)
MCH RBC QN AUTO: 27.5 PG (ref 27–33)
MCHC RBC AUTO-ENTMCNC: 32.2 G/DL (ref 32.3–36.5)
MCV RBC AUTO: 85.4 FL (ref 81.4–97.8)
MONOCYTES # BLD AUTO: 0.8 K/UL (ref 0–0.85)
MONOCYTES NFR BLD AUTO: 12 % (ref 0–13.4)
NEUTROPHILS # BLD AUTO: 4.8 K/UL (ref 1.82–7.42)
NEUTROPHILS NFR BLD: 71.8 % (ref 44–72)
NRBC # BLD AUTO: 0 K/UL
NRBC BLD-RTO: 0 /100 WBC (ref 0–0.2)
PLATELET # BLD AUTO: 191 K/UL (ref 164–446)
PMV BLD AUTO: 10.5 FL (ref 9–12.9)
POTASSIUM SERPL-SCNC: 4.2 MMOL/L (ref 3.6–5.5)
RBC # BLD AUTO: 2.8 M/UL (ref 4.7–6.1)
SODIUM SERPL-SCNC: 135 MMOL/L (ref 135–145)
TIBC SERPL-MCNC: 233 UG/DL (ref 250–450)
UIBC SERPL-MCNC: 214 UG/DL (ref 110–370)
WBC # BLD AUTO: 6.7 K/UL (ref 4.8–10.8)

## 2025-08-29 PROCEDURE — 770006 HCHG ROOM/CARE - MED/SURG/GYN SEMI*

## 2025-08-29 PROCEDURE — A9270 NON-COVERED ITEM OR SERVICE: HCPCS | Performed by: STUDENT IN AN ORGANIZED HEALTH CARE EDUCATION/TRAINING PROGRAM

## 2025-08-29 PROCEDURE — 82962 GLUCOSE BLOOD TEST: CPT | Performed by: STUDENT IN AN ORGANIZED HEALTH CARE EDUCATION/TRAINING PROGRAM

## 2025-08-29 PROCEDURE — 83540 ASSAY OF IRON: CPT

## 2025-08-29 PROCEDURE — 80048 BASIC METABOLIC PNL TOTAL CA: CPT

## 2025-08-29 PROCEDURE — 83735 ASSAY OF MAGNESIUM: CPT

## 2025-08-29 PROCEDURE — 700102 HCHG RX REV CODE 250 W/ 637 OVERRIDE(OP): Performed by: STUDENT IN AN ORGANIZED HEALTH CARE EDUCATION/TRAINING PROGRAM

## 2025-08-29 PROCEDURE — 82728 ASSAY OF FERRITIN: CPT

## 2025-08-29 PROCEDURE — 700102 HCHG RX REV CODE 250 W/ 637 OVERRIDE(OP): Performed by: PHYSICIAN ASSISTANT

## 2025-08-29 PROCEDURE — 85025 COMPLETE CBC W/AUTO DIFF WBC: CPT

## 2025-08-29 PROCEDURE — 700105 HCHG RX REV CODE 258: Performed by: STUDENT IN AN ORGANIZED HEALTH CARE EDUCATION/TRAINING PROGRAM

## 2025-08-29 PROCEDURE — 700111 HCHG RX REV CODE 636 W/ 250 OVERRIDE (IP): Mod: JZ | Performed by: STUDENT IN AN ORGANIZED HEALTH CARE EDUCATION/TRAINING PROGRAM

## 2025-08-29 PROCEDURE — 36415 COLL VENOUS BLD VENIPUNCTURE: CPT

## 2025-08-29 PROCEDURE — 83550 IRON BINDING TEST: CPT

## 2025-08-29 PROCEDURE — A9270 NON-COVERED ITEM OR SERVICE: HCPCS | Performed by: PHYSICIAN ASSISTANT

## 2025-08-29 RX ORDER — CARVEDILOL 3.12 MG/1
3.12 TABLET ORAL 2 TIMES DAILY WITH MEALS
Status: DISPENSED | OUTPATIENT
Start: 2025-08-29

## 2025-08-29 RX ADMIN — OXYCODONE HYDROCHLORIDE 10 MG: 10 TABLET ORAL at 08:45

## 2025-08-29 RX ADMIN — INSULIN LISPRO 3 UNITS: 100 INJECTION, SOLUTION INTRAVENOUS; SUBCUTANEOUS at 13:08

## 2025-08-29 RX ADMIN — INSULIN GLARGINE-YFGN 22 UNITS: 100 INJECTION, SOLUTION SUBCUTANEOUS at 17:30

## 2025-08-29 RX ADMIN — CARVEDILOL 3.12 MG: 3.12 TABLET, FILM COATED ORAL at 18:31

## 2025-08-29 RX ADMIN — OXYCODONE HYDROCHLORIDE 10 MG: 10 TABLET ORAL at 17:28

## 2025-08-29 RX ADMIN — INSULIN LISPRO 3 UNITS: 100 INJECTION, SOLUTION INTRAVENOUS; SUBCUTANEOUS at 08:46

## 2025-08-29 RX ADMIN — AMPICILLIN SODIUM, SULBACTAM SODIUM 3 G: 2; 1 INJECTION, POWDER, FOR SOLUTION INTRAMUSCULAR; INTRAVENOUS at 13:11

## 2025-08-29 RX ADMIN — PREGABALIN 150 MG: 150 CAPSULE ORAL at 05:51

## 2025-08-29 RX ADMIN — INSULIN LISPRO 4 UNITS: 100 INJECTION, SOLUTION INTRAVENOUS; SUBCUTANEOUS at 17:30

## 2025-08-29 RX ADMIN — NICOTINE TRANSDERMAL SYSTEM 21 MG: 21 PATCH, EXTENDED RELEASE TRANSDERMAL at 05:51

## 2025-08-29 RX ADMIN — APIXABAN 5 MG: 5 TABLET, FILM COATED ORAL at 17:28

## 2025-08-29 RX ADMIN — APIXABAN 5 MG: 5 TABLET, FILM COATED ORAL at 05:51

## 2025-08-29 RX ADMIN — PREGABALIN 150 MG: 150 CAPSULE ORAL at 13:07

## 2025-08-29 RX ADMIN — AMPICILLIN SODIUM, SULBACTAM SODIUM 3 G: 2; 1 INJECTION, POWDER, FOR SOLUTION INTRAMUSCULAR; INTRAVENOUS at 05:51

## 2025-08-29 RX ADMIN — PREGABALIN 150 MG: 150 CAPSULE ORAL at 18:31

## 2025-08-29 ASSESSMENT — PATIENT HEALTH QUESTIONNAIRE - PHQ9
SUM OF ALL RESPONSES TO PHQ9 QUESTIONS 1 AND 2: 0
1. LITTLE INTEREST OR PLEASURE IN DOING THINGS: NOT AT ALL
2. FEELING DOWN, DEPRESSED, IRRITABLE, OR HOPELESS: NOT AT ALL

## 2025-08-29 ASSESSMENT — PAIN DESCRIPTION - PAIN TYPE
TYPE: ACUTE PAIN

## 2025-08-30 LAB
ANION GAP SERPL CALC-SCNC: 11 MMOL/L (ref 7–16)
BUN SERPL-MCNC: 47 MG/DL (ref 8–22)
CALCIUM SERPL-MCNC: 8.3 MG/DL (ref 8.5–10.5)
CHLORIDE SERPL-SCNC: 101 MMOL/L (ref 96–112)
CO2 SERPL-SCNC: 22 MMOL/L (ref 20–33)
CREAT SERPL-MCNC: 1.06 MG/DL (ref 0.5–1.4)
ERYTHROCYTE [DISTWIDTH] IN BLOOD BY AUTOMATED COUNT: 42.5 FL (ref 35.9–50)
GFR SERPLBLD CREATININE-BSD FMLA CKD-EPI: 83 ML/MIN/1.73 M 2
GLUCOSE BLD STRIP.AUTO-MCNC: 125 MG/DL (ref 65–99)
GLUCOSE BLD STRIP.AUTO-MCNC: 129 MG/DL (ref 65–99)
GLUCOSE BLD STRIP.AUTO-MCNC: 186 MG/DL (ref 65–99)
GLUCOSE BLD STRIP.AUTO-MCNC: 194 MG/DL (ref 65–99)
GLUCOSE BLD STRIP.AUTO-MCNC: 206 MG/DL (ref 65–99)
GLUCOSE BLD STRIP.AUTO-MCNC: 228 MG/DL (ref 65–99)
GLUCOSE BLD STRIP.AUTO-MCNC: 325 MG/DL (ref 65–99)
GLUCOSE BLD STRIP.AUTO-MCNC: 49 MG/DL (ref 65–99)
GLUCOSE SERPL-MCNC: 116 MG/DL (ref 65–99)
HCT VFR BLD AUTO: 26 % (ref 42–52)
HGB BLD-MCNC: 8.1 G/DL (ref 14–18)
MAGNESIUM SERPL-MCNC: 1.9 MG/DL (ref 1.5–2.5)
MCH RBC QN AUTO: 27.5 PG (ref 27–33)
MCHC RBC AUTO-ENTMCNC: 31.2 G/DL (ref 32.3–36.5)
MCV RBC AUTO: 88.1 FL (ref 81.4–97.8)
PHOSPHATE SERPL-MCNC: 3.5 MG/DL (ref 2.5–4.5)
PLATELET # BLD AUTO: 222 K/UL (ref 164–446)
PMV BLD AUTO: 10.7 FL (ref 9–12.9)
POTASSIUM SERPL-SCNC: 4 MMOL/L (ref 3.6–5.5)
RBC # BLD AUTO: 2.95 M/UL (ref 4.7–6.1)
SODIUM SERPL-SCNC: 134 MMOL/L (ref 135–145)
WBC # BLD AUTO: 6 K/UL (ref 4.8–10.8)

## 2025-08-30 PROCEDURE — 700102 HCHG RX REV CODE 250 W/ 637 OVERRIDE(OP): Performed by: STUDENT IN AN ORGANIZED HEALTH CARE EDUCATION/TRAINING PROGRAM

## 2025-08-30 PROCEDURE — 770006 HCHG ROOM/CARE - MED/SURG/GYN SEMI*

## 2025-08-30 PROCEDURE — 700111 HCHG RX REV CODE 636 W/ 250 OVERRIDE (IP): Mod: JZ | Performed by: STUDENT IN AN ORGANIZED HEALTH CARE EDUCATION/TRAINING PROGRAM

## 2025-08-30 PROCEDURE — 700101 HCHG RX REV CODE 250

## 2025-08-30 PROCEDURE — A9270 NON-COVERED ITEM OR SERVICE: HCPCS | Performed by: PHYSICIAN ASSISTANT

## 2025-08-30 PROCEDURE — 82962 GLUCOSE BLOOD TEST: CPT | Performed by: STUDENT IN AN ORGANIZED HEALTH CARE EDUCATION/TRAINING PROGRAM

## 2025-08-30 PROCEDURE — A9270 NON-COVERED ITEM OR SERVICE: HCPCS | Performed by: STUDENT IN AN ORGANIZED HEALTH CARE EDUCATION/TRAINING PROGRAM

## 2025-08-30 PROCEDURE — 700102 HCHG RX REV CODE 250 W/ 637 OVERRIDE(OP): Performed by: PHYSICIAN ASSISTANT

## 2025-08-30 PROCEDURE — 85027 COMPLETE CBC AUTOMATED: CPT

## 2025-08-30 PROCEDURE — 97530 THERAPEUTIC ACTIVITIES: CPT

## 2025-08-30 PROCEDURE — 83735 ASSAY OF MAGNESIUM: CPT

## 2025-08-30 PROCEDURE — 36415 COLL VENOUS BLD VENIPUNCTURE: CPT

## 2025-08-30 PROCEDURE — 84100 ASSAY OF PHOSPHORUS: CPT

## 2025-08-30 PROCEDURE — 80048 BASIC METABOLIC PNL TOTAL CA: CPT

## 2025-08-30 RX ORDER — FERROUS SULFATE 325(65) MG
325 TABLET ORAL
Status: DISPENSED | OUTPATIENT
Start: 2025-08-31

## 2025-08-30 RX ORDER — FUROSEMIDE 10 MG/ML
40 INJECTION INTRAMUSCULAR; INTRAVENOUS ONCE
Status: COMPLETED | OUTPATIENT
Start: 2025-08-30 | End: 2025-08-30

## 2025-08-30 RX ADMIN — INSULIN LISPRO 3 UNITS: 100 INJECTION, SOLUTION INTRAVENOUS; SUBCUTANEOUS at 08:26

## 2025-08-30 RX ADMIN — OXYCODONE HYDROCHLORIDE 10 MG: 10 TABLET ORAL at 17:56

## 2025-08-30 RX ADMIN — HYDROMORPHONE HYDROCHLORIDE 1 MG: 1 INJECTION, SOLUTION INTRAMUSCULAR; INTRAVENOUS; SUBCUTANEOUS at 20:50

## 2025-08-30 RX ADMIN — CARVEDILOL 3.12 MG: 3.12 TABLET, FILM COATED ORAL at 17:55

## 2025-08-30 RX ADMIN — PREGABALIN 150 MG: 150 CAPSULE ORAL at 17:55

## 2025-08-30 RX ADMIN — APIXABAN 5 MG: 5 TABLET, FILM COATED ORAL at 17:55

## 2025-08-30 RX ADMIN — APIXABAN 5 MG: 5 TABLET, FILM COATED ORAL at 05:26

## 2025-08-30 RX ADMIN — PREGABALIN 150 MG: 150 CAPSULE ORAL at 12:45

## 2025-08-30 RX ADMIN — DEXTROSE MONOHYDRATE 25 G: 25 INJECTION, SOLUTION INTRAVENOUS at 03:10

## 2025-08-30 RX ADMIN — INSULIN LISPRO 4 UNITS: 100 INJECTION, SOLUTION INTRAVENOUS; SUBCUTANEOUS at 12:50

## 2025-08-30 RX ADMIN — HYDROMORPHONE HYDROCHLORIDE 1 MG: 1 INJECTION, SOLUTION INTRAMUSCULAR; INTRAVENOUS; SUBCUTANEOUS at 03:04

## 2025-08-30 RX ADMIN — CARVEDILOL 3.12 MG: 3.12 TABLET, FILM COATED ORAL at 08:23

## 2025-08-30 RX ADMIN — FUROSEMIDE 40 MG: 10 INJECTION, SOLUTION INTRAVENOUS at 12:45

## 2025-08-30 RX ADMIN — OXYCODONE HYDROCHLORIDE 10 MG: 10 TABLET ORAL at 06:05

## 2025-08-30 RX ADMIN — INSULIN GLARGINE-YFGN 10 UNITS: 100 INJECTION, SOLUTION SUBCUTANEOUS at 18:00

## 2025-08-30 RX ADMIN — INSULIN LISPRO 4 UNITS: 100 INJECTION, SOLUTION INTRAVENOUS; SUBCUTANEOUS at 23:00

## 2025-08-30 RX ADMIN — HYDROMORPHONE HYDROCHLORIDE 1 MG: 1 INJECTION, SOLUTION INTRAMUSCULAR; INTRAVENOUS; SUBCUTANEOUS at 08:22

## 2025-08-30 RX ADMIN — PREGABALIN 150 MG: 150 CAPSULE ORAL at 05:26

## 2025-08-30 RX ADMIN — NICOTINE TRANSDERMAL SYSTEM 21 MG: 21 PATCH, EXTENDED RELEASE TRANSDERMAL at 05:25

## 2025-08-30 RX ADMIN — INSULIN LISPRO 10 UNITS: 100 INJECTION, SOLUTION INTRAVENOUS; SUBCUTANEOUS at 18:01

## 2025-08-30 ASSESSMENT — PAIN DESCRIPTION - PAIN TYPE
TYPE: ACUTE PAIN
TYPE: SURGICAL PAIN
TYPE: ACUTE PAIN
TYPE: SURGICAL PAIN
TYPE: ACUTE PAIN
TYPE: ACUTE PAIN
TYPE: ACUTE PAIN;PHANTOM PAIN
TYPE: ACUTE PAIN
TYPE: SURGICAL PAIN

## 2025-08-30 ASSESSMENT — COGNITIVE AND FUNCTIONAL STATUS - GENERAL
STANDING UP FROM CHAIR USING ARMS: TOTAL
SUGGESTED CMS G CODE MODIFIER MOBILITY: CL
MOVING TO AND FROM BED TO CHAIR: A LITTLE
WALKING IN HOSPITAL ROOM: TOTAL
CLIMB 3 TO 5 STEPS WITH RAILING: TOTAL
MOBILITY SCORE: 14

## 2025-08-30 ASSESSMENT — PATIENT HEALTH QUESTIONNAIRE - PHQ9
1. LITTLE INTEREST OR PLEASURE IN DOING THINGS: NOT AT ALL
2. FEELING DOWN, DEPRESSED, IRRITABLE, OR HOPELESS: NOT AT ALL
SUM OF ALL RESPONSES TO PHQ9 QUESTIONS 1 AND 2: 0
1. LITTLE INTEREST OR PLEASURE IN DOING THINGS: NOT AT ALL
SUM OF ALL RESPONSES TO PHQ9 QUESTIONS 1 AND 2: 0
2. FEELING DOWN, DEPRESSED, IRRITABLE, OR HOPELESS: NOT AT ALL

## 2025-08-31 VITALS
HEIGHT: 73 IN | RESPIRATION RATE: 17 BRPM | SYSTOLIC BLOOD PRESSURE: 130 MMHG | TEMPERATURE: 98.9 F | OXYGEN SATURATION: 93 % | HEART RATE: 96 BPM | WEIGHT: 213.85 LBS | DIASTOLIC BLOOD PRESSURE: 61 MMHG | BODY MASS INDEX: 28.34 KG/M2

## 2025-08-31 LAB
GLUCOSE BLD STRIP.AUTO-MCNC: 141 MG/DL (ref 65–99)
GLUCOSE BLD STRIP.AUTO-MCNC: 257 MG/DL (ref 65–99)
GLUCOSE BLD STRIP.AUTO-MCNC: 270 MG/DL (ref 65–99)
GLUCOSE BLD STRIP.AUTO-MCNC: 346 MG/DL (ref 65–99)

## 2025-08-31 PROCEDURE — 700102 HCHG RX REV CODE 250 W/ 637 OVERRIDE(OP): Performed by: STUDENT IN AN ORGANIZED HEALTH CARE EDUCATION/TRAINING PROGRAM

## 2025-08-31 PROCEDURE — 700102 HCHG RX REV CODE 250 W/ 637 OVERRIDE(OP): Performed by: PHYSICIAN ASSISTANT

## 2025-08-31 PROCEDURE — 700111 HCHG RX REV CODE 636 W/ 250 OVERRIDE (IP): Mod: JZ | Performed by: STUDENT IN AN ORGANIZED HEALTH CARE EDUCATION/TRAINING PROGRAM

## 2025-08-31 PROCEDURE — A9270 NON-COVERED ITEM OR SERVICE: HCPCS | Performed by: PHYSICIAN ASSISTANT

## 2025-08-31 PROCEDURE — 770006 HCHG ROOM/CARE - MED/SURG/GYN SEMI*

## 2025-08-31 PROCEDURE — 82962 GLUCOSE BLOOD TEST: CPT | Performed by: STUDENT IN AN ORGANIZED HEALTH CARE EDUCATION/TRAINING PROGRAM

## 2025-08-31 PROCEDURE — A9270 NON-COVERED ITEM OR SERVICE: HCPCS | Performed by: STUDENT IN AN ORGANIZED HEALTH CARE EDUCATION/TRAINING PROGRAM

## 2025-08-31 RX ORDER — FUROSEMIDE 10 MG/ML
40 INJECTION INTRAMUSCULAR; INTRAVENOUS 2 TIMES DAILY
Status: DISPENSED | OUTPATIENT
Start: 2025-08-31

## 2025-08-31 RX ADMIN — INSULIN LISPRO 10 UNITS: 100 INJECTION, SOLUTION INTRAVENOUS; SUBCUTANEOUS at 18:18

## 2025-08-31 RX ADMIN — CARVEDILOL 3.12 MG: 3.12 TABLET, FILM COATED ORAL at 07:37

## 2025-08-31 RX ADMIN — PREGABALIN 150 MG: 150 CAPSULE ORAL at 12:05

## 2025-08-31 RX ADMIN — FERROUS SULFATE TAB 325 MG (65 MG ELEMENTAL FE) 325 MG: 325 (65 FE) TAB at 07:37

## 2025-08-31 RX ADMIN — CARVEDILOL 3.12 MG: 3.12 TABLET, FILM COATED ORAL at 18:13

## 2025-08-31 RX ADMIN — FUROSEMIDE 40 MG: 10 INJECTION, SOLUTION INTRAVENOUS at 18:29

## 2025-08-31 RX ADMIN — NICOTINE TRANSDERMAL SYSTEM 21 MG: 21 PATCH, EXTENDED RELEASE TRANSDERMAL at 06:04

## 2025-08-31 RX ADMIN — HYDROMORPHONE HYDROCHLORIDE 1 MG: 1 INJECTION, SOLUTION INTRAMUSCULAR; INTRAVENOUS; SUBCUTANEOUS at 04:01

## 2025-08-31 RX ADMIN — APIXABAN 5 MG: 5 TABLET, FILM COATED ORAL at 06:04

## 2025-08-31 RX ADMIN — PREGABALIN 150 MG: 150 CAPSULE ORAL at 06:04

## 2025-08-31 RX ADMIN — OXYCODONE HYDROCHLORIDE 10 MG: 10 TABLET ORAL at 23:01

## 2025-08-31 RX ADMIN — INSULIN LISPRO 7 UNITS: 100 INJECTION, SOLUTION INTRAVENOUS; SUBCUTANEOUS at 22:53

## 2025-08-31 RX ADMIN — INSULIN LISPRO 7 UNITS: 100 INJECTION, SOLUTION INTRAVENOUS; SUBCUTANEOUS at 12:12

## 2025-08-31 RX ADMIN — HYDROMORPHONE HYDROCHLORIDE 1 MG: 1 INJECTION, SOLUTION INTRAMUSCULAR; INTRAVENOUS; SUBCUTANEOUS at 18:40

## 2025-08-31 RX ADMIN — OXYCODONE HYDROCHLORIDE 10 MG: 10 TABLET ORAL at 12:09

## 2025-08-31 RX ADMIN — INSULIN GLARGINE-YFGN 10 UNITS: 100 INJECTION, SOLUTION SUBCUTANEOUS at 18:19

## 2025-08-31 RX ADMIN — PREGABALIN 150 MG: 150 CAPSULE ORAL at 18:13

## 2025-08-31 RX ADMIN — FUROSEMIDE 40 MG: 10 INJECTION, SOLUTION INTRAVENOUS at 12:05

## 2025-08-31 RX ADMIN — APIXABAN 5 MG: 5 TABLET, FILM COATED ORAL at 18:13

## 2025-08-31 ASSESSMENT — PAIN DESCRIPTION - PAIN TYPE
TYPE: ACUTE PAIN

## 2025-08-31 ASSESSMENT — PATIENT HEALTH QUESTIONNAIRE - PHQ9
1. LITTLE INTEREST OR PLEASURE IN DOING THINGS: NOT AT ALL
SUM OF ALL RESPONSES TO PHQ9 QUESTIONS 1 AND 2: 0
2. FEELING DOWN, DEPRESSED, IRRITABLE, OR HOPELESS: NOT AT ALL

## (undated) DEVICE — LACTATED RINGERS INJ 1000 ML - (14EA/CA 60CA/PF)

## (undated) DEVICE — TUBING CLEARLINK DUO-VENT - C-FLO (48EA/CA)

## (undated) DEVICE — DRESSING TRANSPARENT FILM TEGADERM 4 X 4.75" (50EA/BX)"

## (undated) DEVICE — GOWN SURGEONS X-LARGE - DISP. (30/CA)

## (undated) DEVICE — PACK MAJOR ORTHO - (2EA/CA)

## (undated) DEVICE — SENSOR SPO2 NEO LNCS ADHESIVE (20/BX) SEE USER NOTES

## (undated) DEVICE — SUTURE 0 VICRYL PLUS CT-1 - 8 X 18 INCH (12/BX)

## (undated) DEVICE — SET LEADWIRE 5 LEAD BEDSIDE DISPOSABLE ECG (1SET OF 5/EA)

## (undated) DEVICE — SODIUM CHL IRRIGATION 0.9% 1000ML (12EA/CA)

## (undated) DEVICE — CHLORAPREP 26 ML APPLICATOR - ORANGE TINT(25/CA)

## (undated) DEVICE — NEPTUNE 4 PORT MANIFOLD - (20/PK)

## (undated) DEVICE — SPONGE GAUZESTER 4 X 4 4PLY - (128PK/CA)

## (undated) DEVICE — KIT ANESTHESIA W/CIRCUIT & 3/LT BAG W/FILTER (20EA/CA)

## (undated) DEVICE — CANISTER SUCTION 3000ML MECHANICAL FILTER AUTO SHUTOFF MEDI-VAC NONSTERILE LF DISP (40EA/CA)

## (undated) DEVICE — SUTURE 0 SILK TIES (36PK/BX)

## (undated) DEVICE — HEAD HOLDER JUNIOR/ADULT

## (undated) DEVICE — MASK ANESTHESIA ADULT  - (100/CA)

## (undated) DEVICE — COVER LIGHT HANDLE ALC PLUS DISP (18EA/BX)

## (undated) DEVICE — GLOVE BIOGEL PI INDICATOR SZ 7.0 SURGICAL PF LF - (50/BX 4BX/CA)

## (undated) DEVICE — MASK, LARYNGEAL AIRWAY #5

## (undated) DEVICE — BLADE SURGICAL #10 - (50/BX)

## (undated) DEVICE — BLADE SAW 90X25X1.37MM SAGITTAL DUAL CUT

## (undated) DEVICE — SENSOR OXIMETER ADULT SPO2 RD SET (20EA/BX)

## (undated) DEVICE — SUTURE 2-0 VICRYL PLUS CT-1 - 8 X 18 INCH(12/BX)

## (undated) DEVICE — TOWELS CLOTH SURGICAL - (4/PK 20PK/CA)

## (undated) DEVICE — SUCTION INSTRUMENT YANKAUER BULBOUS TIP W/O VENT (50EA/CA)

## (undated) DEVICE — PACK MAJOR ORTHO TRAUMA- (3EA/CA)

## (undated) DEVICE — DETERGENT RENUZYME PLUS 10 OZ PACKET (50/BX)

## (undated) DEVICE — GLOVE BIOGEL SZ 7.5 SURGICAL PF LTX - (50PR/BX 4BX/CA)

## (undated) DEVICE — GLOVE BIOGEL SZ 7 SURGICAL PF LTX - (50PR/BX 4BX/CA)

## (undated) DEVICE — GUIDE PIN CALIBRATED (5EA/PK) (4TX6=24)

## (undated) DEVICE — SET EXTENSION WITH 2 PORTS (48EA/CA) ***PART #2C8610 IS A SUBSTITUTE*****

## (undated) DEVICE — ELECTRODE DUAL RETURN W/ CORD - (50/PK)

## (undated) DEVICE — GLOVE SZ 7 BIOGEL PI MICRO - PF LF (50PR/BX 4BX/CA)

## (undated) DEVICE — STAPLER 35MM SKIN WIDE ROTATING HEAD (6EA/BX)

## (undated) DEVICE — SUTURE GENERAL

## (undated) DEVICE — PROTECTOR ULNA NERVE - (36PR/CA)

## (undated) DEVICE — DRAPE U ORTHOPEDIC - (10/BX)

## (undated) DEVICE — Device

## (undated) DEVICE — STOCKINETTE IMPERVIOUS 12X48 - STERILELF (10/CA)"

## (undated) DEVICE — SLEEVE VASO DVT COMPRESSION CALF MED - (10PR/CA)

## (undated) DEVICE — GLOVE BIOGEL INDICATOR SZ 7.5 SURGICAL PF LTX - (50PR/BX 4BX/CA)

## (undated) DEVICE — ROD GUIDE BALL TIP 3.0MM X 1000MM

## (undated) DEVICE — DRAPE C ARMOR (12EA/CA)

## (undated) DEVICE — GOWN WARMING STANDARD FLEX - (30/CA)

## (undated) DEVICE — TOURNIQUET CUFF 24 X 4 ONE PORT - STERILE (10/BX)

## (undated) DEVICE — DRAPE LOWER EXTREMETY - (6/CA)

## (undated) DEVICE — GLOVE BIOGEL PI INDICATOR SZ 8.0 SURGICAL PF LF -(50/BX 4BX/CA)

## (undated) DEVICE — SUTURE ETHILON 2-0 FSLX 30 (36PK/BX)"

## (undated) DEVICE — CANISTER SUCTION 3000ML MECHANICAL FILTER AUTO SHUTOFF MEDI-VAC NONSTERILE LF DISP  (40EA/CA)

## (undated) DEVICE — BLANKET WARMING UPPER BODY - (10/CA)

## (undated) DEVICE — GLOVE BIOGEL ECLIPSE PF LATEX SIZE 7.5

## (undated) DEVICE — ELECTRODE 850 FOAM ADHESIVE - HYDROGEL RADIOTRNSPRNT (50/PK)

## (undated) DEVICE — KIT ROOM DECONTAMINATION

## (undated) DEVICE — PAD LAP STERILE 18 X 18 - (5/PK 40PK/CA)

## (undated) DEVICE — DRAPE SURGICAL U 77X120 - (10/CA)